# Patient Record
Sex: FEMALE | Race: WHITE | NOT HISPANIC OR LATINO | Employment: OTHER | URBAN - NONMETROPOLITAN AREA
[De-identification: names, ages, dates, MRNs, and addresses within clinical notes are randomized per-mention and may not be internally consistent; named-entity substitution may affect disease eponyms.]

---

## 2019-05-31 ENCOUNTER — OFFICE VISIT (OUTPATIENT)
Dept: AUDIOLOGY | Facility: OTHER | Age: 84
End: 2019-05-31
Attending: AUDIOLOGIST
Payer: MEDICARE

## 2019-05-31 DIAGNOSIS — H90.3 SENSORINEURAL HEARING LOSS (SNHL) OF BOTH EARS: Primary | ICD-10-CM

## 2019-05-31 PROCEDURE — V5299 HEARING SERVICE: HCPCS | Performed by: AUDIOLOGIST

## 2019-05-31 NOTE — PROGRESS NOTES
Background:  Received repaired Right [employer] Quantum 2 10 TIC .  The hearing aid was repaired under warranty as seam came apart.      Procedures Performed:  The  completed the following repairs: remake and replaced electronics. Reprogrammed to user settings.    Follow up:   Ms. Greene was called for hearing aid  at .  Return to clinic as needed.  Hearing evaluation recommended as last results 8/21/15 and hearing aid reprogramming.    Erika Sexton  Audiology Assistant  [facility]  [phone]    Dena Newman M.S., [facility]  Audiologist #[id number]

## 2019-10-30 ENCOUNTER — APPOINTMENT (OUTPATIENT)
Dept: CT IMAGING | Facility: HOSPITAL | Age: 84
DRG: 872 | End: 2019-10-30
Attending: FAMILY MEDICINE
Payer: MEDICARE

## 2019-10-30 ENCOUNTER — HOSPITAL ENCOUNTER (INPATIENT)
Facility: HOSPITAL | Age: 84
LOS: 2 days | Discharge: HOME HEALTH | DRG: 872 | End: 2019-11-01
Attending: FAMILY MEDICINE | Admitting: INTERNAL MEDICINE
Payer: MEDICARE

## 2019-10-30 ENCOUNTER — HOSPITAL ENCOUNTER (INPATIENT)
Dept: CARDIOLOGY | Facility: HOSPITAL | Age: 84
DRG: 872 | End: 2019-10-30
Attending: INTERNAL MEDICINE
Payer: MEDICARE

## 2019-10-30 ENCOUNTER — APPOINTMENT (OUTPATIENT)
Dept: GENERAL RADIOLOGY | Facility: HOSPITAL | Age: 84
DRG: 872 | End: 2019-10-30
Attending: INTERNAL MEDICINE
Payer: MEDICARE

## 2019-10-30 ENCOUNTER — APPOINTMENT (OUTPATIENT)
Dept: GENERAL RADIOLOGY | Facility: HOSPITAL | Age: 84
DRG: 872 | End: 2019-10-30
Attending: FAMILY MEDICINE
Payer: MEDICARE

## 2019-10-30 DIAGNOSIS — I48.91 NEW ONSET A-FIB (H): ICD-10-CM

## 2019-10-30 DIAGNOSIS — N30.00 ACUTE CYSTITIS WITHOUT HEMATURIA: Primary | ICD-10-CM

## 2019-10-30 DIAGNOSIS — N39.0 ACUTE UTI: ICD-10-CM

## 2019-10-30 LAB
ALBUMIN SERPL-MCNC: 3.7 G/DL (ref 3.4–5)
ALBUMIN UR-MCNC: 30 MG/DL
ALP SERPL-CCNC: 94 U/L (ref 40–150)
ALT SERPL W P-5'-P-CCNC: 35 U/L (ref 0–50)
ANION GAP SERPL CALCULATED.3IONS-SCNC: 7 MMOL/L (ref 3–14)
APPEARANCE UR: ABNORMAL
AST SERPL W P-5'-P-CCNC: 60 U/L (ref 0–45)
BACTERIA #/AREA URNS HPF: ABNORMAL /HPF
BASOPHILS # BLD AUTO: 0 10E9/L (ref 0–0.2)
BASOPHILS NFR BLD AUTO: 0.5 %
BILIRUB SERPL-MCNC: 0.6 MG/DL (ref 0.2–1.3)
BILIRUB UR QL STRIP: NEGATIVE
BUN SERPL-MCNC: 19 MG/DL (ref 7–30)
CALCIUM SERPL-MCNC: 9.5 MG/DL (ref 8.5–10.1)
CHLORIDE SERPL-SCNC: 102 MMOL/L (ref 94–109)
CK SERPL-CCNC: 874 U/L (ref 30–225)
CO2 SERPL-SCNC: 24 MMOL/L (ref 20–32)
COLOR UR AUTO: YELLOW
CREAT SERPL-MCNC: 1.14 MG/DL (ref 0.52–1.04)
DIFFERENTIAL METHOD BLD: ABNORMAL
EOSINOPHIL # BLD AUTO: 0 10E9/L (ref 0–0.7)
EOSINOPHIL NFR BLD AUTO: 0 %
ERYTHROCYTE [DISTWIDTH] IN BLOOD BY AUTOMATED COUNT: 13.6 % (ref 10–15)
GFR SERPL CREATININE-BSD FRML MDRD: 43 ML/MIN/{1.73_M2}
GLUCOSE SERPL-MCNC: 94 MG/DL (ref 70–99)
GLUCOSE UR STRIP-MCNC: NEGATIVE MG/DL
HCT VFR BLD AUTO: 44.3 % (ref 35–47)
HGB BLD-MCNC: 14.7 G/DL (ref 11.7–15.7)
HGB UR QL STRIP: ABNORMAL
IMM GRANULOCYTES # BLD: 0 10E9/L (ref 0–0.4)
IMM GRANULOCYTES NFR BLD: 0.2 %
KETONES UR STRIP-MCNC: NEGATIVE MG/DL
LACTATE BLD-SCNC: 1.7 MMOL/L (ref 0.7–2)
LEUKOCYTE ESTERASE UR QL STRIP: ABNORMAL
LYMPHOCYTES # BLD AUTO: 0.3 10E9/L (ref 0.8–5.3)
LYMPHOCYTES NFR BLD AUTO: 6.9 %
MCH RBC QN AUTO: 28.9 PG (ref 26.5–33)
MCHC RBC AUTO-ENTMCNC: 33.2 G/DL (ref 31.5–36.5)
MCV RBC AUTO: 87 FL (ref 78–100)
MONOCYTES # BLD AUTO: 0.2 10E9/L (ref 0–1.3)
MONOCYTES NFR BLD AUTO: 3.7 %
NEUTROPHILS # BLD AUTO: 3.6 10E9/L (ref 1.6–8.3)
NEUTROPHILS NFR BLD AUTO: 88.7 %
NITRATE UR QL: NEGATIVE
NRBC # BLD AUTO: 0 10*3/UL
NRBC BLD AUTO-RTO: 0 /100
PH UR STRIP: 6.5 PH (ref 4.7–8)
PLATELET # BLD AUTO: 158 10E9/L (ref 150–450)
POTASSIUM SERPL-SCNC: 3.8 MMOL/L (ref 3.4–5.3)
PROT SERPL-MCNC: 7.6 G/DL (ref 6.8–8.8)
RBC # BLD AUTO: 5.08 10E12/L (ref 3.8–5.2)
RBC #/AREA URNS AUTO: 8 /HPF (ref 0–2)
SODIUM SERPL-SCNC: 133 MMOL/L (ref 133–144)
SOURCE: ABNORMAL
SP GR UR STRIP: 1.02 (ref 1–1.03)
TROPONIN I SERPL-MCNC: 0.05 UG/L (ref 0–0.04)
TROPONIN I SERPL-MCNC: 0.06 UG/L (ref 0–0.04)
UROBILINOGEN UR STRIP-MCNC: NORMAL MG/DL (ref 0–2)
WBC # BLD AUTO: 4.1 10E9/L (ref 4–11)
WBC #/AREA URNS AUTO: 152 /HPF (ref 0–5)

## 2019-10-30 PROCEDURE — 36415 COLL VENOUS BLD VENIPUNCTURE: CPT | Performed by: FAMILY MEDICINE

## 2019-10-30 PROCEDURE — 93010 ELECTROCARDIOGRAM REPORT: CPT | Performed by: INTERNAL MEDICINE

## 2019-10-30 PROCEDURE — 25000132 ZZH RX MED GY IP 250 OP 250 PS 637: Mod: GY | Performed by: INTERNAL MEDICINE

## 2019-10-30 PROCEDURE — 25000128 H RX IP 250 OP 636: Performed by: FAMILY MEDICINE

## 2019-10-30 PROCEDURE — 99285 EMERGENCY DEPT VISIT HI MDM: CPT | Mod: 25

## 2019-10-30 PROCEDURE — 12000000 ZZH R&B MED SURG/OB

## 2019-10-30 PROCEDURE — 99222 1ST HOSP IP/OBS MODERATE 55: CPT | Mod: AI | Performed by: INTERNAL MEDICINE

## 2019-10-30 PROCEDURE — 93306 TTE W/DOPPLER COMPLETE: CPT | Mod: TC

## 2019-10-30 PROCEDURE — 87186 SC STD MICRODIL/AGAR DIL: CPT | Performed by: FAMILY MEDICINE

## 2019-10-30 PROCEDURE — 87088 URINE BACTERIA CULTURE: CPT | Performed by: FAMILY MEDICINE

## 2019-10-30 PROCEDURE — 87040 BLOOD CULTURE FOR BACTERIA: CPT | Performed by: FAMILY MEDICINE

## 2019-10-30 PROCEDURE — 36415 COLL VENOUS BLD VENIPUNCTURE: CPT | Performed by: INTERNAL MEDICINE

## 2019-10-30 PROCEDURE — 99284 EMERGENCY DEPT VISIT MOD MDM: CPT | Mod: Z6 | Performed by: FAMILY MEDICINE

## 2019-10-30 PROCEDURE — 96365 THER/PROPH/DIAG IV INF INIT: CPT

## 2019-10-30 PROCEDURE — 25800030 ZZH RX IP 258 OP 636: Performed by: INTERNAL MEDICINE

## 2019-10-30 PROCEDURE — 73502 X-RAY EXAM HIP UNI 2-3 VIEWS: CPT | Mod: TC

## 2019-10-30 PROCEDURE — 87086 URINE CULTURE/COLONY COUNT: CPT | Performed by: FAMILY MEDICINE

## 2019-10-30 PROCEDURE — 84484 ASSAY OF TROPONIN QUANT: CPT | Performed by: INTERNAL MEDICINE

## 2019-10-30 PROCEDURE — 81001 URINALYSIS AUTO W/SCOPE: CPT | Performed by: FAMILY MEDICINE

## 2019-10-30 PROCEDURE — 25000132 ZZH RX MED GY IP 250 OP 250 PS 637: Mod: GY | Performed by: FAMILY MEDICINE

## 2019-10-30 PROCEDURE — 85025 COMPLETE CBC W/AUTO DIFF WBC: CPT | Performed by: FAMILY MEDICINE

## 2019-10-30 PROCEDURE — 71045 X-RAY EXAM CHEST 1 VIEW: CPT | Mod: TC

## 2019-10-30 PROCEDURE — 25000128 H RX IP 250 OP 636: Performed by: INTERNAL MEDICINE

## 2019-10-30 PROCEDURE — 93005 ELECTROCARDIOGRAM TRACING: CPT

## 2019-10-30 PROCEDURE — 82550 ASSAY OF CK (CPK): CPT | Performed by: FAMILY MEDICINE

## 2019-10-30 PROCEDURE — 40000786 ZZHCL STATISTIC ACTIVE MRSA SURVEILLANCE CULTURE: Performed by: INTERNAL MEDICINE

## 2019-10-30 PROCEDURE — 83605 ASSAY OF LACTIC ACID: CPT | Performed by: FAMILY MEDICINE

## 2019-10-30 PROCEDURE — 84484 ASSAY OF TROPONIN QUANT: CPT | Performed by: FAMILY MEDICINE

## 2019-10-30 PROCEDURE — 70450 CT HEAD/BRAIN W/O DYE: CPT | Mod: TC

## 2019-10-30 PROCEDURE — 80053 COMPREHEN METABOLIC PANEL: CPT | Performed by: FAMILY MEDICINE

## 2019-10-30 PROCEDURE — 93306 TTE W/DOPPLER COMPLETE: CPT | Mod: 26 | Performed by: INTERNAL MEDICINE

## 2019-10-30 RX ORDER — LIDOCAINE 40 MG/G
CREAM TOPICAL
Status: DISCONTINUED | OUTPATIENT
Start: 2019-10-30 | End: 2019-11-01 | Stop reason: HOSPADM

## 2019-10-30 RX ORDER — POTASSIUM CHLORIDE 7.45 MG/ML
10 INJECTION INTRAVENOUS
Status: DISCONTINUED | OUTPATIENT
Start: 2019-10-30 | End: 2019-11-01 | Stop reason: HOSPADM

## 2019-10-30 RX ORDER — SODIUM CHLORIDE 9 MG/ML
INJECTION, SOLUTION INTRAVENOUS CONTINUOUS
Status: DISCONTINUED | OUTPATIENT
Start: 2019-10-30 | End: 2019-10-31

## 2019-10-30 RX ORDER — POTASSIUM CHLORIDE 1500 MG/1
20-40 TABLET, EXTENDED RELEASE ORAL
Status: DISCONTINUED | OUTPATIENT
Start: 2019-10-30 | End: 2019-11-01 | Stop reason: HOSPADM

## 2019-10-30 RX ORDER — ONDANSETRON 4 MG/1
4 TABLET, ORALLY DISINTEGRATING ORAL EVERY 6 HOURS PRN
Status: DISCONTINUED | OUTPATIENT
Start: 2019-10-30 | End: 2019-11-01 | Stop reason: HOSPADM

## 2019-10-30 RX ORDER — ONDANSETRON 2 MG/ML
4 INJECTION INTRAMUSCULAR; INTRAVENOUS EVERY 6 HOURS PRN
Status: DISCONTINUED | OUTPATIENT
Start: 2019-10-30 | End: 2019-11-01 | Stop reason: HOSPADM

## 2019-10-30 RX ORDER — ACETAMINOPHEN 325 MG/1
650 TABLET ORAL EVERY 4 HOURS PRN
Status: DISCONTINUED | OUTPATIENT
Start: 2019-10-30 | End: 2019-11-01 | Stop reason: HOSPADM

## 2019-10-30 RX ORDER — ACETAMINOPHEN 325 MG/1
650 TABLET ORAL ONCE
Status: COMPLETED | OUTPATIENT
Start: 2019-10-30 | End: 2019-10-30

## 2019-10-30 RX ORDER — CEFTRIAXONE SODIUM 1 G/50ML
1 INJECTION, SOLUTION INTRAVENOUS EVERY 24 HOURS
Status: DISCONTINUED | OUTPATIENT
Start: 2019-10-30 | End: 2019-11-01

## 2019-10-30 RX ORDER — POTASSIUM CL/LIDO/0.9 % NACL 10MEQ/0.1L
10 INTRAVENOUS SOLUTION, PIGGYBACK (ML) INTRAVENOUS
Status: DISCONTINUED | OUTPATIENT
Start: 2019-10-30 | End: 2019-11-01 | Stop reason: HOSPADM

## 2019-10-30 RX ORDER — POTASSIUM CHLORIDE 1.5 G/1.58G
20-40 POWDER, FOR SOLUTION ORAL
Status: DISCONTINUED | OUTPATIENT
Start: 2019-10-30 | End: 2019-11-01 | Stop reason: HOSPADM

## 2019-10-30 RX ORDER — NALOXONE HYDROCHLORIDE 0.4 MG/ML
.1-.4 INJECTION, SOLUTION INTRAMUSCULAR; INTRAVENOUS; SUBCUTANEOUS
Status: DISCONTINUED | OUTPATIENT
Start: 2019-10-30 | End: 2019-11-01 | Stop reason: HOSPADM

## 2019-10-30 RX ORDER — CIPROFLOXACIN 2 MG/ML
400 INJECTION, SOLUTION INTRAVENOUS ONCE
Status: COMPLETED | OUTPATIENT
Start: 2019-10-30 | End: 2019-10-30

## 2019-10-30 RX ORDER — MORPHINE SULFATE 2 MG/ML
1 INJECTION, SOLUTION INTRAMUSCULAR; INTRAVENOUS
Status: DISCONTINUED | OUTPATIENT
Start: 2019-10-30 | End: 2019-11-01 | Stop reason: HOSPADM

## 2019-10-30 RX ORDER — HEPARIN SODIUM 5000 [USP'U]/.5ML
5000 INJECTION, SOLUTION INTRAVENOUS; SUBCUTANEOUS EVERY 12 HOURS
Status: DISCONTINUED | OUTPATIENT
Start: 2019-10-30 | End: 2019-11-01 | Stop reason: HOSPADM

## 2019-10-30 RX ADMIN — ACETAMINOPHEN 650 MG: 325 TABLET, FILM COATED ORAL at 10:53

## 2019-10-30 RX ADMIN — HEPARIN SODIUM 5000 UNITS: 5000 INJECTION, SOLUTION INTRAVENOUS; SUBCUTANEOUS at 14:15

## 2019-10-30 RX ADMIN — ACETAMINOPHEN 650 MG: 325 TABLET, FILM COATED ORAL at 16:35

## 2019-10-30 RX ADMIN — SODIUM CHLORIDE: 9 INJECTION, SOLUTION INTRAVENOUS at 21:21

## 2019-10-30 RX ADMIN — SODIUM CHLORIDE: 9 INJECTION, SOLUTION INTRAVENOUS at 16:24

## 2019-10-30 RX ADMIN — SODIUM CHLORIDE 500 ML: 9 INJECTION, SOLUTION INTRAVENOUS at 14:14

## 2019-10-30 RX ADMIN — SODIUM CHLORIDE 500 ML: 9 INJECTION, SOLUTION INTRAVENOUS at 10:49

## 2019-10-30 RX ADMIN — CIPROFLOXACIN 400 MG: 2 INJECTION, SOLUTION INTRAVENOUS at 10:51

## 2019-10-30 RX ADMIN — CEFTRIAXONE SODIUM 1 G: 1 INJECTION, SOLUTION INTRAVENOUS at 16:36

## 2019-10-30 ASSESSMENT — ENCOUNTER SYMPTOMS
ACTIVITY CHANGE: 1
VOMITING: 0
ABDOMINAL PAIN: 0
DIARRHEA: 0
ROS SKIN COMMENTS: SEE HPI
SHORTNESS OF BREATH: 0
ARTHRALGIAS: 0
PSYCHIATRIC NEGATIVE: 1
NAUSEA: 0
COLOR CHANGE: 1
CONSTIPATION: 0
FEVER: 1
PALPITATIONS: 0
WOUND: 0
BACK PAIN: 0

## 2019-10-30 ASSESSMENT — ACTIVITIES OF DAILY LIVING (ADL)
ADLS_ACUITY_SCORE: 23
ADLS_ACUITY_SCORE: 23

## 2019-10-30 ASSESSMENT — MIFFLIN-ST. JEOR: SCORE: 995.5

## 2019-10-30 NOTE — H&P
[facility]    History and Physical  Hospitalist       Date of Admission:  10/30/2019  Date of Service (when I saw the patient): 10/30/19    Assessment & Plan   Bianca Greene is a remarkably healthy 88 year old female who is extremely hard of hearing presents with a fall at home and fevers.  She is found to have acute urinary tract infection.    Acute cystitis without hematuria: She does appear septic at this time.  Her blood pressure is adequate at this time, her heart rate is tachycardic with a little bit of atrial fibrillation, however currently she is normal sinus rhythm.  -IV fluid resuscitation  -IV antibiotics  -We will follow blood and urine cultures    CVS: Patient does not have any cardiovascular history necessitating prescription medications prior to this admission.  Physical exam revealed pretty significant systolic murmur.  The patient also had an episode of atrial fibrillation with rapid ventricular response, but she currently converted back spontaneously into normal sinus rhythm.  Slight troponin leak of 0.049, perhaps demand ischemia.  -IV fluid resuscitation  -Echocardiogram  -We will trend troponin to ensure downward trajectory    CKD: her GFR appears to be around 40-50 at baseline, which is stable.  -monitor renal function and UOP    Elevated CK: possibly from fall and bruising.  -will trend    DVT Prophylaxis: Heparin SQ    Code Status: Full Code    Disposition: Expected discharge in 1-3 days pending improvement.    Chintan Mejia MD    Primary Care Physician   David Hughes    Chief Complaint   Fall, fever    History is obtained from the patient and patient's daughter    History of Present Illness   Bianca Greene is a remarkably healthy 88 year old female who is extremely hard of hearing presents with a fall and fevers.  The patient lives with her son, and her son found her down by the front door this morning.  He was able to help her back into bed and then called 911.   According to her daughter who was at bedside, the patient was complaining of fatigue yesterday, and slept most of the day.  They did not notice any fevers yesterday however, and she was got complaining of anything focal.  When she was found down by the son, they surmised that she must have fallen on her left side because she was complaining of some left-sided pain in her shoulder and her hip, and she also has a bruise over her left temple.  Her blood work was fairly unremarkable, urinalysis suggested that she has a acute urinary tract infection.  In the emergency department the patient also converted into atrial fibrillation with some tachycardia, however she spontaneously converted back.  She has no history of any heart problems, and in fact she takes no prescription medications at all at baseline.  She is however at this time rather somnolent, so she will be admitted for IV antibiotics and closer monitoring.    Past Medical History    I have reviewed this patient's medical history and updated it with pertinent information if needed.   Past Medical History:   Diagnosis Date     Abdominal pain, left lower quadrant 5/14/2002     Chest pain, unspecified 6/26/2007     Other screening mammogram 5/22/2002     Venous stasis ulcer (H)        Past Surgical History   I have reviewed this patient's surgical history and updated it with pertinent information if needed.  Past Surgical History:   Procedure Laterality Date     EYE SURGERY  2010    Bilateral cataracts     HYSTERECTOMY       left hip replacement  2009    RIGHT hip replacement per pt     TONSILLECTOMY         Prior to Admission Medications   None     Allergies   No Known Allergies    Social History   I have reviewed this patient's social history and updated it with pertinent information if needed. Bianca DUSTIN Greene  reports that she has never smoked. She has never used smokeless tobacco. She reports that she does not drink alcohol or use drugs.    Family History   I  have reviewed this patient's family history and updated it with pertinent information if needed.   Family History   Problem Relation Age of Onset     Cancer - colorectal Sister      Cancer Son 54        brain     Cancer Sister 62        leukemia; cause of death     Cerebrovascular Disease Son      Dementia Mother 89        cause of death     Heart Disease Father         heart disease; cause of death     Osteoporosis Mother      Suicide Brother        Review of Systems   The 10 point Review of Systems could not be obtained due to patient's somnolence.    Physical Exam   Temp: (!) 101.5  F (38.6  C) Temp src: Tympanic BP: 145/82   Heart Rate: 91 Resp: 20 SpO2: 97 % O2 Device: None (Room air)    Vital Signs with Ranges  Temp:  [101.5  F (38.6  C)-103.5  F (39.7  C)] 101.5  F (38.6  C)  Heart Rate:  [82-91] 91  Resp:  [20] 20  BP: (145-184)/(66-82) 145/82  SpO2:  [93 %-97 %] 97 %  0 lbs 0 oz    Constitutional: somnolent, very hard of hearing  Eyes: PERRLA, no injection, no icterus  HEENT: atraumatic, normocephalic  Respiratory: CTA b/l  Cardiovascular: S1 S2 somewhat tachycardic, loud 5/6 systolic ejection murmur  GI: soft, NT, ND, + bowel sounds  Lymph/Hematologic: no palpable lymphadenopathy  Skin: no rashes, no lesions  Musculoskeletal: No edema, good tone, no deformities  Neurologic: moving all ext, no focal deficits  Psychiatric: somnolent, unable to assess    Data   Data reviewed today:  I personally reviewed imaging reports.  Recent Labs   Lab 10/30/19  1001   WBC 4.1   HGB 14.7   MCV 87         POTASSIUM 3.8   CHLORIDE 102   CO2 24   BUN 19   CR 1.14*   ANIONGAP 7   LUKE 9.5   GLC 94   ALBUMIN 3.7   PROTTOTAL 7.6   BILITOTAL 0.6   ALKPHOS 94   ALT 35   AST 60*   TROPI 0.049*     Lactic Acid   Date Value Ref Range Status   10/30/2019 1.7 0.7 - 2.0 mmol/L Final       Recent Results (from the past 24 hour(s))   XR Chest Port 1 View    Narrative    PROCEDURE:  XR CHEST PORT 1 VW    HISTORY:  dyspnea.      COMPARISON:  None.    FINDINGS:   The cardiac silhouette is normal in size. The pulmonary vasculature is  normal.  There is a retrocardiac density possibly a hiatal hernia.  There are linear opacities in both lungs representing atelectasis or  scarring. No pleural effusion or pneumothorax.      Impression    IMPRESSION:  Linear atelectasis or scarring bilaterally. Possibly a  hiatal hernia seen behind the heart.      ELSA MCQUEEN MD

## 2019-10-30 NOTE — PLAN OF CARE
[facility] Inpatient Admission Note:    Patient admitted to 3214/3214-1 at approximately 1310 via cart accompanied by son and daughter from emergency room . Report received from Kathie in SBAR format at 1247 via telephone. Patient transferred to bed via slide board.. Patient is alert and oriented X 1, denies pain; rates at 0 on 0-10 scale.  Patient oriented to room, unit, hourly rounding, and plan of care. Explained admission packet and patient handbook with patient bill of rights brochure. Will continue to monitor and document as needed.     Inpatient Nursing criteria listed below was met:    Health care directives status obtained and documented: Yes    Care Everywhere authorization obtained No    MRSA swab completed for patient 65 years and older: Yes    Patient identifies a surrogate decision maker: Yes If yes, who:Selene- daughter Contact Information:[phone]     If initial lactic acid >2.0, repeat lactic acid drawn within one hour of arrival to unit: NA. If no, state reason: N/A    Vaccination assessment and education completed: Yes   Vaccinations received prior to admission: Pneumovax no  Influenza(seasonal)  NO   Vaccination(s) ordered: influenza vaccine    Clergy visit ordered if patient requests: N/A    Skin issues/needs documented: Yes    Isolation Patient: no Education given, correct sign in place and documentation row added to PCS:  N/A    Fall Prevention Yes: Care plan updated, education given and documented, sticker and magnet in place: Yes    Care Plan initiated: Yes    Education Documented (including assessment): Yes    Patient has discharge needs : No If yes, please explain:Lives with son

## 2019-10-30 NOTE — PLAN OF CARE
Reason for hospital stay:  UTI    Most recent vitals: /68   Pulse 110   Temp 98.9  F (37.2  C) (Tympanic)   Resp 20   Ht 1.524 m (5')   Wt 64.4 kg (141 lb 15.6 oz)   SpO2 96%   BMI 27.73 kg/m    Pain Management:  Denies  Orientation:  Alert to self. Very [place]  Cardiac:  HR irr and tachy 100-110. Murmur heard. Now on telemetry  Respiratory:  LS crackles. On weaned from 2 to 1 LPM. No work of breathing noted  GI:  BS active. No emesis or nausea reported  :  No void since admission at 1310. Incontinent in ER  Diet: Clear Liquid  Skin Issues:  Bruise to the left hip and left eye, slight bruising to mid back  IVF:  NS bolus infusing now, then to start NS at 100 mL/hr  ABX:  Cipro in ER. Rocephin to start following IV Bolus  Mobility:  A2 to to position from ER cart, at home the Pt normally ambulated with a walker or cane  Safety:  Call light within reach. Bed alarm active. Daughter bedside    Comments:    10/30/2019  3:01 PM  Myra Plascencia RN

## 2019-10-30 NOTE — ED NOTES
Family at bedside and updated on plan  Family had stated that pt was found on the floor this morning by her bed,  Incont.of urine unsure of how long she was laying there.  Pt lives with her son. Pt is normally active and responsive so this with patient being lethargic and responsive with stimulation.  Pt [place]  Pt pleasant.  Pt skin hot to the touch.  Quik cath done on arrival. Cloudy.

## 2019-10-30 NOTE — ED TRIAGE NOTES
Patient was found at home next to her bed.  Family noted she is weak, hot to touch and confused.  Patient is not normally confused.

## 2019-10-30 NOTE — ED PROVIDER NOTES
History     Chief Complaint   Patient presents with     Fever     Altered Mental Status     HPI  Bianca Greene is a 88 year old female who presents the emergency room after having been found on the floor by her son.  She lives with the son, he went in her room this morning and she was on the floor.  Last time he saw her prior to that was 10:00 last night.  She is profoundly hard of hearing, very difficult to communicate with but denies any pain.  She has some bruising and some erythema that are likely secondary to being on the floor, nothing severe and no pain in the joints.  Her 3 children are here and are very attentive.    Allergies:  No Known Allergies    Problem List:    Patient Active Problem List    Diagnosis Date Noted     SNHL (sensorineural hearing loss) 06/30/2014     Priority: Medium     Tinnitus 06/30/2014     Priority: Medium     Impacted cerumen 06/30/2014     Priority: Medium     Venous stasis ulcer (H)      Priority: Medium        Past Medical History:    Past Medical History:   Diagnosis Date     Abdominal pain, left lower quadrant 5/14/2002     Chest pain, unspecified 6/26/2007     Other screening mammogram 5/22/2002     Venous stasis ulcer (H)        Past Surgical History:    Past Surgical History:   Procedure Laterality Date     EYE SURGERY  2010    Bilateral cataracts     HYSTERECTOMY       left hip replacement  2009    RIGHT hip replacement per pt     TONSILLECTOMY         Family History:    Family History   Problem Relation Age of Onset     Cancer - colorectal Sister      Cancer Son 54        brain     Cancer Sister 62        leukemia; cause of death     Cerebrovascular Disease Son      Dementia Mother 89        cause of death     Heart Disease Father         heart disease; cause of death     Osteoporosis Mother      Suicide Brother        Social History:  Marital Status:   [5]  Social History     Tobacco Use     Smoking status: Never Smoker     Smokeless tobacco: Never Used    Substance Use Topics     Alcohol use: No     Drug use: No        Medications:    No current outpatient medications on file.        Review of Systems   Constitutional: Positive for activity change and fever.   HENT: Positive for hearing loss.         Severe bilateral sensorineural hearing loss   Respiratory: Negative for shortness of breath.    Cardiovascular: Negative for chest pain and palpitations.   Gastrointestinal: Negative for abdominal pain, constipation, diarrhea, nausea and vomiting.        Was incontinent of bowel and bladder, she is not normally so.   Musculoskeletal: Negative for arthralgias and back pain.   Skin: Positive for color change. Negative for wound.        See HPI   Psychiatric/Behavioral: Negative.        Physical Exam   BP: (!) 184/72  Heart Rate: 82  Temp: (!) 103.5  F (39.7  C)  Resp: 20  SpO2: 95 %      Physical Exam  Vitals signs and nursing note reviewed.   Constitutional:       General: She is not in acute distress.     Appearance: She is well-developed. She is ill-appearing.   HENT:      Head: Normocephalic.      Comments: No evidence of head injury, no hematomas or lacerations.  Cardiovascular:      Rate and Rhythm: Normal rate and regular rhythm.      Heart sounds: Murmur present. Systolic murmur present with a grade of 2/6.   Pulmonary:      Effort: Pulmonary effort is normal.      Breath sounds: Examination of the left-lower field reveals rales. Rales present.      Comments: Crackles likely atelectasis.  Abdominal:      General: Bowel sounds are normal. There is no distension.      Palpations: Abdomen is soft.      Tenderness: There is no tenderness.   Musculoskeletal: Normal range of motion.         General: No swelling or tenderness.   Skin:     General: Skin is dry.      Capillary Refill: Capillary refill takes less than 2 seconds.      Comments: Skin hot to touch   Neurological:      Mental Status: She is alert.      Cranial Nerves: No cranial nerve deficit or facial  asymmetry.      Motor: Weakness present.      Comments: Difficult to ascertain orientation secondary to markedly decreased hearing.   Psychiatric:         Mood and Affect: Mood normal.         Behavior: Behavior normal.       ED Course        Procedures         EKG Interpretation:      Interpreted by Irais Perdomo MD  Time reviewed: 1000  Symptoms at time of EKG: s/p fall to floor   Rhythm: normal sinus   Rate: Normal  Axis: Normal  Ectopy: none  Conduction: normal  ST Segments/ T Waves: Non-specific ST-T wave changes  Q Waves: none  Comparison to prior: No old EKG available    Clinical Impression: non-specific EKG    Second EKG at 1137 showed atrial fibrillation with rapid ventricular response.  There was a strain pattern with ST elevation in aVR, depression in 3 and aVF as well as V2 V3 V4 and V5.    Clinical impression: New onset atrial fibrillation with strain pattern.    Results for orders placed or performed during the hospital encounter of 10/30/19 (from the past 24 hour(s))   UA reflex to Microscopic and Culture   Result Value Ref Range    Color Urine Yellow     Appearance Urine Slightly Cloudy     Glucose Urine Negative NEG^Negative mg/dL    Bilirubin Urine Negative NEG^Negative    Ketones Urine Negative NEG^Negative mg/dL    Specific Gravity Urine 1.017 1.003 - 1.035    Blood Urine Moderate (A) NEG^Negative    pH Urine 6.5 4.7 - 8.0 pH    Protein Albumin Urine 30 (A) NEG^Negative mg/dL    Urobilinogen mg/dL Normal 0.0 - 2.0 mg/dL    Nitrite Urine Negative NEG^Negative    Leukocyte Esterase Urine Large (A) NEG^Negative    Source Catheterized Urine     RBC Urine 8 (H) 0 - 2 /HPF    WBC Urine 152 (H) 0 - 5 /HPF    Bacteria Urine Moderate (A) NEG^Negative /HPF   CBC with platelets differential   Result Value Ref Range    WBC 4.1 4.0 - 11.0 10e9/L    RBC Count 5.08 3.8 - 5.2 10e12/L    Hemoglobin 14.7 11.7 - 15.7 g/dL    Hematocrit 44.3 35.0 - 47.0 %    MCV 87 78 - 100 fl    MCH 28.9 26.5 - 33.0 pg     MCHC 33.2 31.5 - 36.5 g/dL    RDW 13.6 10.0 - 15.0 %    Platelet Count 158 150 - 450 10e9/L    Diff Method Automated Method     % Neutrophils 88.7 %    % Lymphocytes 6.9 %    % Monocytes 3.7 %    % Eosinophils 0.0 %    % Basophils 0.5 %    % Immature Granulocytes 0.2 %    Nucleated RBCs 0 0 /100    Absolute Neutrophil 3.6 1.6 - 8.3 10e9/L    Absolute Lymphocytes 0.3 (L) 0.8 - 5.3 10e9/L    Absolute Monocytes 0.2 0.0 - 1.3 10e9/L    Absolute Eosinophils 0.0 0.0 - 0.7 10e9/L    Absolute Basophils 0.0 0.0 - 0.2 10e9/L    Abs Immature Granulocytes 0.0 0 - 0.4 10e9/L    Absolute Nucleated RBC 0.0    Comprehensive metabolic panel   Result Value Ref Range    Sodium 133 133 - 144 mmol/L    Potassium 3.8 3.4 - 5.3 mmol/L    Chloride 102 94 - 109 mmol/L    Carbon Dioxide 24 20 - 32 mmol/L    Anion Gap 7 3 - 14 mmol/L    Glucose 94 70 - 99 mg/dL    Urea Nitrogen 19 7 - 30 mg/dL    Creatinine 1.14 (H) 0.52 - 1.04 mg/dL    GFR Estimate 43 (L) >60 mL/min/[1.73_m2]    GFR Estimate If Black 49 (L) >60 mL/min/[1.73_m2]    Calcium 9.5 8.5 - 10.1 mg/dL    Bilirubin Total 0.6 0.2 - 1.3 mg/dL    Albumin 3.7 3.4 - 5.0 g/dL    Protein Total 7.6 6.8 - 8.8 g/dL    Alkaline Phosphatase 94 40 - 150 U/L    ALT 35 0 - 50 U/L    AST 60 (H) 0 - 45 U/L   Lactic acid whole blood   Result Value Ref Range    Lactic Acid 1.7 0.7 - 2.0 mmol/L   CK total   Result Value Ref Range    CK Total 874 (H) 30 - 225 U/L   XR Chest Port 1 View    Narrative    PROCEDURE:  XR CHEST PORT 1 VW    HISTORY:  dyspnea.     COMPARISON:  None.    FINDINGS:   The cardiac silhouette is normal in size. The pulmonary vasculature is  normal.  There is a retrocardiac density possibly a hiatal hernia.  There are linear opacities in both lungs representing atelectasis or  scarring. No pleural effusion or pneumothorax.      Impression    IMPRESSION:  Linear atelectasis or scarring bilaterally. Possibly a  hiatal hernia seen behind the heart.      ELSA MCQUEEN MD        Medications   ciprofloxacin (CIPRO) infusion 400 mg (0 mg Intravenous Stopped 10/30/19 1157)   0.9% sodium chloride BOLUS (0 mLs Intravenous Stopped 10/30/19 1157)   acetaminophen (TYLENOL) tablet 650 mg (650 mg Oral Given 10/30/19 1053)       Assessments & Plan (with Medical Decision Making)   Patient has urinary tract infection and high fever.  She does not appear to be septic, her pulse is normal and blood pressure is elevated.  Lactic acid is normal.  Chest x-ray appears normal.  Oxygen saturation dropped into the 80s when she was asleep, oxygen applied.  Shortly after that the patient went into atrial fibrillation at a rate of 120-150.  There is no record of her having been in atrial fibrillation in the past, her last recorded primary care office visit is in 2015.  Since she has new onset atrial fibrillation and a urinary tract infection with somnolence and high fever she will be admitted to medicine by Dr. Fred Mejia.    I have reviewed the nursing notes.    I have reviewed the findings, diagnosis, plan and need for follow up with the patient.  New Prescriptions    No medications on file       Final diagnoses:   Acute UTI   New onset a-fib (H)       10/30/2019   [facility]     Irais Rosenbaum MD  10/30/19 1201       Irais Rosenbaum MD  10/31/19 1959

## 2019-10-30 NOTE — PLAN OF CARE
Face to face report given with opportunity to observe patient.    Report given to GIANNA Tolentino RN   10/30/2019  3:27 PM

## 2019-10-31 PROBLEM — A41.9 SEPSIS (H): Status: ACTIVE | Noted: 2019-10-31

## 2019-10-31 LAB
ANION GAP SERPL CALCULATED.3IONS-SCNC: 7 MMOL/L (ref 3–14)
BACTERIA SPEC CULT: NORMAL
BUN SERPL-MCNC: 18 MG/DL (ref 7–30)
CALCIUM SERPL-MCNC: 8.6 MG/DL (ref 8.5–10.1)
CHLORIDE SERPL-SCNC: 110 MMOL/L (ref 94–109)
CK SERPL-CCNC: 1037 U/L (ref 30–225)
CO2 SERPL-SCNC: 21 MMOL/L (ref 20–32)
CREAT SERPL-MCNC: 0.96 MG/DL (ref 0.52–1.04)
ERYTHROCYTE [DISTWIDTH] IN BLOOD BY AUTOMATED COUNT: 13.9 % (ref 10–15)
GFR SERPL CREATININE-BSD FRML MDRD: 52 ML/MIN/{1.73_M2}
GLUCOSE SERPL-MCNC: 92 MG/DL (ref 70–99)
HCT VFR BLD AUTO: 35.1 % (ref 35–47)
HGB BLD-MCNC: 11.8 G/DL (ref 11.7–15.7)
MCH RBC QN AUTO: 29.2 PG (ref 26.5–33)
MCHC RBC AUTO-ENTMCNC: 33.6 G/DL (ref 31.5–36.5)
MCV RBC AUTO: 87 FL (ref 78–100)
PLATELET # BLD AUTO: 102 10E9/L (ref 150–450)
POTASSIUM SERPL-SCNC: 3.4 MMOL/L (ref 3.4–5.3)
RBC # BLD AUTO: 4.04 10E12/L (ref 3.8–5.2)
SODIUM SERPL-SCNC: 138 MMOL/L (ref 133–144)
SPECIMEN SOURCE: NORMAL
TROPONIN I SERPL-MCNC: 0.05 UG/L (ref 0–0.04)
WBC # BLD AUTO: 1.6 10E9/L (ref 4–11)

## 2019-10-31 PROCEDURE — 99232 SBSQ HOSP IP/OBS MODERATE 35: CPT | Performed by: INTERNAL MEDICINE

## 2019-10-31 PROCEDURE — 84484 ASSAY OF TROPONIN QUANT: CPT | Performed by: INTERNAL MEDICINE

## 2019-10-31 PROCEDURE — 25000132 ZZH RX MED GY IP 250 OP 250 PS 637: Mod: GY | Performed by: INTERNAL MEDICINE

## 2019-10-31 PROCEDURE — 80048 BASIC METABOLIC PNL TOTAL CA: CPT | Performed by: INTERNAL MEDICINE

## 2019-10-31 PROCEDURE — 25800030 ZZH RX IP 258 OP 636: Performed by: INTERNAL MEDICINE

## 2019-10-31 PROCEDURE — 90662 IIV NO PRSV INCREASED AG IM: CPT | Performed by: INTERNAL MEDICINE

## 2019-10-31 PROCEDURE — 36415 COLL VENOUS BLD VENIPUNCTURE: CPT | Performed by: INTERNAL MEDICINE

## 2019-10-31 PROCEDURE — 85027 COMPLETE CBC AUTOMATED: CPT | Performed by: INTERNAL MEDICINE

## 2019-10-31 PROCEDURE — 82550 ASSAY OF CK (CPK): CPT | Performed by: INTERNAL MEDICINE

## 2019-10-31 PROCEDURE — 12000000 ZZH R&B MED SURG/OB

## 2019-10-31 PROCEDURE — 25000128 H RX IP 250 OP 636: Performed by: INTERNAL MEDICINE

## 2019-10-31 RX ADMIN — INFLUENZA A VIRUS A/MICHIGAN/45/2015 X-275 (H1N1) ANTIGEN (FORMALDEHYDE INACTIVATED), INFLUENZA A VIRUS A/SINGAPORE/INFIMH-16-0019/2016 IVR-186 (H3N2) ANTIGEN (FORMALDEHYDE INACTIVATED), AND INFLUENZA B VIRUS B/MARYLAND/15/2016 BX-69A (A B/COLORADO/6/2017-LIKE VIRUS) ANTIGEN (FORMALDEHYDE INACTIVATED) 0.5 ML: 60; 60; 60 INJECTION, SUSPENSION INTRAMUSCULAR at 09:55

## 2019-10-31 RX ADMIN — ACETAMINOPHEN 650 MG: 325 TABLET, FILM COATED ORAL at 09:53

## 2019-10-31 RX ADMIN — HEPARIN SODIUM 5000 UNITS: 5000 INJECTION, SOLUTION INTRAVENOUS; SUBCUTANEOUS at 13:24

## 2019-10-31 RX ADMIN — SODIUM CHLORIDE: 9 INJECTION, SOLUTION INTRAVENOUS at 07:52

## 2019-10-31 RX ADMIN — HEPARIN SODIUM 5000 UNITS: 5000 INJECTION, SOLUTION INTRAVENOUS; SUBCUTANEOUS at 02:47

## 2019-10-31 RX ADMIN — CEFTRIAXONE SODIUM 1 G: 1 INJECTION, SOLUTION INTRAVENOUS at 16:07

## 2019-10-31 RX ADMIN — ACETAMINOPHEN 650 MG: 325 TABLET, FILM COATED ORAL at 00:12

## 2019-10-31 ASSESSMENT — ACTIVITIES OF DAILY LIVING (ADL)
ADLS_ACUITY_SCORE: 25
ADLS_ACUITY_SCORE: 23
ADLS_ACUITY_SCORE: 25
ADLS_ACUITY_SCORE: 23
ADLS_ACUITY_SCORE: 23
ADLS_ACUITY_SCORE: 25

## 2019-10-31 NOTE — PLAN OF CARE
Reason for hospital stay:  UTI / Fall    Most recent vitals: /67   Pulse 110   Temp 98.1  F (36.7  C) (Tympanic)   Resp 20   Ht 1.524 m (5')   Wt 64.4 kg (141 lb 15.6 oz)   SpO2 94%   BMI 27.73 kg/m      Pain Management:  Denied any pain this shift. Did give tylenol once for fever / comfort.     Orientation:  Oriented only to self. Impulsive at times, trying to get out of bed and wanted to walk around room, difficult to redirect at times due to hard of hearing. Pleasant and cooperative at other times.     Cardiac:  Tele - converted to NSR at 3:15 PM HR 70s.    Respiratory:  Lungs clear except for crackles to bilateral bases. Weaned off of O2 and sats maintaining 92-96% on RA.     GI:  Good appetite - ate all of supper - encouraging fluids.     :  Voiding dark inocencia strong smelling urine. Both continent and incontinent at times.     Nutrition:  Reg diet    Skin Issues:  Scattered bruising from recent fall at home this AM. Bruising to upper back and left shoulder, right hand, and left hip.    IVF:  NS infusing at 100mL/hr to RUE. SL to LUE.    ABX:  Continues on IV Rocephin    Mobility:  Up with standby assist to commode.     Safety:  Alarms on, room next to nurses station.      Face to face report given with opportunity to observe patient.    Report given to Nancy Ortiz RN   10/30/2019  11:32 PM

## 2019-10-31 NOTE — PLAN OF CARE
Reason for hospital stay:  UTI, fall    Most recent vitals: /58   Pulse 71   Temp 98.2  F (36.8  C) (Tympanic)   Resp 14   Ht 1.524 m (5')   Wt 64.4 kg (141 lb 15.6 oz)   SpO2 96%   BMI 27.73 kg/m    Pain Management:  C/o back pain per family report, administered Tylenol with relief  Orientation:  Alert to self. Very [place]. Family present most of shift  Cardiac:  Telemetry monitoring by ICU, flipping from SR 70's to A-Fib 's during the shift  Respiratory:  LS with crackles heard. Denies sob. No use of O2, maintaining 95%  GI:  BS active. Denies nausea. Large BM this shift  :  Voiding without difficulty, some dribbling  Diet: Reg diet, good appetite  Skin Issues:  Bruising to left hip, mid back, and left outer eye  IVF:  NS at 100 mL/hr  ABX:  Rocephin  Mobility:  A1 to transfer. Little unsteady on feet. Ambulated 550' on the floor this shift.  Safety:  Call light within reach, chair alarm active. Family bedside all day    Comments:    10/31/2019  12:24 PM  Myra Plascencia RN

## 2019-10-31 NOTE — PROGRESS NOTES
Assessment completed see flowsheet.    LOC: alert - very hard of hearing. Assessment information gathered via phone conversation with sonJamel, who patient lives with. Daughter, Selene was with Jamel as well during this conversation and able to provide some information as well.   Others present: Patient- Jamel and Selene via phone conversation    Dx: Acute cycstitis  Chronic Disease Management: States she sees no specialists. States she saw Audiology previously but the dog ate her hearing aides. He states they are saving money for hearing aides as they are very expensive. Discussed that patient may qualify for medical assistance, which possibly could help cover hearing aides (unsure of this and he agrees to look into this). He agrees to a Patient Financial consult to be placed to see if she does qualify for MA and asked that they call his sister, Selene as she is patient's POA and has all of her financial information. Jamel and Selene agree to provide the POA paperwork and think it may appoint Selene as a medical Health Care agent as well, but they need to review the paperwork to be sure.     Lives with: sonJamel   Living at:  House   Transportation: YES - doesn't drive. Jamel provides transportation and will transport on hospital discharge.     Primary PCP: David Hughes  Insurance:  Medicare and [email or web address]  Medicare IM letter reviewed with sonJamel (patient too hard of hearing).    Support System: children   Homecare/PCA: no  /County Services:   no  [place]: NO      How was the [facility] notification completed: N/A    Health Care Directive: NO - per Jamel, they are checking if the POA paperwork includes medical decision making and will be providing a copy of that paperwork today.   Guardian: no  POA: per Jamel- her daughterSelene.    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    ADLs: independent at baseline   Ambulation:independent with cane (at times) at baseline  Falls: denies falls in the past six months (besides the fall leading to this hospitalization, felt to be caused by weakness from UTI)  Nutrition: shared   Sleep: denies sleep concerns     Equipment used: cane, walk-in shower      Oxygen supplier: N/A      Does the supplier have valid oxygen orders: N/A    Mental health: denies mood concerns   Substance abuse: denies tobacco, alcohol or other drug use   Exposure to violence/abuse: denies  Stressors: denies    Able to Return to Prior Living Arrangements: YES    Choice of Vendor: N/A    Barriers: none identified     MIRTHA: Average    Plan: Return home with family, transport by family

## 2019-10-31 NOTE — PLAN OF CARE
Face to face report given with opportunity to observe patient.    Report given to GIANNA Anders RN   10/31/2019  3:36 PM

## 2019-10-31 NOTE — PROGRESS NOTES
[facility]    Hospitalist Progress Note    Date of Service (when I saw the patient): 10/31/2019    Assessment & Plan   Bianca Greene is a remarkably healthy 88 year old female who is extremely hard of hearing presents with a fall at home and fevers.  She is found to have acute urinary tract infection.     Acute cystitis without hematuria: clinically she is much improve, sepsis resolving.  Leukopenia today at 1.6, will monitor.  Her blood pressure is adequate at this time, her heart rate is tachycardic with a little bit of atrial fibrillation, however currently she is normal sinus rhythm.  Urine culture growing >100,000 colonies gram negative elizabeth.  Fever curve is stabilizing, but Tmax is still 100.4.  -IV fluid resuscitation  -IV antibiotics  -We will follow blood and urine cultures     CVS: Patient does not have any cardiovascular history necessitating prescription medications prior to this admission.  Physical exam revealed pretty significant systolic murmur.  The patient also had an episode of atrial fibrillation with rapid ventricular response, but she currently converted back spontaneously into normal sinus rhythm.  Slight troponin leak of 0.049, perhaps demand ischemia, trended down.  -IV fluid resuscitation  -Echocardiogram is pending     CKD: her baseline GFR appears to be around 40-50 at baseline, which is stable.  -monitor renal function and UOP     Elevated CK: possibly from fall and bruising.  Up to 1,037 today from 874.  -will continue trending  -IVFs     DVT Prophylaxis: Heparin SQ     Code Status: Full Code     Disposition: Expected discharge in 1-2 days pending improvement.    Chintan Mejia MD        Interval History   Patient seen in room.  Much improved, awake, alert, conversant but extremely hard of hearing.  No acute events overnight, no new symptoms.    -Data reviewed today: I reviewed all new labs and imaging results over the last 24 hours. I personally reviewed imaging  reports.    Physical Exam   Temp: 97.8  F (36.6  C) Temp src: Temporal BP: 124/68 Pulse: 110 Heart Rate: 94 Resp: 20 SpO2: 95 % O2 Device: None (Room air) Oxygen Delivery: 1 LPM  Vitals:    10/30/19 1310   Weight: 64.4 kg (141 lb 15.6 oz)     Vital Signs with Ranges  Temp:  [97.8  F (36.6  C)-103.5  F (39.7  C)] 97.8  F (36.6  C)  Pulse:  [110] 110  Heart Rate:  [] 94  Resp:  [18-25] 20  BP: (103-184)/(59-86) 124/68  SpO2:  [92 %-97 %] 95 %    Intake/Output Summary (Last 24 hours) at 10/31/2019 0842  Last data filed at 10/31/2019 0752  Gross per 24 hour   Intake 3038 ml   Output 1050 ml   Net 1988 ml       Peripheral IV 10/30/19 Left Hand (Active)   Site Assessment [facility] 10/31/2019  8:00 AM   Line Status Saline locked;Checked every 1-2 hour 10/31/2019  8:00 AM   Phlebitis Scale 0-->no symptoms 10/31/2019  8:00 AM   Infiltration Scale 0 10/31/2019  8:00 AM   Infiltration Site Treatment Method  None 10/30/2019  9:34 AM   Extravasation? No 10/31/2019 12:00 AM   Number of days: 1       Peripheral IV 10/30/19 Right Upper forearm (Active)   Site Assessment [facility] 10/31/2019  8:00 AM   Line Status Infusing;Checked every 1-2 hour 10/31/2019  8:00 AM   Phlebitis Scale 0-->no symptoms 10/31/2019  8:00 AM   Infiltration Scale 0 10/31/2019  8:00 AM   Extravasation? No 10/31/2019 12:00 AM   Number of days: 1       Wound 08/22/13 Right;Medial Malleolus Ulceration (Active)   Number of days: 2261     No line/device    Constitutional - AA, NAD  HEENT - bruise on left temple, no laceration or abrasion  Neck - supple, no masses, no JVD  CVS - S1 S2 RRR, loud 5/6 systolic murmur, rubs, gallops  Respiratory - CTA b/l  GI - soft, NT, ND, + bowel sounds, no organomegaly  Musculoskeletal - no LE edema, no lesions  Neuro - severely hard of hearing, oriented x 3, no gross focal deficits  Psych - appropriate affect    Medications     sodium chloride 100 mL/hr at 10/31/19 0752       cefTRIAXone  1 g Intravenous Q24H     heparin ANTICOAGULANT   5,000 Units Subcutaneous Q12H     influenza Vac Split High-Dose  0.5 mL Intramuscular Prior to discharge     sodium chloride (PF)  3 mL Intracatheter Q8H       Data   Recent Labs   Lab 10/31/19  0527 10/31/19  0001 10/30/19  1803 10/30/19  1001   WBC 1.6*  --   --  4.1   HGB 11.8  --   --  14.7   MCV 87  --   --  87   *  --   --  158     --   --  133   POTASSIUM 3.4  --   --  3.8   CHLORIDE 110*  --   --  102   CO2 21  --   --  24   BUN 18  --   --  19   CR 0.96  --   --  1.14*   ANIONGAP 7  --   --  7   LUKE 8.6  --   --  9.5   GLC 92  --   --  94   ALBUMIN  --   --   --  3.7   PROTTOTAL  --   --   --  7.6   BILITOTAL  --   --   --  0.6   ALKPHOS  --   --   --  94   ALT  --   --   --  35   AST  --   --   --  60*   TROPI  --  0.047* 0.064* 0.049*     Lactic Acid   Date Value Ref Range Status   10/30/2019 1.7 0.7 - 2.0 mmol/L Final       Recent Results (from the past 24 hour(s))   XR Chest Port 1 View    Narrative    PROCEDURE:  XR CHEST PORT 1 VW    HISTORY:  dyspnea.     COMPARISON:  None.    FINDINGS:   The cardiac silhouette is normal in size. The pulmonary vasculature is  normal.  There is a retrocardiac density possibly a hiatal hernia.  There are linear opacities in both lungs representing atelectasis or  scarring. No pleural effusion or pneumothorax.      Impression    IMPRESSION:  Linear atelectasis or scarring bilaterally. Possibly a  hiatal hernia seen behind the heart.      ELSA MCQUEEN MD   CT Head w/o Contrast    Narrative    PROCEDURE: CT HEAD W/O CONTRAST     HISTORY: fall, head trauma.    COMPARISON: None.    TECHNIQUE:  Helical images of the head from the foramen magnum to the  vertex were obtained without contrast.    FINDINGS: There is some enlargement of the ventricular system and  cortical sulci consistent with atrophy. There is extensive white  matter low-density both hemispheres consistent with small vessel  disease. 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 There are no  masses  ventricular shifts or extracerebral collections. The cranial vault is  intact.  The visualized paranasal sinuses are clear.      Impression    IMPRESSION: No acute brain abnormality.      ELSA MCQUEEN MD   XR Hip Port Left G/E 2 Views    Narrative    PROCEDURE: XR HIP PORTABLE LEFT 2-3 VIEWS 10/30/2019 1:30 PM    HISTORY: fall    COMPARISONS: None.    TECHNIQUE: 2 views.    FINDINGS: There is severe generalized osteopenia. No fracture or  dislocation is seen. There is mild narrowing of the hip joint space  superolaterally with small osteophytes.         Impression    IMPRESSION: No definite fracture. Osteopenia.    MD Chintan WALTER MD

## 2019-10-31 NOTE — PHARMACY
[facility]    Pharmacy    Antimicrobial Stewardship Note     Current antimicrobial therapy:  Anti-infectives (From now, onward)    Start     Dose/Rate Route Frequency Ordered Stop    10/30/19 1330  cefTRIAXone in d5w (ROCEPHIN) intermittent infusion 1 g      1 g  over 30 Minutes Intravenous EVERY 24 HOURS 10/30/19 1316          Indication: UTI    Days of Therapy: 2 (received 1 x dose of IV ciprofloxacin 10/30)     Pertinent labs:  Creatinine   Creatinine   Date Value Ref Range Status   10/31/2019 0.96 0.52 - 1.04 mg/dL Final   10/30/2019 1.14 (H) 0.52 - 1.04 mg/dL Final   2015 1.15 (H) 0.52 - 1.04 mg/dL Final     WBC   WBC   Date Value Ref Range Status   10/31/2019 1.6 (L) 4.0 - 11.0 10e9/L Final   10/30/2019 4.1 4.0 - 11.0 10e9/L Final   2015 3.3 (L) 4.0 - 11.0 10e9/L Final     Procalcitonin No results found for: PCAL  CRP   CRP Inflammation   Date Value Ref Range Status   2014 3.2 (H) 0.0 - 3.0 mg/L Final     Comment:      reference ranges have not been established.  C-reactive protein   values   should be interpreted as a comparison of serial measurements.       Culture Results:   7-Day Micro Results   Collected Updated Procedure Result Status    10/30/2019 1330 10/31/2019 0656 Active MRSA Surveillance Culture []   Nares from Nose    Preliminary result Specimen Description Nares   Culture Micro Culture in progress P           10/30/2019 1020 10/31/2019 0725 Blood culture []   Blood    Preliminary result Specimen Description Blood   Special Requests Right Arm P   Culture Micro No growth after 21 hours P           10/30/2019 1001 10/31/2019 0725 Blood culture []   Blood    Preliminary result Specimen Description Blood   Culture Micro No growth after 21 hours P           10/30/2019 0943 10/31/2019 0747 Urine Culture Aerobic Bacterial []   (Abnormal)   Catheterized Urine    Preliminary result Specimen Description Catheterized Urine   Culture Micro >100,000  colonies/mL   Lactose fermenting gram negative rods   Identification and susceptibility to follow.   Abnormal  P             Recommendations/Interventions:  1. No recommendations at this time. Will continue to monitor.     Ana Paula Raymundo RPH  October 31, 2019

## 2019-10-31 NOTE — PLAN OF CARE
Reason for hospital stay: Patient is hospitalized for acute cystitis.     Most recent vitals: /68   Pulse 110   Temp 100  F (37.8  C) (Tympanic)   Resp 18   Ht 1.524 m (5')   Wt 64.4 kg (141 lb 15.6 oz)   SpO2 94%   BMI 27.73 kg/m      Pain Management: Patient denies pain but did have an elevated temp so Tylenol was given for this reason. Recheck of temp was 98.9 F tympanically.     Orientation:  Patient is alert to self and her family. Disoriented to place, time, and situation.     Cardiac:  AP irregular with murmur on auscultation. BP and HR WNL (not pulse noted above.) Tele reading SR 80s per ICU nurse report.     Respiratory: Lung sounds clear in upper loads, crackles in bases. No supplemental O2 needed this shift.     GI:  Patient bowel sounds audible and active.     :  Urine is inocencia and has a foul odor (known cystitis). Patient did get up and void during the night. Pt. Does have some incontinence/dribbling issues.     Nutrition: Regular diet.     Skin Issues: See PCS for skin assessment.     IVF:  NS running at 100 ml/hr.     ABX: Rocephin IV for cystitis.     Mobility: 1-2 assist with ww and gait belt.     Safety: Bed in low position, brakes on, call light in reach, bed alarm on.     Comments: Patient is in good spirits. She slept well.     Face to face report given with opportunity to observe patient.    Report given to GIANNA Renteria RN   10/31/2019  7:26 AM        10/31/2019  6:25 AM  Juli Hernandez RN

## 2019-10-31 NOTE — PROGRESS NOTES
[facility]    Spiritual Health Progress Note    Date of Service (when I saw the patient): 10/31/2019     Assessment & Plan   Bianca Greene is a 88 year old female who was admitted on 10/30/2019.  Introduced the patient as an initial visit to Spiritual Health Services and to see if I could connect the patient to their identified ha community.  Family members were with patient at time of visit.  She was very appreciative of the visit.  Patient very hard of hearing but was able to communicate well enough for her to understand why  was there.  She was asked if she wanted to have prayer and was very glad to have prayer.   spoke close to her ear so she could hear.  She was so happy and appreciative and said she wasn't used to others praying for her.  Recommend following up with her.    Rev. Timur Jackson  Volunteer

## 2019-11-01 ENCOUNTER — APPOINTMENT (OUTPATIENT)
Dept: OCCUPATIONAL THERAPY | Facility: HOSPITAL | Age: 84
DRG: 872 | End: 2019-11-01
Attending: INTERNAL MEDICINE
Payer: MEDICARE

## 2019-11-01 ENCOUNTER — APPOINTMENT (OUTPATIENT)
Dept: PHYSICAL THERAPY | Facility: HOSPITAL | Age: 84
DRG: 872 | End: 2019-11-01
Payer: MEDICARE

## 2019-11-01 VITALS
OXYGEN SATURATION: 98 % | DIASTOLIC BLOOD PRESSURE: 79 MMHG | HEIGHT: 60 IN | SYSTOLIC BLOOD PRESSURE: 172 MMHG | TEMPERATURE: 97.7 F | RESPIRATION RATE: 16 BRPM | HEART RATE: 80 BPM | WEIGHT: 145.06 LBS | BODY MASS INDEX: 28.48 KG/M2

## 2019-11-01 LAB
ANION GAP SERPL CALCULATED.3IONS-SCNC: 6 MMOL/L (ref 3–14)
BACTERIA SPEC CULT: ABNORMAL
BUN SERPL-MCNC: 16 MG/DL (ref 7–30)
CALCIUM SERPL-MCNC: 8.7 MG/DL (ref 8.5–10.1)
CHLORIDE SERPL-SCNC: 108 MMOL/L (ref 94–109)
CK SERPL-CCNC: 828 U/L (ref 30–225)
CO2 SERPL-SCNC: 23 MMOL/L (ref 20–32)
CREAT SERPL-MCNC: 1.01 MG/DL (ref 0.52–1.04)
ERYTHROCYTE [DISTWIDTH] IN BLOOD BY AUTOMATED COUNT: 14 % (ref 10–15)
GFR SERPL CREATININE-BSD FRML MDRD: 49 ML/MIN/{1.73_M2}
GLUCOSE SERPL-MCNC: 134 MG/DL (ref 70–99)
HCT VFR BLD AUTO: 32.8 % (ref 35–47)
HGB BLD-MCNC: 11.2 G/DL (ref 11.7–15.7)
MCH RBC QN AUTO: 29.6 PG (ref 26.5–33)
MCHC RBC AUTO-ENTMCNC: 34.1 G/DL (ref 31.5–36.5)
MCV RBC AUTO: 87 FL (ref 78–100)
PLATELET # BLD AUTO: 98 10E9/L (ref 150–450)
POTASSIUM SERPL-SCNC: 3.2 MMOL/L (ref 3.4–5.3)
RBC # BLD AUTO: 3.78 10E12/L (ref 3.8–5.2)
SODIUM SERPL-SCNC: 137 MMOL/L (ref 133–144)
SPECIMEN SOURCE: ABNORMAL
WBC # BLD AUTO: 2.9 10E9/L (ref 4–11)

## 2019-11-01 PROCEDURE — 36415 COLL VENOUS BLD VENIPUNCTURE: CPT | Performed by: INTERNAL MEDICINE

## 2019-11-01 PROCEDURE — 85027 COMPLETE CBC AUTOMATED: CPT | Performed by: INTERNAL MEDICINE

## 2019-11-01 PROCEDURE — 25000132 ZZH RX MED GY IP 250 OP 250 PS 637: Mod: GY | Performed by: INTERNAL MEDICINE

## 2019-11-01 PROCEDURE — 97165 OT EVAL LOW COMPLEX 30 MIN: CPT | Mod: GO

## 2019-11-01 PROCEDURE — 97161 PT EVAL LOW COMPLEX 20 MIN: CPT | Mod: GP

## 2019-11-01 PROCEDURE — 99239 HOSP IP/OBS DSCHRG MGMT >30: CPT | Performed by: INTERNAL MEDICINE

## 2019-11-01 PROCEDURE — 80048 BASIC METABOLIC PNL TOTAL CA: CPT | Performed by: INTERNAL MEDICINE

## 2019-11-01 PROCEDURE — 82550 ASSAY OF CK (CPK): CPT | Performed by: INTERNAL MEDICINE

## 2019-11-01 PROCEDURE — 25000128 H RX IP 250 OP 636: Performed by: INTERNAL MEDICINE

## 2019-11-01 RX ORDER — CIPROFLOXACIN 500 MG/1
500 TABLET, FILM COATED ORAL EVERY 12 HOURS
Qty: 24 TABLET | Refills: 0 | Status: SHIPPED | OUTPATIENT
Start: 2019-11-01 | End: 2019-11-13

## 2019-11-01 RX ORDER — CIPROFLOXACIN 500 MG/1
500 TABLET, FILM COATED ORAL EVERY 12 HOURS
Status: DISCONTINUED | OUTPATIENT
Start: 2019-11-01 | End: 2019-11-01 | Stop reason: HOSPADM

## 2019-11-01 RX ADMIN — ACETAMINOPHEN 650 MG: 325 TABLET, FILM COATED ORAL at 00:55

## 2019-11-01 RX ADMIN — CIPROFLOXACIN HYDROCHLORIDE 500 MG: 500 TABLET, FILM COATED ORAL at 08:22

## 2019-11-01 RX ADMIN — POTASSIUM CHLORIDE 40 MEQ: 1.5 POWDER, FOR SOLUTION ORAL at 06:25

## 2019-11-01 RX ADMIN — ACETAMINOPHEN 650 MG: 325 TABLET, FILM COATED ORAL at 09:53

## 2019-11-01 RX ADMIN — POTASSIUM CHLORIDE 20 MEQ: 1.5 POWDER, FOR SOLUTION ORAL at 09:53

## 2019-11-01 RX ADMIN — HEPARIN SODIUM 5000 UNITS: 5000 INJECTION, SOLUTION INTRAVENOUS; SUBCUTANEOUS at 00:57

## 2019-11-01 RX ADMIN — ACETAMINOPHEN 650 MG: 325 TABLET, FILM COATED ORAL at 05:33

## 2019-11-01 ASSESSMENT — ACTIVITIES OF DAILY LIVING (ADL)
ADLS_ACUITY_SCORE: 25

## 2019-11-01 ASSESSMENT — MIFFLIN-ST. JEOR: SCORE: 1009.5

## 2019-11-01 NOTE — PLAN OF CARE
Alert with Pt feeling confused. Using pocket talker to communicate with her [place]. VSS, BP elevated with back pain. Administered Tylenol for the back pain and took the Pt for a walk. HR irr and a murmur is heard. LS clear, Denies sob. BS active, Denies nausea. Skin bruised to the left hip, mid back, and left eye. Appetite good.Potassium 3.2 this am replaced per protocol, Pt discharged so unable to recheck via lab draw.    Patient discharged at 11:03 AM via wheel chair accompanied by son and daughter and staff. Prescriptions sent to patients preferred pharmacy. All belongings sent with patient.     Discharge instructions reviewed with Patient and three children. Listed belongings gathered and returned to patient. yes    Patient discharged to Home with family.   Report called to N/A    Core Measures and Vaccines  Core Measures applicable during stay: No. If yes, state diagnosis: UTI and Fall   Pneumonia and Influenza given prior to discharge, if indicated: Yes    Surgical Patient   Surgical Procedures during stay: No  Did patient receive discharge instruction on wound care and recognition of infection symptoms? N/A    MISC  Follow up appointment made:  Yes  Home and hospital aquired medications returned to patient: N/A  Patient reports pain was well managed at discharge: Yes

## 2019-11-01 NOTE — PLAN OF CARE
Discharge Planner OT   Patient plan for discharge: Home with son. Agreeable to home therapy  Current status: SBA-CGA for ADLs/functional mobility   Barriers to return to prior living situation: None  Recommendations for discharge: Home with Assist and Home OT  Rationale for recommendations: Due to pt's current functional status       Entered by: Kristin Watson 11/01/2019 11:48 AM

## 2019-11-01 NOTE — PLAN OF CARE
Discharge Planner PT   Patient plan for discharge: Home with son and home PT   Current status: Transfers: CGA with sit <>Stand  Gait: Pt ambulated about 200' c no assistive device but needed min A for steadying 1x due to loss of balance. Recommend pt use walker at all times when ambulating at home. Ambulated with slow radha and wide LYNNETTE  Barriers to return to prior living situation: None  Recommendations for discharge: Home PT, use walker  Rationale for recommendations: PT evaluation ordered for weakness.Pt is very active and independent at baseline. She currently demonstrates with impaired balance and gait putting her at increased risk for falls. Pt would benefit from home PT to address these impairments. Also recommended to pt and family that pt use walker at all times for safety.          Entered by: Patricia Gonsalves 11/01/2019 11:12 AM

## 2019-11-01 NOTE — PROGRESS NOTES
PT and OT recommended home care for patient. Discussed this recommendation with daughter, Selene via phone conversation and reviewed choice of vendo and she chose [facility]. Referral sent and [facility] can accept patient.     Name: Bianca Greene    MRN#: [id number]    Reason for Hospitalization: New onset a-fib (H) [I48.91]  Acute UTI [N39.0]    Discharge Date: 11/1/2019    Patient / Family response to discharge plan: agreed     Follow-Up Appt:   Future Appointments   Date Time Provider Department Center   11/8/2019  1:15 PM Jennifer De La Torre PA [facility]       Other Providers (Care Coordinator, County Services, PCA services etc): Yes: Home care, discharge orders sent via fax to [facility] for skilled nursing, PT and OT.     Discharge Disposition: home with home care, transport by family.    Laurita Ceballos CM

## 2019-11-01 NOTE — PROGRESS NOTES
Inpatient Physical Therapy Evaluation    Name: Bianca Greene MRN# [id number]   Age: 88 year old YOB: 1930     Date of Consultation: 2019  Consultation is requested by:  Dr. Mejia  Specific Consultation Request:  Weakness  Primary care provider: David Hughes    General Information:   Onset Date: 10/30/19    History of Current Problem/Admission: New onset a-fib (H) [I48.91]  Acute UTI [N39.0]    Prior Level of Function: Pt was independent with all mobility prior to hospitalization. She uses a cane for ambulation outside of her home. She also has a walker   Ambulation: 0 - Independent   Transferrin - Independent   Toiletin - Not assessed   Bathin - Independent   Dressin - Independent   Eatin - Independent   Communication: 0 - Understands/communicates without difficulty  Swallowin - swallows foods/liquids without difficulty  Cognition: 0 - no cognitive issues reported    Fall history within the last 6 months: Yes - 1    Current Living Situation: Patient lives a single story house with her son. No stairs to enter. Pt is very independent and active at baseline. Son reports she walks her dog up to 1/2 mile     Current Equipment Used at Home: Walker, cane    Patient & Family Goals: Return home     Past Medical History:   Past Medical History:   Diagnosis Date     Abdominal pain, left lower quadrant 2002     Chest pain, unspecified 2007     Other screening mammogram 2002     Venous stasis ulcer (H)        Past Surgical History:  Past Surgical History:   Procedure Laterality Date     EYE SURGERY      Bilateral cataracts     HYSTERECTOMY       left hip replacement      RIGHT hip replacement per pt     TONSILLECTOMY         Medications:   Current Facility-Administered Medications   Medication     acetaminophen (TYLENOL) tablet 650 mg     ciprofloxacin (CIPRO) tablet 500 mg     heparin ANTICOAGULANT injection 5,000 Units     lidocaine (LMX4) kit      lidocaine 1 % 0.1-1 mL     melatonin tablet 1 mg     morphine (PF) injection 1 mg     naloxone (NARCAN) injection 0.1-0.4 mg     ondansetron (ZOFRAN-ODT) ODT tab 4 mg    Or     ondansetron (ZOFRAN) injection 4 mg     potassium chloride (KLOR-CON) Packet 20-40 mEq     potassium chloride 10 mEq in 100 mL intermittent infusion with 10 mg lidocaine     potassium chloride 10 mEq in 100 mL sterile water intermittent infusion (premix)     potassium chloride ER (K-DUR/KLOR-CON M) CR tablet 20-40 mEq     sodium chloride (PF) 0.9% PF flush 3 mL     sodium chloride (PF) 0.9% PF flush 3 mL       Weight Bearing Status: NA     Cognitive Status Examination:  Orientation: awake and alert  Level of Consciousness: alert  Follows Commands and Answers Questions: 100% of the time  Personal Safety and Judgement: Intact  Memory: Did not know the year or month. Thought she was in the [facility]   Comments:     Pain:   Currently in pain? Yes  Pain Location? low back  Pain Rating: Sore    Physical Therapy Evaluation:   Integumentary/Edema: NA  ROM: WFL  Strength: WFL  Bed Mobility: NA  Transfers: CGA with sit <>Stand  Gait: Pt ambulated about 200' c no assistive device but needed min A for steadying 1x due to loss of balance. Recommend pt use walker at all times when ambulating at home. Ambulated with slow radha and wide LYNNETTE  Stairs: NA  Balance: Impaired dynamic balance  Sensory: Normal   Coordination: No issues observed     Physical Therapy Interventions: Evaluation only     Clinical Impressions:  Criteria for Skilled Therapeutic Intervention Met: Evaluation Only; pt discharging home today  PT Diagnosis: Impaired balance, impaired gait   Influenced by the following impairments: Impaired balance, impaired gait, weakness  Functional limitations due to impairment: Increased risk for falls   Clinical presentation: Stable/Uncomplicated  Clinical presentation rationale: Clinical judgement  Clinical Decision making (complexity): Low  Complexity  Frequency: Today  Predicted Duration of Therapy Intervention (days/wks): Today    Anticipated Discharge Disposition: Home with Assist and Home PT   Anticipated Equipment Needs at Discharge: Has a walker. Needs to use it at all times when up ambulating   Risks and Benefits of therapy have been explained: Yes  Patient, Family & other staff in agreement with plan of care: Yes  Clinical Impression Comments: PT evaluation ordered for weakness.Pt is very active and independent at baseline. She currently demonstrates with impaired balance and gait putting her at increased risk for falls. Pt would benefit from home PT to address these impairments. Also recommended to pt and family that pt use walker at all times for safety.     Total Eval Time:15

## 2019-11-01 NOTE — DISCHARGE SUMMARY
[facility]    Discharge Summary  Hospitalist    Date of Admission:  10/30/2019  Date of Discharge:  11/1/2019  Discharging Provider: Chintan Mejia MD  Date of Service (when I saw the patient): 11/01/19    Discharge Diagnoses   Active Problems:    Acute cystitis without hematuria(10/30/2019)    Sepsis (H) (10/31/2019)    Type 2 demand ischemia    CKD stage 3 with baseline GFR 40-50    Elevated CK    History of Present Illness   Bianca Greene is an 88 year old female who presented with fall.  Please see admission H+P for additional details.    Hospital Course   Bianca Greene is a remarkably healthy 88 year old female who is extremely hard of hearing presents with a fall at home and fevers.  She is found to have acute urinary tract infection.  She was treated with empiric ceftriaxone initially.  During her hospitalization, she was found to be in atrial fibrillation with some rapid ventricular response, however this has now reverted back to normal sinus rhythm.  Her urine culture grew greater than 100,000 colonies of E. coli sensitive to quinolones.  She was switched to ciprofloxacin without difficulty.  On physical exam, the patient had a very noticeable heart murmur, however echocardiogram obtained did not show any significant explanation for this.  We will have her follow-up with her primary care physician regarding this.  Physical and occupational therapy evaluate the patient, and deemed her safe to return home.  She is discharged to the care of her family.    Chintan Mejia MD      Significant Results and Procedures   See below.    Pending Results   These results will be followed up by David Hughes    Unresulted Labs Ordered in the Past 30 Days of this Admission     Date and Time Order Name Status Description    10/30/2019 1000 Blood culture Preliminary     10/30/2019 0951 Blood culture Preliminary           Code Status   Full Code       Primary Care Physician   David CARTER  Saul    Physical Exam   Temp: 97.7  F (36.5  C) Temp src: Tympanic BP: 172/79(in pain) Pulse: 80 Heart Rate: 65 Resp: 16 SpO2: 98 % O2 Device: None (Room air)    Vitals:    10/30/19 1310 11/01/19 0500   Weight: 64.4 kg (141 lb 15.6 oz) 65.8 kg (145 lb 1 oz)     Vital Signs with Ranges  Temp:  [97.4  F (36.3  C)-99  F (37.2  C)] 97.7  F (36.5  C)  Pulse:  [71-80] 80  Heart Rate:  [65-81] 65  Resp:  [14-17] 16  BP: (117-172)/(58-79) 172/79  SpO2:  [96 %-99 %] 98 %  I/O last 3 completed shifts:  In: 1140 [P.O.:240; I.V.:900]  Out: 50 [Urine:50]    Constitutional - AA, NAD  HEENT - bruise on left temple, no laceration or abrasion  Neck - supple, no masses, no JVD  CVS - S1 S2 RRR, loud 5/6 systolic murmur, rubs, gallops  Respiratory - CTA b/l  GI - soft, NT, ND, + bowel sounds, no organomegaly  Musculoskeletal - no LE edema, no lesions  Neuro - severely hard of hearing, oriented x 3, no gross focal deficits  Psych - appropriate affect    Discharge Disposition   Discharged to home  Condition at discharge: Stable    Consultations This Hospital Stay   PHYSICAL THERAPY ADULT IP CONSULT  OCCUPATIONAL THERAPY ADULT IP CONSULT    Time Spent on this Encounter   IChintan MD, MD, personally saw the patient today and spent greater than 30 minutes discharging this patient.    Discharge Orders      Home care nursing referral      Home Care PT Referral for Hospital Discharge      Home Care OT Referral for Hospital Discharge      Reason for your hospital stay    Fall, UTI     Follow-up and recommended labs and tests     Follow up with primary care provider, David Hughes, within 7 days for hospital follow- up.  No follow up labs or test are needed.     Activity    Your activity upon discharge: activity as tolerated     MD face to face encounter    Documentation of Face to Face and Certification for Home Health Services    I certify that patient: Bianca Greene is under my care and that I, or a nurse practitioner  or physician's assistant working with me, had a face-to-face encounter that meets the physician face-to-face encounter requirements with this patient on: November 1, 2019.    This encounter with the patient was in whole, or in part, for the following medical condition, which is the primary reason for home health care: fall, weakness.    I certify that, based on my findings, the following services are medically necessary home health services: Nursing, Occupational Therapy and Physical Therapy.    My clinical findings support the need for the above services because: Nurse is needed: To provide assessment and oversight required in the home to assure adherence to the medical plan due to: new medication..    Further, I certify that my clinical findings support that this patient is homebound (i.e. absences from home require considerable and taxing effort and are for medical reasons or Baptism services or infrequently or of short duration when for other reasons) because: Requires assistance of another person or specialized equipment to access medical services because patient: Requires supervision of another for safe transfer...    Based on the above findings. I certify that this patient is confined to the home and needs intermittent skilled nursing care, physical therapy and/or speech therapy.  The patient is under my care, and I have initiated the establishment of the plan of care.  This patient will be followed by a physician who will periodically review the plan of care.  Physician/Provider to provide follow up care: David Hughes    Attending [facility] physician (the Medicare certified [place] provider): Chintan Mejia MD, MD  Physician Signature: See electronic signature associated with these discharge orders.  Date: 11/1/2019     Full Code     Diet    Follow this diet upon discharge: Orders Placed This Encounter      Advance Diet as Tolerated: Regular Diet Adult     Discharge Medications   Current Discharge  Medication List      START taking these medications    Details   ciprofloxacin (CIPRO) 500 MG tablet Take 1 tablet (500 mg) by mouth every 12 hours for 12 days  Qty: 24 tablet, Refills: 0    Associated Diagnoses: Acute cystitis without hematuria           Allergies   No Known Allergies  Data   Most Recent 3 CBC's:  Recent Labs   Lab Test 11/01/19  0519 10/31/19  0527 10/30/19  1001   WBC 2.9* 1.6* 4.1   HGB 11.2* 11.8 14.7   MCV 87 87 87   PLT 98* 102* 158      Most Recent 3 BMP's:  Recent Labs   Lab Test 11/01/19  0519 10/31/19  0527 10/30/19  1001    138 133   POTASSIUM 3.2* 3.4 3.8   CHLORIDE 108 110* 102   CO2 23 21 24   BUN 16 18 19   CR 1.01 0.96 1.14*   ANIONGAP 6 7 7   LUKE 8.7 8.6 9.5   * 92 94     Most Recent 2 LFT's:  Recent Labs   Lab Test 10/30/19  1001 07/06/15  1029   AST 60* 50*   ALT 35 47   ALKPHOS 94 115   BILITOTAL 0.6 0.4     Most Recent INR's and Anticoagulation Dosing History:  Anticoagulation Dose History     There is no flowsheet data to display.        Most Recent 3 Troponin's:  Recent Labs   Lab Test 10/31/19  0001 10/30/19  1803 10/30/19  1001   TROPI 0.047* 0.064* 0.049*     Most Recent Cholesterol Panel:No lab results found.  Most Recent 6 Bacteria Isolates From Any Culture (See EPIC Reports for Culture Details):  Recent Labs   Lab Test 10/30/19  1330 10/30/19  1020 10/30/19  1001 10/30/19  0943 07/04/13  2255   CULT No MRSA isolated No growth after 2 days No growth after 2 days >100,000 colonies/mL  Escherichia coli  * >100,000 colonies/mL Escherichia coli     Most Recent TSH, T4 and A1c Labs:No lab results found.  Results for orders placed or performed during the hospital encounter of 10/30/19   XR Chest Port 1 View    Narrative    PROCEDURE:  XR CHEST PORT 1 VW    HISTORY:  dyspnea.     COMPARISON:  None.    FINDINGS:   The cardiac silhouette is normal in size. The pulmonary vasculature is  normal.  There is a retrocardiac density possibly a hiatal hernia.  There are  linear opacities in both lungs representing atelectasis or  scarring. No pleural effusion or pneumothorax.      Impression    IMPRESSION:  Linear atelectasis or scarring bilaterally. Possibly a  hiatal hernia seen behind the heart.      ELSA MCQUEEN MD   CT Head w/o Contrast    Narrative    PROCEDURE: CT HEAD W/O CONTRAST     HISTORY: fall, head trauma.    COMPARISON: None.    TECHNIQUE:  Helical images of the head from the foramen magnum to the  vertex were obtained without contrast.    FINDINGS: There is some enlargement of the ventricular system and  cortical sulci consistent with atrophy. There is extensive white  matter low-density both hemispheres consistent with small vessel  disease. 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 There are no masses  ventricular shifts or extracerebral collections. The cranial vault is  intact.  The visualized paranasal sinuses are clear.      Impression    IMPRESSION: No acute brain abnormality.      ELSA MCQUEEN MD   XR Hip Port Left G/E 2 Views    Narrative    PROCEDURE: XR HIP PORTABLE LEFT 2-3 VIEWS 10/30/2019 1:30 PM    HISTORY: fall    COMPARISONS: None.    TECHNIQUE: 2 views.    FINDINGS: There is severe generalized osteopenia. No fracture or  dislocation is seen. There is mild narrowing of the hip joint space  superolaterally with small osteophytes.         Impression    IMPRESSION: No definite fracture. Osteopenia.    JAGDISH CELESTIN MD   Echocardiogram Complete    Narrative             [facility]  Echocardiography Laboratory  [address] [place] [address]     Name: CARITO CURIEL  MRN: [id number]  : 1930  Study Date: 10/30/2019 01:35 PM  Age: 88 yrs  Gender: Female  Reason For Study: Arrythmia - A.Fibrillation  History: Atrial Fibrillation  Ordering Physician: Chintan Mejia MD  Referring Physician: Chintan Mejia MD  Performed By: GLORY     BSA: 1.6 m2  Height: 60 in  Weight: 141  lb  _____________________________________________________________________________  __        Procedure  The patient's rhythm is atrial fibrillation.  _____________________________________________________________________________  __        Interpretation Summary  NO PREVIOUS  No pericardial effusion is present.  Global and regional left ventricular function is normal with an EF of 60-65%.  Diastolic function not assessed due to atrial fibrillation.  The right ventricle is normal size.  Global right ventricular function is normal.  Mild left atrial enlargement is present.  Mild mitral annular calcification is present.  Mild mitral insufficiency is present.  Mild aortic valve calcification is present.  The peak aortic velocity is 2.16 m/sec.  The mean gradient across the aortic valve is9.0 mmHg.  Trace to mild tricuspid insufficiency is present.  Right ventricular systolic pressure is 19mmHg above the right atrial pressure.  The pulmonic valve is normal.  The aorta root is normal.  _____________________________________________________________________________  __        Left Ventricle  Global and regional left ventricular function is normal with an EF of 60-65%.  Diastolic function not assessed due to atrial fibrillation.     Right Ventricle  The right ventricle is normal size. Global right ventricular function is  normal.     Atria  Mild left atrial enlargement is present.     Mitral Valve  Mild mitral annular calcification is present. Mild mitral insufficiency is  present.        Aortic Valve  Mild aortic valve calcification is present. The mean AoV pressure gradient is  9.0 mmHg. The peak AoV pressure gradient is 18.6 mmHg. The peak aortic  velocity is 2.16 m/sec. The mean gradient across the aortic valve is9.0 mmHg.     Tricuspid Valve  Trace to mild tricuspid insufficiency is present. Right ventricular systolic  pressure is 19mmHg above the right atrial pressure.     Pulmonic Valve  The pulmonic valve is normal.      Vessels  The aorta root is normal.     Pericardium  No pericardial effusion is present.     _____________________________________________________________________________  __  MMode/2D Measurements & Calculations  IVSd: 0.93 cm  IVSs: 1.2 cm  LVIDd: 4.6 cm  LVIDs: 3.2 cm  LVPWd: 0.71 cm  LVPWs: 1.1 cm  FS: 31.4 %  LV mass(C)d: 122.1 grams  LV mass(C)dI: 75.9 grams/m2  LV mass(C)sI: 68.3 grams/m2  Ao root diam: 3.3 cm  LA dimension: 3.7 cm     LA/Ao: 1.1  LVOT diam: 1.5 cm  LVOT area: 1.8 cm2  RWT: 0.30     Time Measurements  Pulm. HR: 203.0 BPM     Doppler Measurements & Calculations  MV E max mart: 123.4 cm/sec  MV A max mart: 25.3 cm/sec  MV E/A: 4.9  MV dec slope: 968.8 cm/sec2  MV dec time: 0.13 sec  Ao V2 max: 215.6 cm/sec  Ao max P.6 mmHg  Ao V2 mean: 134.9 cm/sec  Ao mean P.0 mmHg  Ao V2 VTI: 41.5 cm  BRAEDEN(I,D): 2.4 cm2  BRAEDEN(V,D): 2.1 cm2  LV V1 max P.0 mmHg  LV V1 max: 245.0 cm/sec  LV V1 VTI: 53.2 cm  CO(LVOT): 19.2 l/min  CI(LVOT): 11.9 l/min/m2  SV(LVOT): 98.2 ml  SI(LVOT): 61.0 ml/m2  TV max P.5 mmHg  PA V2 max: 111.8 cm/sec  PA max P.0 mmHg  PA mean PG: 3.1 mmHg  PA V2 VTI: 25.6 cm  TR max mart: 215.1 cm/sec  TR max P.5 mmHg  AV Mart Ratio (DI): 1.1     BRAEDEN Index (cm2/m2): 1.5     _____________________________________________________________________________  __           Report approved by: Saul Hanson 10/30/2019 04:05 PM

## 2019-11-01 NOTE — PROGRESS NOTES
Inpatient Occupational Therapy Evaluation    Name: Bianca Greene MRN# [id number]   Age: 88 year old YOB: 1930     Date of Consultation: 2019  Consultation is requested by:  Dr. Mejia  Specific Consultation Request:  Weakness   Primary care provider: David Hughes    General Information:   Onset Date: 19    History of Current Problem/Admission: New onset a-fib (H) [I48.91]  Acute UTI [N39.0]    Prior Level of Function: Pt previously independent in ADLs and utilized a cane outside of her home for functional mobility. Has a FWW in her bathroom. Pt very active at baseline.   Ambulation: 0 - Independent   Transferrin - Independent   Toiletin - Independent    Bathin - Independent   Dressin - Independent   Eatin - Independent   Communication: 0 - Understands/communicates without difficulty  Swallowin - swallows foods/liquids without difficulty  Cognition: 1 - attention or memory deficits    Fall history within the last 6 months: Yes - 1     Current Living Situation: Pt lives with her son in a 1 level home with ramp entry. Bathroom has regular height toilet and a walk in shower. No grab bars.     Current Equipment Used at Home: None. Has walker and cane.     Patient & Family Goals: Pt very eager to return home     Past Medical History:   Past Medical History:   Diagnosis Date     Abdominal pain, left lower quadrant 2002     Chest pain, unspecified 2007     Other screening mammogram 2002     Venous stasis ulcer (H)        Past Surgical History:  Past Surgical History:   Procedure Laterality Date     EYE SURGERY      Bilateral cataracts     HYSTERECTOMY       left hip replacement  2009    RIGHT hip replacement per pt     TONSILLECTOMY         Medications:   Current Facility-Administered Medications   Medication     acetaminophen (TYLENOL) tablet 650 mg     ciprofloxacin (CIPRO) tablet 500 mg     heparin ANTICOAGULANT injection 5,000 Units      lidocaine (LMX4) kit     lidocaine 1 % 0.1-1 mL     melatonin tablet 1 mg     morphine (PF) injection 1 mg     naloxone (NARCAN) injection 0.1-0.4 mg     ondansetron (ZOFRAN-ODT) ODT tab 4 mg    Or     ondansetron (ZOFRAN) injection 4 mg     potassium chloride (KLOR-CON) Packet 20-40 mEq     potassium chloride 10 mEq in 100 mL intermittent infusion with 10 mg lidocaine     potassium chloride 10 mEq in 100 mL sterile water intermittent infusion (premix)     potassium chloride ER (K-DUR/KLOR-CON M) CR tablet 20-40 mEq     sodium chloride (PF) 0.9% PF flush 3 mL     sodium chloride (PF) 0.9% PF flush 3 mL       Weight Bearing Status: Full weight bearing BLE's      Cognitive Status Examination:  Orientation: oriented to person and building. Disoriented to time and city (stated [place])   Level of Consciousness: alert  Follows Commands and Answers Questions: 100% of the time  Personal Safety and Judgement: Intact  Memory: Short-term memory impaired  Comments: Pt very hard of hearing. Family reports baseline memory issues. Pt may have been somewhat confused/couldn't hear when asked about orientation to time.     Pain:   Currently in pain? Yes pt's lower back and posterior head is sore from the fall     Occupational Therapy Evaluation:   Integumentary/Edema: NA   Range of Motion: SFL   Strength: Functional   Muscle Tone Assessment: No issues observed   Coordination: No issues observed     Mobility:   Transfer Skills: CGA sit<>stand from chair  Bed to Chair/Chair to Bed: NA as pt was in the chair upon OT arrival  Toilet Transfer: SBA-CGA   Balance: No LOB      ADLs:   Lower Body Dressing: IND doffing/donning socks in sitting   Grooming: SBA washing her hands standing at the sink     IADLs:   Previous Responsibilities of the Patient: Meal Prep, Housekeeping, Laundry and Finances   Comments: Pt homebound and doesn't go out with son/family. Pretty active with household chores. Likes to cook, bake, do dishes. Pt hasn't needed to  take any medications prior to hospitalization.      Activities of Daily Living Analysis:   Impairments Contributing to Impaired Activities of Daily Living: pain, strength decreased and impaired activity tolerance     Occupational Therapy Interventions: Evaluation only     Clinical Impressions:  Criteria for Skilled Therapeutic Intervention Met: Evaluation Only as pt is discharging home today  OT Diagnosis: Impaired ADLs  Influenced by the following impairments: Pain, weakness, and impaired activity tolerance  Functional limitations due to impairment: Increased difficulties performing ADLs/IADLs compared to baseline of being very active and independent   Clinical presentation: Stable/Uncomplicated  Clinical presentation rationale: Clinical judgement   Clinical Decision making (complexity): Low Complexity  Frequency: 1x  Predicted Duration of Therapy Intervention (days/wks): Today    Anticipated Discharge Disposition: Home with Assist and Home with Home Therapy  Anticipated Equipment Needs at Discharge: None  Risks and Benefits of therapy have been explained: Yes  Patient, Family & other staff in agreement with plan of care: Yes  Clinical Impression Comments: Pt completed ADLs today with SBA-CGA. At baseline, pt previously very active and independent and would benefit from home OT to improve her strength and endurance to help her return to her PLOF. No concerns with pt returning home today with her son.     Total Eval Time: 15

## 2019-11-01 NOTE — PLAN OF CARE
Patient is hospitalized for acute cystitis. She is alert to herself and is slowly becoming more alert to place/situation. Patient is using the pocket talker and now can hear and understand why she is here, she is just forgetful so reminders are beneficial. VSS, afebrile. BP a bit elevated this shift but patient was up and about frequently. HR WNL. Patient did have pain in her back and shoulder twice this shift. She was given Tylenol x2 for this and it was helpful. Patient is voiding adequately and her urine is improving in color and odor with the antibiotics. Bowel sounds audible and active. Patient's IVs are SL. Both have a very small amount of blood under the dressing but they flush well. Lung sounds clear in upper lobes and RML. Bases have fine crackles but this also seems to be improving. Patient is 1 assist with ww and gait belt. She walked a couple laps during the night when she couldn't sleep. Bed in low position, brakes on, call light in reach, and bed alarm on.   Face to face report given with opportunity to observe patient.    Report given to GIANNA Renteria RN   11/1/2019  7:31 AM

## 2019-11-01 NOTE — PLAN OF CARE
VSS. Pt afebrile. No c/o pain.     Lungs: fine crackles in bases. Abd: soft and nontender. Active bowel sounds. Pt had small BM this shift. Regular diet tolerated well; pt eats frequent small snacks. : pt voiding frequently. Urine malodorous. Skin: scattered bruising present from fall. Due to pt being so hard of hearing, pocket talker utilized and was very helpful for pt. Pt SBA with walker. Pt was very impulsive and staff frequently redirected pt to use call light. Pt's son came to visit and left for the night at about 1945 and pt kept standing up, trying to leave and was very adamant that she was going home with him. Staff redirected pt and SAHARA remained with pt for rest of shift.     Both PIVs saline-locked. ABX: IV rocephin. Call light within reach. Alarms activated and audible.

## 2019-11-01 NOTE — PLAN OF CARE
Face to face report given with opportunity to observe patient.    Report given to Nancy DUNHAM RN.     Martita Gatica RN   10/31/2019  11:33 PM

## 2019-11-04 ENCOUNTER — TELEPHONE (OUTPATIENT)
Dept: CASE MANAGEMENT | Facility: HOSPITAL | Age: 84
End: 2019-11-04

## 2019-11-05 ENCOUNTER — TELEPHONE (OUTPATIENT)
Dept: CASE MANAGEMENT | Facility: HOSPITAL | Age: 84
End: 2019-11-05

## 2019-11-05 LAB
BACTERIA SPEC CULT: NORMAL
BACTERIA SPEC CULT: NORMAL
Lab: NORMAL
SPECIMEN SOURCE: NORMAL
SPECIMEN SOURCE: NORMAL

## 2019-11-05 NOTE — TELEPHONE ENCOUNTER
Bianca Greene, was discharged to home on 11/1/19   from [facility]. I spoke today with her son Jamel  regarding her  discharge.   He  indicates they have receive(d) sufficient information upon discharge. Medications were reviewed in full on discharge, including: Medications to be started  and any side effects. Prescriptions were received at discharge and were able to be filled. Medications are being taken as prescribed.   He indicates they have an appointment scheduled for 11/8/19  and have transportation available. He was  able to confirm their appointment time and has  it written down.   He did not have any questions regarding discharge instructions or condition.  Per their request, the following employee (s) can be recognized for their outstanding services delivered:  Care was fantastic for his mother. Very happy with everything.  Suggestions to improve service: Nothing indicated.   He was informed they may receive a survey in the mail from the hospital. Asked if they would kindly complete the survey in order for [facility] to know if services fully met patient needs.

## 2019-11-07 DIAGNOSIS — Z53.9 PERSONS ENCOUNTERING HEALTH SERVICES FOR SPECIFIC PROCEDURES, NOT CARRIED OUT: Primary | ICD-10-CM

## 2019-11-07 PROCEDURE — G0180 MD CERTIFICATION HHA PATIENT: HCPCS | Performed by: FAMILY MEDICINE

## 2019-11-08 ENCOUNTER — ANCILLARY PROCEDURE (OUTPATIENT)
Dept: GENERAL RADIOLOGY | Facility: OTHER | Age: 84
End: 2019-11-08
Attending: PHYSICIAN ASSISTANT
Payer: MEDICARE

## 2019-11-08 ENCOUNTER — OFFICE VISIT (OUTPATIENT)
Dept: FAMILY MEDICINE | Facility: OTHER | Age: 84
End: 2019-11-08
Attending: PHYSICIAN ASSISTANT
Payer: MEDICARE

## 2019-11-08 VITALS
TEMPERATURE: 97.7 F | DIASTOLIC BLOOD PRESSURE: 68 MMHG | WEIGHT: 144.6 LBS | SYSTOLIC BLOOD PRESSURE: 142 MMHG | HEART RATE: 74 BPM | BODY MASS INDEX: 28.24 KG/M2 | OXYGEN SATURATION: 98 %

## 2019-11-08 DIAGNOSIS — M25.572 PAIN IN JOINT, ANKLE AND FOOT, LEFT: ICD-10-CM

## 2019-11-08 DIAGNOSIS — Z09 HOSPITAL DISCHARGE FOLLOW-UP: Primary | ICD-10-CM

## 2019-11-08 DIAGNOSIS — M25.512 ACUTE PAIN OF LEFT SHOULDER: ICD-10-CM

## 2019-11-08 PROCEDURE — 73030 X-RAY EXAM OF SHOULDER: CPT | Mod: TC,LT,FY

## 2019-11-08 PROCEDURE — G0463 HOSPITAL OUTPT CLINIC VISIT: HCPCS

## 2019-11-08 PROCEDURE — 73610 X-RAY EXAM OF ANKLE: CPT | Mod: TC,LT,FY

## 2019-11-08 PROCEDURE — 99213 OFFICE O/P EST LOW 20 MIN: CPT | Performed by: PHYSICIAN ASSISTANT

## 2019-11-08 ASSESSMENT — PAIN SCALES - GENERAL: PAINLEVEL: EXTREME PAIN (8)

## 2019-11-08 NOTE — NURSING NOTE
Chief Complaint   Patient presents with     Hospital F/U       Initial BP (!) 142/64 (BP Location: Right arm, Patient Position: Chair, Cuff Size: Adult Regular)   Pulse 74   Temp 97.7  F (36.5  C) (Tympanic)   Wt 65.6 kg (144 lb 9.6 oz)   SpO2 98%   BMI 28.24 kg/m   Estimated body mass index is 28.24 kg/m  as calculated from the following:    Height as of 10/30/19: 1.524 m (5').    Weight as of this encounter: 65.6 kg (144 lb 9.6 oz).  Medication Reconciliation: complete  Orquidea Sahu LPN

## 2019-11-22 ENCOUNTER — DOCUMENTATION ONLY (OUTPATIENT)
Dept: OTHER | Facility: CLINIC | Age: 84
End: 2019-11-22

## 2020-06-01 ENCOUNTER — TELEPHONE (OUTPATIENT)
Dept: FAMILY MEDICINE | Facility: OTHER | Age: 85
End: 2020-06-01

## 2020-06-01 DIAGNOSIS — R30.0 DYSURIA: Primary | ICD-10-CM

## 2020-06-01 LAB
ALBUMIN UR-MCNC: 30 MG/DL
APPEARANCE UR: CLEAR
BACTERIA #/AREA URNS HPF: ABNORMAL /HPF
BILIRUB UR QL STRIP: NEGATIVE
COLOR UR AUTO: YELLOW
GLUCOSE UR STRIP-MCNC: NEGATIVE MG/DL
HGB UR QL STRIP: ABNORMAL
HYALINE CASTS #/AREA URNS LPF: 2 /LPF
KETONES UR STRIP-MCNC: 5 MG/DL
LEUKOCYTE ESTERASE UR QL STRIP: NEGATIVE
MUCOUS THREADS #/AREA URNS LPF: PRESENT /LPF
NITRATE UR QL: NEGATIVE
PH UR STRIP: 5.5 PH (ref 4.7–8)
RBC #/AREA URNS AUTO: 8 /HPF (ref 0–2)
SOURCE: ABNORMAL
SP GR UR STRIP: 1.02 (ref 1–1.03)
SQUAMOUS #/AREA URNS AUTO: 1 /HPF (ref 0–1)
UROBILINOGEN UR STRIP-MCNC: NORMAL MG/DL (ref 0–2)
WBC #/AREA URNS AUTO: 2 /HPF (ref 0–5)

## 2020-06-01 PROCEDURE — 81001 URINALYSIS AUTO W/SCOPE: CPT | Mod: ZL | Performed by: FAMILY MEDICINE

## 2020-06-02 ENCOUNTER — ANCILLARY PROCEDURE (OUTPATIENT)
Dept: GENERAL RADIOLOGY | Facility: OTHER | Age: 85
End: 2020-06-02
Attending: FAMILY MEDICINE
Payer: MEDICARE

## 2020-06-02 ENCOUNTER — OFFICE VISIT (OUTPATIENT)
Dept: FAMILY MEDICINE | Facility: OTHER | Age: 85
End: 2020-06-02
Attending: FAMILY MEDICINE
Payer: MEDICARE

## 2020-06-02 VITALS
TEMPERATURE: 97.5 F | SYSTOLIC BLOOD PRESSURE: 118 MMHG | WEIGHT: 138 LBS | HEART RATE: 71 BPM | BODY MASS INDEX: 26.95 KG/M2 | DIASTOLIC BLOOD PRESSURE: 66 MMHG | OXYGEN SATURATION: 98 %
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
## 2020-06-02 DIAGNOSIS — N28.9 RENAL INSUFFICIENCY: ICD-10-CM

## 2020-06-02 DIAGNOSIS — R91.1 NODULE OF APEX OF RIGHT LUNG: ICD-10-CM

## 2020-06-02 DIAGNOSIS — R53.83 OTHER FATIGUE: Primary | ICD-10-CM

## 2020-06-02 DIAGNOSIS — R68.83 CHILLS: ICD-10-CM

## 2020-06-02 DIAGNOSIS — R53.83 OTHER FATIGUE: ICD-10-CM

## 2020-06-02 LAB
ANION GAP SERPL CALCULATED.3IONS-SCNC: 6 MMOL/L (ref 3–14)
BASOPHILS # BLD AUTO: 0 10E9/L (ref 0–0.2)
BASOPHILS NFR BLD AUTO: 0.9 %
BUN SERPL-MCNC: 32 MG/DL (ref 7–30)
CALCIUM SERPL-MCNC: 9.6 MG/DL (ref 8.5–10.1)
CHLORIDE SERPL-SCNC: 106 MMOL/L (ref 94–109)
CO2 SERPL-SCNC: 22 MMOL/L (ref 20–32)
CREAT SERPL-MCNC: 1.5 MG/DL (ref 0.52–1.04)
DIFFERENTIAL METHOD BLD: ABNORMAL
EOSINOPHIL # BLD AUTO: 0 10E9/L (ref 0–0.7)
EOSINOPHIL NFR BLD AUTO: 0.9 %
ERYTHROCYTE [DISTWIDTH] IN BLOOD BY AUTOMATED COUNT: 14.7 % (ref 10–15)
GFR SERPL CREATININE-BSD FRML MDRD: 30 ML/MIN/{1.73_M2}
GLUCOSE SERPL-MCNC: 103 MG/DL (ref 70–99)
HCT VFR BLD AUTO: 40.9 % (ref 35–47)
HGB BLD-MCNC: 13.5 G/DL (ref 11.7–15.7)
IMM GRANULOCYTES # BLD: 0 10E9/L (ref 0–0.4)
IMM GRANULOCYTES NFR BLD: 0.6 %
LYMPHOCYTES # BLD AUTO: 1 10E9/L (ref 0.8–5.3)
LYMPHOCYTES NFR BLD AUTO: 29.3 %
MCH RBC QN AUTO: 28.4 PG (ref 26.5–33)
MCHC RBC AUTO-ENTMCNC: 33 G/DL (ref 31.5–36.5)
MCV RBC AUTO: 86 FL (ref 78–100)
MONOCYTES # BLD AUTO: 0.4 10E9/L (ref 0–1.3)
MONOCYTES NFR BLD AUTO: 13 %
NEUTROPHILS # BLD AUTO: 1.8 10E9/L (ref 1.6–8.3)
NEUTROPHILS NFR BLD AUTO: 55.3 %
NRBC # BLD AUTO: 0 10*3/UL
NRBC BLD AUTO-RTO: 0 /100
PLATELET # BLD AUTO: 142 10E9/L (ref 150–450)
POTASSIUM SERPL-SCNC: 3.8 MMOL/L (ref 3.4–5.3)
RBC # BLD AUTO: 4.75 10E12/L (ref 3.8–5.2)
SODIUM SERPL-SCNC: 134 MMOL/L (ref 133–144)
WBC # BLD AUTO: 3.2 10E9/L (ref 4–11)

## 2020-06-02 PROCEDURE — 71046 X-RAY EXAM CHEST 2 VIEWS: CPT | Mod: TC

## 2020-06-02 PROCEDURE — 80048 BASIC METABOLIC PNL TOTAL CA: CPT | Mod: ZL | Performed by: FAMILY MEDICINE

## 2020-06-02 PROCEDURE — 85025 COMPLETE CBC W/AUTO DIFF WBC: CPT | Mod: ZL | Performed by: FAMILY MEDICINE

## 2020-06-02 PROCEDURE — 99214 OFFICE O/P EST MOD 30 MIN: CPT | Performed by: FAMILY MEDICINE

## 2020-06-02 PROCEDURE — G0463 HOSPITAL OUTPT CLINIC VISIT: HCPCS

## 2020-06-02 PROCEDURE — 86618 LYME DISEASE ANTIBODY: CPT | Mod: ZL | Performed by: FAMILY MEDICINE

## 2020-06-02 PROCEDURE — 36415 COLL VENOUS BLD VENIPUNCTURE: CPT | Mod: ZL | Performed by: FAMILY MEDICINE

## 2020-06-02 ASSESSMENT — PAIN SCALES - GENERAL: PAINLEVEL: NO PAIN (0)

## 2020-06-02 NOTE — NURSING NOTE
Chief Complaint   Patient presents with     Dizziness       Initial /66   Pulse 71   Temp 97.5  F (36.4  C)   Wt 62.6 kg (138 lb)   SpO2 98%   BMI 26.95 kg/m   Estimated body mass index is 26.95 kg/m  as calculated from the following:    Height as of 10/30/19: 1.524 m (5').    Weight as of this encounter: 62.6 kg (138 lb).  Medication Reconciliation: complete  Noemi Leal LPN

## 2020-06-02 NOTE — PROGRESS NOTES
Subjective     Bianca Greene is a 89 year old female who presents to clinic today for the following health issues:    HPI   Dizziness      Duration: 3-4 days    Description   Feeling faint:  no   Feeling like the surroundings are moving: no   Loss of consciousness or falls: no     Intensity:  moderate    Accompanying signs and symptoms:   Nausea/vomitting: no   Palpitations: no   Weakness in arms or legs: YES- unsteady  Vision or speech changes: no   Ringing in ears (Tinnitus): no   Hearing loss related to dizziness: no   Other (fevers/chills/sweating/dyspnea): YES- chills, sleeping more    History (similar episodes/head trauma/previous evaluation/recent bleeding): has had a couple wood ticks    Precipitating or alleviating factors (new meds/chemicals): None  Worse with activity/head movement: no     Therapies tried and outcome: None    PROBLEMS TO ADD ON...    Patient Active Problem List   Diagnosis     Venous stasis ulcer (H)     SNHL (sensorineural hearing loss)     Tinnitus     Impacted cerumen     Acute cystitis     Sepsis (H)     Senile nuclear sclerosis     Past Surgical History:   Procedure Laterality Date     EYE SURGERY  2010    Bilateral cataracts     HYSTERECTOMY       left hip replacement  2009    RIGHT hip replacement per pt     TONSILLECTOMY         Social History     Tobacco Use     Smoking status: Never Smoker     Smokeless tobacco: Never Used   Substance Use Topics     Alcohol use: No     Family History   Problem Relation Age of Onset     Heart Disease Father         heart disease; cause of death     Dementia Mother 89        cause of death     Osteoporosis Mother      Cancer - colorectal Sister      Cancer Son 54        brain     Cancer Sister 62        leukemia; cause of death     Cerebrovascular Disease Son      Suicide Brother          No current outpatient medications on file.     No Known Allergies    PROBLEMS TO ADD ON...many tick bites son worried about lyme.  He has had it.       Reviewed and updated as needed this visit by Provider         Review of Systems   Constitutional, HEENT, cardiovascular, pulmonary, gi and gu systems are negative, except as otherwise noted.      Objective    /66   Pulse 71   Temp 97.5  F (36.4  C)   Wt 62.6 kg (138 lb)   SpO2 98%   BMI 26.95 kg/m    Body mass index is 26.95 kg/m .  Physical Exam   GENERAL: healthy, alert and no distress  NECK: no adenopathy, no asymmetry, masses, or scars and thyroid normal to palpation  RESP: lungs clear to auscultation - no rales, rhonchi or wheezes  CV: regular rate and rhythm, normal S1 S2, no S3 or S4, no murmur, click or rub, no peripheral edema and peripheral pulses strong  ABDOMEN: soft, nontender, no hepatosplenomegaly, no masses and bowel sounds normal  MS: no gross musculoskeletal defects noted, no edema    Diagnostic Test Results:cbc showing stable (improved) pancytopenia.  Bmp showing elevated creatinine from previous.  cxr question right lung nodule.    Labs reviewed in Epic        Assessment & Plan       ICD-10-CM    1. Other fatigue  R53.83 CBC with platelets and differential     Basic metabolic panel     XR CHEST 2 VW (Clinic Performed)     Lyme Disease Annamaria with reflex to WB Serum   2. Chills  R68.83 CBC with platelets and differential     Basic metabolic panel     XR CHEST 2 VW (Clinic Performed)     Lyme Disease Annamaria with reflex to WB Serum   3. Tick bite, initial encounter  W57.XXXA CBC with platelets and differential     Basic metabolic panel     XR CHEST 2 VW (Clinic Performed)     Lyme Disease Annamaria with reflex to WB Serum   4. Nodule of apex of right lung  R91.1 XR CHEST 2 VW (Clinic Performed)   5. Renal insufficiency  N28.9 Basic metabolic panel      lengthy review.  No source of fatigue or sx found.  Normal UA yesterday.  Cell counts stable.  Creatinine worse, which is concerning, and unexpected questionable nodule in lung as well.  Presented options.  Lyme pending.  Update bmp and cxr in a  month and see what this shows prior to further action.  D/w son in detail and he agrees with plan.  Sooner with worsening concerns.    BMI:   Estimated body mass index is 26.95 kg/m  as calculated from the following:    Height as of 10/30/19: 1.524 m (5').    Weight as of this encounter: 62.6 kg (138 lb).               No follow-ups on file.    David Hughes MD  [facility]

## 2020-06-03 LAB — B BURGDOR IGG+IGM SER QL: 0.2 (ref 0–0.89)

## 2020-07-02 DIAGNOSIS — R91.1 NODULE OF APEX OF RIGHT LUNG: ICD-10-CM

## 2020-07-02 DIAGNOSIS — N28.9 RENAL INSUFFICIENCY: ICD-10-CM

## 2020-07-02 LAB
ANION GAP SERPL CALCULATED.3IONS-SCNC: 2 MMOL/L (ref 3–14)
BUN SERPL-MCNC: 18 MG/DL (ref 7–30)
CALCIUM SERPL-MCNC: 9.7 MG/DL (ref 8.5–10.1)
CHLORIDE SERPL-SCNC: 107 MMOL/L (ref 94–109)
CO2 SERPL-SCNC: 29 MMOL/L (ref 20–32)
CREAT SERPL-MCNC: 1.3 MG/DL (ref 0.52–1.04)
GFR SERPL CREATININE-BSD FRML MDRD: 36 ML/MIN/{1.73_M2}
GLUCOSE SERPL-MCNC: 100 MG/DL (ref 70–99)
POTASSIUM SERPL-SCNC: 4.5 MMOL/L (ref 3.4–5.3)
SODIUM SERPL-SCNC: 138 MMOL/L (ref 133–144)

## 2020-07-02 PROCEDURE — 80048 BASIC METABOLIC PNL TOTAL CA: CPT | Mod: ZL | Performed by: FAMILY MEDICINE

## 2020-07-02 PROCEDURE — 36415 COLL VENOUS BLD VENIPUNCTURE: CPT | Mod: ZL | Performed by: FAMILY MEDICINE

## 2020-07-02 PROCEDURE — 71046 X-RAY EXAM CHEST 2 VIEWS: CPT | Mod: TC

## 2021-07-20 ENCOUNTER — OFFICE VISIT (OUTPATIENT)
Dept: FAMILY MEDICINE | Facility: OTHER | Age: 86
End: 2021-07-20
Attending: NURSE PRACTITIONER
Payer: MEDICARE

## 2021-07-20 VITALS
OXYGEN SATURATION: 97 % | DIASTOLIC BLOOD PRESSURE: 78 MMHG | BODY MASS INDEX: 28.71 KG/M2 | TEMPERATURE: 97.6 F | HEART RATE: 71 BPM | WEIGHT: 147 LBS | SYSTOLIC BLOOD PRESSURE: 150 MMHG

## 2021-07-20 DIAGNOSIS — R30.0 DYSURIA: ICD-10-CM

## 2021-07-20 DIAGNOSIS — R31.29 MICROSCOPIC HEMATURIA: ICD-10-CM
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
## 2021-07-20 LAB
ALBUMIN UR-MCNC: NEGATIVE MG/DL
AMORPH CRY #/AREA URNS HPF: ABNORMAL /HPF
APPEARANCE UR: CLEAR
BACTERIA #/AREA URNS HPF: ABNORMAL /HPF
BILIRUB UR QL STRIP: NEGATIVE
COLOR UR AUTO: YELLOW
ERYTHROCYTE [DISTWIDTH] IN BLOOD BY AUTOMATED COUNT: 15.9 % (ref 10–15)
GLUCOSE UR STRIP-MCNC: NEGATIVE MG/DL
HCT VFR BLD AUTO: 34.2 % (ref 35–47)
HGB BLD-MCNC: 11.1 G/DL (ref 11.7–15.7)
HGB UR QL STRIP: ABNORMAL
KETONES UR STRIP-MCNC: NEGATIVE MG/DL
LEUKOCYTE ESTERASE UR QL STRIP: NEGATIVE
MCH RBC QN AUTO: 26.4 PG (ref 26.5–33)
MCHC RBC AUTO-ENTMCNC: 32.5 G/DL (ref 31.5–36.5)
MCV RBC AUTO: 81 FL (ref 78–100)
NITRATE UR QL: NEGATIVE
PH UR STRIP: 6 [PH] (ref 5–7)
PLATELET # BLD AUTO: 335 10E3/UL (ref 150–450)
RBC # BLD AUTO: 4.2 10E6/UL (ref 3.8–5.2)
RBC #/AREA URNS AUTO: ABNORMAL /HPF
SP GR UR STRIP: 1.01 (ref 1–1.03)
SQUAMOUS #/AREA URNS AUTO: ABNORMAL /LPF
UROBILINOGEN UR STRIP-ACNC: 0.2 E.U./DL
WBC # BLD AUTO: 5.1 10E3/UL (ref 4–11)
WBC #/AREA URNS AUTO: ABNORMAL /HPF

## 2021-07-20 PROCEDURE — 80053 COMPREHEN METABOLIC PANEL: CPT | Mod: ZL | Performed by: NURSE PRACTITIONER

## 2021-07-20 PROCEDURE — 87086 URINE CULTURE/COLONY COUNT: CPT | Mod: ZL | Performed by: NURSE PRACTITIONER

## 2021-07-20 PROCEDURE — 85014 HEMATOCRIT: CPT | Mod: ZL | Performed by: NURSE PRACTITIONER

## 2021-07-20 PROCEDURE — 36415 COLL VENOUS BLD VENIPUNCTURE: CPT | Mod: ZL | Performed by: NURSE PRACTITIONER

## 2021-07-20 PROCEDURE — 81015 MICROSCOPIC EXAM OF URINE: CPT | Mod: ZL | Performed by: NURSE PRACTITIONER

## 2021-07-20 PROCEDURE — 87015 SPECIMEN INFECT AGNT CONCNTJ: CPT | Mod: ZL | Performed by: NURSE PRACTITIONER

## 2021-07-20 PROCEDURE — G0463 HOSPITAL OUTPT CLINIC VISIT: HCPCS

## 2021-07-20 PROCEDURE — 86617 LYME DISEASE ANTIBODY: CPT | Mod: ZL | Performed by: NURSE PRACTITIONER

## 2021-07-20 PROCEDURE — G0463 HOSPITAL OUTPT CLINIC VISIT: HCPCS | Mod: 25

## 2021-07-20 PROCEDURE — 86618 LYME DISEASE ANTIBODY: CPT | Mod: ZL | Performed by: NURSE PRACTITIONER

## 2021-07-20 PROCEDURE — 99214 OFFICE O/P EST MOD 30 MIN: CPT | Performed by: NURSE PRACTITIONER

## 2021-07-20 ASSESSMENT — PAIN SCALES - GENERAL: PAINLEVEL: NO PAIN (0)

## 2021-07-20 NOTE — PATIENT INSTRUCTIONS
"Patient Education     Tick Bites  Ticks are small arachnids that feed on the blood of rodents, rabbits, birds, deer, dogs, and people. A tick bite may cause redness, itching, and slight swelling at the site. Sometimes you may have no reaction where the tick bit you.  Ticks transmit disease when microbes in their saliva get into your skin and blood. There are over 800 species of ticks. But only two families of ticks, hard ticks and soft ticks, are known to transmit diseases to humans. Ticks often transmit a disease near the end of a meal. The hard ticks tend to attach and feed for hours to days. It may take hours before a hard tick transmits microbes. Soft ticks often feed for less than 1 hour and can transmit diseases quickly. The bites themselves aren't cause for concern. But ticks can carry and pass on 12 different illnesses. These include Lyme disease and Candelario Mountain spotted fever.  Symptoms of tick-related diseases vary depending on the disease. The most common symptoms are:    Fever    Chills    Aches and pains such as headache, extreme tiredness (fatigue), and muscle aches    Joint pain (with Lyme disease)    Rash     A \"bull's eye\" rash is a common symptom of Lyme disease.   How to remove a tick  Not all ticks carry disease. A tick attached to you anywhere from minutes to days may infect you, depending on the type of tick and the germs it carries. If you find a tick, don't panic.    Try to carefully remove the tick with tweezers.    Grasp the tick near its head as close to the skin s surface as possible. Pull without twisting and don't crush the body.    After removing the tick, thoroughly clean the bite area and your hands with rubbing alcohol or soap and water.    Put a live tick in alcohol, or in a sealed bag or container, or flush it down the toilet.  When to get medical care  If you can't easily remove the tick or if you leave the head in your skin, get medical care right away.  Tick paralysis is a " rare disease thought to be caused by a toxin in tick saliva. The symptoms include:    Weakness    Paralysis    Confusion    Fever    Numbness    Headaches    Rashes  If you or someone bitten by a tick has these symptoms, get medical care right away. Removing the tick stops the symptoms in about 24 hours.  If you have a rash or fever within a few weeks of removing a tick, see your healthcare provider. Tell the provider about your recent tick bite, when the bite occurred, and where you most likely acquired the tick.    To prevent disease, you may be given antibiotics. Both Lyme disease and Candelario Mountain spotted fever respond quickly to these medicines.    You may be asked to see your healthcare provider for a blood test. This is to check for Lyme or another tick-related disease.  Follow-up care  Some states and counties have services that test ticks for Lyme disease and other diseases. Check with your local officials to see if this service is available in your area.  If you remove a tick yourself, watch for signs of a tick-borne illness. Symptoms may show up in a few days or weeks after a bite. Call your healthcare provider if you notice any of the following:    Rash. This may spread outward in a ring from a hard, white lump. Or it may move up your arms and legs to your chest.    Fever    Chills    Body aches, joint swelling, and pain    Severe headache  [employer] last reviewed this educational content on 8/1/2019 2000-2021 [employer]. All rights reserved. This information is not intended as a substitute for professional medical care. Always follow your healthcare professional's instructions.    Patient Education     Preventing Lyme Disease  Ticks are small and spider-like. They feed on the blood of animals and humans. They can carry the germs (bacteria) that cause Lyme disease. The bacteria can pass to a person from a tick bite. It is not passed from person to person. Lyme disease can lead to  serious health problems if it is not treated with antibiotics. The best way to prevent Lyme disease is to not be bitten by a tick.     The tick that carries Lyme disease is very small--about the size of a poppy seed.   Preventing tick bites  The disease is carried by the deer tick (blacklegged tick). Young ticks are most likely to carry the bacteria. They are very small, about the size of a poppy seed. They can be found on deer, rodents, and birds. Often the animal brushes the tick onto leaves or other plants as it runs through the woods. Then the tick lives in bushes, grasses, and dead leaves. The most active time of year for infected ticks depends on the part of the country. In the [place] and [place], they are more active from April to September. On the [place] and in the [place], they may be more active from December to February. But you can still be bitten at other times of the year. Below are tips for protecting yourself from tick bites.  Protect your body    Wear clothes that protect you. Clothing can help protect you from tick bites. Wear long pants and long sleeves in outdoor areas where ticks may live. Tuck your shirt into your pants. Tuck pant legs into your socks. And wear light colors. This helps you to easily see ticks on your clothes.    Use insect repellent. Spray insect repellent that has at least 20% DEET on your exposed skin. You can also use it on clothing, shoes, and camping gear. Don't get DEET on children s hands, mouth, or eyes. You can use products with permethrin on clothing, shoes, and camping gear. But don't spray permethrin on skin. It will cause a rash. Follow the directions on the package of the spray you use. For more information on bug sprays, visit the National Pesticide Information Center at npic.[facility].edu.    Stay away from tick-infested areas. Don't brush against grasses, bushes, and other plants. Don't walk through dead leaves and other ground plants. Don't sit on fallen logs.  And stay away from areas with a lot of deer and rodents.    Check yourself for ticks. After being outdoors, check your clothes and skin for ticks. Keep in mind that the ticks may be as small as a poppy seed. If needed, use a handheld mirror. Or use a full-length mirror. This can help you to see all of your skin. Check carefully around areas with hair. And check these areas well:  ? Scalp  ? Behind the ears  ? Armpits  ? Belly button  ? Waist  ? Groin  ? Backs of the knees    Use the clothes dryer. Put clothes or bedding into a clothes dryer for 1 hour at high heat. This has been shown to kill ticks.    Control ticks around your home    Create tick-free safety zones. Ticks that spread Lyme disease live in ground plants. Ticks crawl onto people from shrubs and grasses in and around wooded areas. Cut bushes and other plants away from the deck or patio and any child play areas. Keep all grasses cut short. Remove dead leaves and other dead plants. Don't put children s play equipment near wooded areas. Put wood chips or gravel on the ground between lawns and wooded areas.    Use pesticides. You can apply them yourself or hire a pest control expert. States have different regulations about pesticides. Ask your local health department for information.    Keep deer away. Deer often carry the ticks that can infect you with Lyme disease. Don't try to pet or feed deer. Ask your local garden center about deer-resistant plants. Ask your local health department about deer control in your area.    Prevent ticks on pets. Pets can bring ticks into your home. Use tick control medicine as advised by your . Check your pet for ticks after it comes indoors. Check carefully around the ears.    Ask about local tick-control methods. Some states have tick-control programs. Local health departments may be using methods that can help you control ticks at home.    If you have a tick  If you find a tick on your skin, don't panic. Most  ticks don't carry Lyme disease. And the tick must be attached for 36 to 48 hours before it can infect you. If you find a tick on you, here s what to do:    If the tick is not yet attached to your skin, remove the tick with tweezers or a tissue. Flush it down the toilet. If you see a tick on your clothes, use a piece of tape to lift it off. Don't touch it with your bare hands.    If the tick is attached to your skin:  ? Carefully remove the tick with tweezers. If you don t have tweezers, use your fingers protected by a paper towel or a thin cloth. Grasp the tick as close to your skin as possible. Pull slowly and gently to remove the tick. The tick may not let go right away. Don't pull harder. Be patient. Keep trying gently. Don't twist the head or body as you pull. Don't crush or squeeze the body. This can release the bacteria into your body. Never use a hot match, petroleum jelly, or other products to remove a tick. (Call your healthcare provider right away if you can t remove the tick. Or if part of the tick stays in the skin.)  ? Wash your skin with soap and water after you remove the tick. This will help ensure you remove any bacteria.  ? If you can, save the tick in a tightly sealed glass or plastic container. Take it to your healthcare provider. He or she may be able to have someone tell for sure if it's the type of tick that spreads Lyme.  ? Call your healthcare provider and describe the bite and the tick. You may be asked to come in for an exam. You may be tested for Lyme. You may also be prescribed antibiotics to help prevent infection.  ? Over the next month, watch for the symptoms below, especially a rash at the site of the bite.  When to call your healthcare provider  Call your healthcare provider if you have any symptoms of Lyme disease. Call even if you don t remember being bitten. Symptoms include:    A round, red rash (called a bull s-eye rash) at the site of the tick bite or another part of the  body    Fever of 100.4 F (38  C) or higher, or as directed by your provider    Chills    Tiredness or body aches    Headache or a stiff neck    Joint pain and swelling (arthritis), especially in large joints such as the knees  To learn more    CDC, www.cdc.gov/lyme    American Lyme Disease Foundation, www.aldf.com    [employer] last reviewed this educational content on 6/1/2019 2000-2021 [employer]. All rights reserved. This information is not intended as a substitute for professional medical care. Always follow your healthcare professional's instructions.    Patient Education     Urinary Incontinence, Female (Adult)   Urinary incontinence means loss of bladder control. This problem affects many women, especially as they get older. If you have incontinence, you may be embarrassed to ask for help. But know that this problem can be treated.   Types of Incontinence  There are different types of incontinence. Two of the main types are described here. You can have more than one type.     Stress incontinence. With this type, urine leaks when pressure (stress) is put on the bladder. This may happen when you cough, sneeze, or laugh. Stress incontinence most often occurs because the pelvic floor muscles that support the bladder and urethra are weak. This can happen after pregnancy and vaginal childbirth or a hysterectomy. It can also be due to excess body weight or hormone changes.    Urge incontinence (also called overactive bladder). With this type, a sudden urge to urinate is felt often. This may happen even though there may not be much urine in the bladder. The need to urinate often during the night is common. Urge incontinence most often occurs because of bladder spasms. This may be due to bladder irritation or infection. Damage to bladder nerves or pelvic muscles, constipation, and certain medicines can also lead to urge incontinence.  Treatment depends on the cause. Further evaluation is needed to find  the type you have. This will likely include an exam and certain tests. Based on the results, you and your healthcare provider can then plan treatment. Until a diagnosis is made, the home care tips below can help ease symptoms.   Home care    Do pelvic floor muscle exercises, if they are prescribed. The pelvic floor muscles help support the bladder and urethra. Many women find that their symptoms improve when doing special exercises that strengthen these muscles. To do the exercises, contract the muscles you would use to stop your stream of urine. But do this when you re not urinating. Hold for 10 seconds, then relax. Repeat 10 to 20 times in a row, at least 3 times a day. Your healthcare provider may give you other instructions for how to do the exercises and how often.    Keep a bladder diary. This helps track how often and how much you urinate over a set period of time. Bring this diary with you to your next visit with the provider. The information can help your provider learn more about your bladder problem.    Lose weight, if advised to by your provider. Extra weight puts pressure on the bladder. Your provider can help you create a weight-loss plan that s right for you. This may include exercising more and making certain diet changes.    Don't have foods and drinks that may irritate the bladder. These can include alcohol and caffeinated drinks.    Quit smoking. Smoking and other tobacco use can lead to a long-term (chronic) cough that strains the pelvic floor muscles. Smoking may also damage the bladder and urethra. Talk with your provider about treatments or methods you can use to quit smoking.    If drinking large amounts of fluid makes you have symptoms, you may be advised to limit your fluid intake. You may also be advised to drink most of your fluids during the day and to limit fluids at night.    If you re worried about urine leakage or accidents, you may wear absorbent pads to catch urine. Change the pads  often. This helps reduce discomfort. It may also reduce the risk of skin or bladder infections.    Follow-up care  Follow up with your healthcare provider, or as directed. It may take some to find the right treatment for your problem. But healthy lifestyle changes can be made right away. These include such things as exercising on a regular basis, eating a healthy diet, losing weight (if needed), and quitting smoking. Your treatment plan may include special therapies or medicines. Certain procedures or surgery may also be options. Talk about any questions you have with your provider.   When to seek medical advice  Call the healthcare provider right away if any of these occur:    Fever of 100.4 F (38 C) or higher, or as directed by your provider    Bladder pain or fullness    Belly swelling    Nausea or vomiting    Back pain    Weakness, dizziness, or fainting  [employer] last reviewed this educational content on 1/1/2020 2000-2021 [employer]. All rights reserved. This information is not intended as a substitute for professional medical care. Always follow your healthcare professional's instructions.    Urine culture pending. We will call with results.   Follow up as needed.

## 2021-07-21 LAB
ALBUMIN SERPL-MCNC: 3.5 G/DL (ref 3.4–5)
ALP SERPL-CCNC: 104 U/L (ref 40–150)
ALT SERPL W P-5'-P-CCNC: 17 U/L (ref 0–50)
ANION GAP SERPL CALCULATED.3IONS-SCNC: 10 MMOL/L (ref 3–14)
AST SERPL W P-5'-P-CCNC: 18 U/L (ref 0–45)
BILIRUB SERPL-MCNC: 0.5 MG/DL (ref 0.2–1.3)
BUN SERPL-MCNC: 21 MG/DL (ref 7–30)
CALCIUM SERPL-MCNC: 9.5 MG/DL (ref 8.5–10.1)
CHLORIDE BLD-SCNC: 108 MMOL/L (ref 94–109)
CO2 SERPL-SCNC: 20 MMOL/L (ref 20–32)
CREAT SERPL-MCNC: 1.12 MG/DL (ref 0.52–1.04)
GFR SERPL CREATININE-BSD FRML MDRD: 43 ML/MIN/1.73M2
GLUCOSE BLD-MCNC: 88 MG/DL (ref 70–99)
POTASSIUM BLD-SCNC: 4.6 MMOL/L (ref 3.4–5.3)
PROT SERPL-MCNC: 7.4 G/DL (ref 6.8–8.8)
SODIUM SERPL-SCNC: 138 MMOL/L (ref 133–144)

## 2021-07-22 DIAGNOSIS — R68.89 SUSPECTED LYME DISEASE: Primary | ICD-10-CM

## 2021-07-22 LAB
A PHAG+EHRL SP DNA PNL BLD NAA+NON-PROBE: NEGATIVE
B BURGDOR IGG+IGM SER QL: >10
BABESIA SPEC: NEGATIVE
BACTERIA UR CULT: NORMAL

## 2021-07-22 PROCEDURE — 87207 SMEAR SPECIAL STAIN: CPT | Mod: 26 | Performed by: PEDIATRICS

## 2021-07-22 RX ORDER — DOXYCYCLINE 100 MG/1
100 CAPSULE ORAL 2 TIMES DAILY
Qty: 42 CAPSULE | Refills: 0 | Status: SHIPPED | OUTPATIENT
Start: 2021-07-22 | End: 2021-08-12

## 2021-07-24 LAB
B BURGDOR IGG SER QL IB: POSITIVE
B BURGDOR IGM SER QL IB: NEGATIVE

## 2022-01-01 NOTE — PROGRESS NOTES
" NOTE    Patient name: Bandar Alejo  MRN: [id number]  Mother:  Gaby Alejo    Gestational Age: 39w2d male now 39w 3d on DOL# 1 days    Delivery Clinician:  ADRIAN LEIJA     Peds/FP:  [facility]     PRENATAL / BIRTH HISTORY / DELIVERY   ROM on 2022 at 9:17 AM; Clear  x 4h 15m  (prior to delivery).  Infant delivered on 2022 at 1:32 PM    Gestational Age: 39w2d term male born by Vaginal, Spontaneous to a 31 y.o.   . Cord Information: 3 vessels; Complications: None. MBT: AB+ prenatal labs negative, GBS negative, and prenatal ultrasounds reviewed and normal. Pregnancy complicated by  Maternal h/o THC use- +urine screen 2021 . Mother received  PNV during pregnancy and/or labor. Resuscitation at delivery: Suctioning;Tactile Stimulation. Apgars: 8  and 9 .    Maternal COVID-19 results on admission: Negative    VITAL SIGNS & PHYSICAL EXAM:   Birth Wt: 8 lb 11.9 oz (3965 g) T: 98.4 °F (36.9 °C) (Axillary)  HR: 140   RR: 42        Current Weight:    Weight: 4031 g (8 lb 14.2 oz)    Birth Length: 21       Change in weight since birth: 2% Birth Head circumference: Head Circumference: 34.5 cm (13.58\")                  NORMAL  EXAMINATION    UNLESS OTHERWISE NOTED EXCEPTIONS    (AS NOTED)   General/Neuro   In no apparent distress, appears c/w EGA  Exam/reflexes appropriate for age and gestation None   Skin   Clear w/o abnormal rash, jaundice or lesions  Normal perfusion and peripheral pulses Greek spot -right shoulder, sacrum    HEENT   Normocephalic w/ nl sutures, eyes open.  RR:red reflex present bilaterally, conjunctiva without erythema, no drainage, sclera white, and no edema  ENT patent w/o obvious defects molding   Chest   In no apparent respiratory distress  CTA / RRR. No Murmur None   Abdomen/Genitalia   Soft, nondistended w/o organomegaly  Normal appearance for gender and gestation  normal male and uncircumcised   Trunk  Spine  Extremities " Subjective     Bianca Greene is a 88 year old female who presents to clinic today for the following health issues: Feels improved from hospitalization, but has continuing left shoulder and left ankle pain.    HPI   ED/UC Followup:    [place]  [facility] [place] of [place]: 10-30-19 to   Reason for visit: acute cystitis without hematuria, acute UTI and new onset A-fib  Current Status: no fevers. Complains of shoulder pain left d/t recent falls, lump on head, numb in hands. Doctor heard heart murmur in hospital  Recheck b/p - 1428/68           Patient Active Problem List   Diagnosis     Venous stasis ulcer (H)     SNHL (sensorineural hearing loss)     Tinnitus     Impacted cerumen     Acute cystitis     Sepsis (H)     Senile nuclear sclerosis     Past Surgical History:   Procedure Laterality Date     EYE SURGERY  2010    Bilateral cataracts     HYSTERECTOMY       left hip replacement  2009    RIGHT hip replacement per pt     TONSILLECTOMY         Social History     Tobacco Use     Smoking status: Never Smoker     Smokeless tobacco: Never Used   Substance Use Topics     Alcohol use: No     Family History   Problem Relation Age of Onset     Heart Disease Father         heart disease; cause of death     Dementia Mother 89        cause of death     Osteoporosis Mother      Cancer - colorectal Sister      Cancer Son 54        brain     Cancer Sister 62        leukemia; cause of death     Cerebrovascular Disease Son      Suicide Brother          Current Outpatient Medications   Medication Sig Dispense Refill     ciprofloxacin (CIPRO) 500 MG tablet Take 1 tablet (500 mg) by mouth every 12 hours for 12 days 24 tablet 0     No Known Allergies      Reviewed and updated as needed this visit by Provider         Review of Systems   ROS COMP: Constitutional, HEENT, cardiovascular, pulmonary, gi and gu systems are negative, except as otherwise noted.      Objective    BP (!) 142/68   Pulse 74   Temp 97.7  F (36.5  C) (Tympanic)   Wt  65.6 kg (144 lb 9.6 oz)   SpO2 98%   BMI 28.24 kg/m    Body mass index is 28.24 kg/m .  Physical Exam     Physical Exam:  Constitutional: healthy, alert and no distress  CV: RRR. No murmur. No pretibial edema  Pulm: Lungs clear to auscultation without wheeze, rales or rhonchi.  Abdomen: Round, soft. Normal BS. NTTP. No rebound or guarding. No mass or organomegaly.  Skin: no suspicious lesions or rashes  MS:. Fair ROM left shoulder. TTP posterior girdle. Mild left ankle edema . Diffuse TTP  Psych: mood is euthymic with corresponding affect                Assessment & Plan     (Z09) Hospital discharge follow-up  (primary encounter diagnosis)  Comment: Improved      (M25.512) Acute pain of left shoulder  Comment: xray normal  Plan: XR SHOULDER LEFT G/E 2 VIEWS (Clinic Performed)            (M25.572) Pain in joint, ankle and foot, left  Comment: xray normal. No fracture. RICE  Plan: XR Ankle Left G/E 3 Views                 BMI:   Estimated body mass index is 28.24 kg/m  as calculated from the following:    Height as of 10/30/19: 1.524 m (5').    Weight as of this encounter: 65.6 kg (144 lb 9.6 oz).         Follow up as needed          No follow-ups on file.    KYLE Weeks  [facility]       Straight w/o obvious defects  Active, mobile without deformity none       INTAKE AND OUTPUT     Feeding: bottle feeding fair- well    Intake & Output (last day)         03/14 0701  03/15 0700 03/15 07 0700    P.O. 83     Total Intake(mL/kg) 83 (20.6)     Net +83           Stool Unmeasured Occurrence 1 x           LABS     Infant Blood Type: unknown  ROSE MARIE: N/A   Passive AB:N/A    Recent Results (from the past 24 hour(s))   Blood Bank Cord Blood Hold Tube    Collection Time: 22  1:35 PM    Specimen: Umbilical Cord; Cord Blood   Result Value Ref Range    Extra Tube Hold for add-ons.    Urine Drug Screen - Urine, Clean Catch    Collection Time: 03/15/22  3:05 AM    Specimen: Urine, Clean Catch   Result Value Ref Range    Amphet/Methamphet, Screen Negative Negative    Barbiturates Screen, Urine Negative Negative    Benzodiazepine Screen, Urine Negative Negative    Cocaine Screen, Urine Negative Negative    Opiate Screen Negative Negative    THC, Screen, Urine Negative Negative    Methadone Screen, Urine Negative Negative    Oxycodone Screen, Urine Negative Negative       TCI:       TESTING      BP:   pending    Location: Right Arm              Location: Right Leg    CCHD     Car Seat Challenge Test     Hearing Screen       Screen         Immunization History   Administered Date(s) Administered    Hep B, Adolescent or Pediatric 2022       As indicated in active problem list and/or as listed as below. The plan of care has been / will be discussed with the family/primary caregiver(s).    RECOGNIZED PROBLEMS & IMMEDIATE PLAN(S) OF CARE:     Patient Active Problem List    Diagnosis Date Noted    *Single liveborn, born in hospital, delivered by vaginal delivery 2022     Note Last Updated: 2022     Routine care  ------------------------------------------------------------------------------        affected by maternal use of cannabis 2022     Note Last Updated: 2022      Maternal UDS +THC 2021, no subsequent screens noted  Infant UDS negative  Plan: SW Consult, Cord tox  ------------------------------------------------------------------------------        FOLLOW UP:     Check/ follow up: cordstat toxicology and social service consult    Other Issues: GBS Plan: GBS negative, infant clinically well on exam, routine  care.    CARLOS Calix  [facility]  Documentation reviewed and electronically signed on 2022 at 08:13 EDT     DISCLAIMER:      “As of 2021, as required by the Federal 21st Century Cures Act, medical records (including provider notes and laboratory/imaging results) are to be made available to patients and/or their designees as soon as the documents are signed/resulted. While the intention is to ensure transparency and to engage patients in their healthcare, this immediate access may create unintended consequences because this document uses language intended for communication between medical providers for interpretation with the entirety of the patient’s clinical picture in mind. It is recommended that patients and/or their designees review all available information with their primary or specialist providers for explanation and to avoid misinterpretation of this information.”  Attending Physician Addendum:    I have reviewed this patient's active problem list and corresponding treatment plan, while providing supervision of the management of this patient by the Advanced Practice Provider. This patient's pertinent monitoring, laboratory and/or radiological data were reviewed. To the best of my knowledge, the documentation represents an accurate description of this patient's current status, with any exceptions noted below.  Continue  care    Pawan England MD  Attending Neonatologist  [facility] - Neonatology  Documentation reviewed and electronically  signed on 2022 at 13:06 EDT

## 2022-04-27 ENCOUNTER — APPOINTMENT (OUTPATIENT)
Dept: GENERAL RADIOLOGY | Facility: HOSPITAL | Age: 87
End: 2022-04-27
Attending: EMERGENCY MEDICINE
Payer: MEDICARE

## 2022-04-27 ENCOUNTER — HOSPITAL ENCOUNTER (EMERGENCY)
Facility: HOSPITAL | Age: 87
Discharge: ANOTHER HOSPITAL | End: 2022-04-27
Attending: NURSE PRACTITIONER | Admitting: NURSE PRACTITIONER
Payer: MEDICARE

## 2022-04-27 ENCOUNTER — TRANSFERRED RECORDS (OUTPATIENT)
Dept: HEALTH INFORMATION MANAGEMENT | Facility: CLINIC | Age: 87
End: 2022-04-27

## 2022-04-27 VITALS
DIASTOLIC BLOOD PRESSURE: 101 MMHG | SYSTOLIC BLOOD PRESSURE: 178 MMHG | TEMPERATURE: 97.7 F | RESPIRATION RATE: 18 BRPM | HEART RATE: 92 BPM | OXYGEN SATURATION: 99 %

## 2022-04-27 DIAGNOSIS — S72.002A HIP FRACTURE, LEFT, CLOSED, INITIAL ENCOUNTER (H): ICD-10-CM

## 2022-04-27 LAB
ALBUMIN SERPL-MCNC: 3.4 G/DL (ref 3.4–5)
ALP SERPL-CCNC: 100 U/L (ref 40–150)
ALT SERPL W P-5'-P-CCNC: 17 U/L (ref 0–50)
ANION GAP SERPL CALCULATED.3IONS-SCNC: 7 MMOL/L (ref 3–14)
AST SERPL W P-5'-P-CCNC: 15 U/L (ref 0–45)
BASOPHILS # BLD AUTO: 0 10E3/UL (ref 0–0.2)
BASOPHILS NFR BLD AUTO: 1 %
BILIRUB SERPL-MCNC: 0.4 MG/DL (ref 0.2–1.3)
BUN SERPL-MCNC: 23 MG/DL (ref 7–30)
CALCIUM SERPL-MCNC: 9.3 MG/DL (ref 8.5–10.1)
CHLORIDE BLD-SCNC: 109 MMOL/L (ref 94–109)
CO2 SERPL-SCNC: 24 MMOL/L (ref 20–32)
CREAT SERPL-MCNC: 1.21 MG/DL (ref 0.52–1.04)
EOSINOPHIL # BLD AUTO: 0 10E3/UL (ref 0–0.7)
EOSINOPHIL NFR BLD AUTO: 1 %
ERYTHROCYTE [DISTWIDTH] IN BLOOD BY AUTOMATED COUNT: 18 % (ref 10–15)
GFR SERPL CREATININE-BSD FRML MDRD: 42 ML/MIN/1.73M2
GLUCOSE BLD-MCNC: 99 MG/DL (ref 70–99)
HCT VFR BLD AUTO: 36.6 % (ref 35–47)
HGB BLD-MCNC: 11.2 G/DL (ref 11.7–15.7)
HOLD SPECIMEN: NORMAL
IMM GRANULOCYTES # BLD: 0 10E3/UL
IMM GRANULOCYTES NFR BLD: 1 %
INR PPP: 1.03 (ref 0.85–1.15)
LYMPHOCYTES # BLD AUTO: 0.9 10E3/UL (ref 0.8–5.3)
LYMPHOCYTES NFR BLD AUTO: 19 %
MCH RBC QN AUTO: 23.3 PG (ref 26.5–33)
MCHC RBC AUTO-ENTMCNC: 30.6 G/DL (ref 31.5–36.5)
MCV RBC AUTO: 76 FL (ref 78–100)
MONOCYTES # BLD AUTO: 0.3 10E3/UL (ref 0–1.3)
MONOCYTES NFR BLD AUTO: 7 %
NEUTROPHILS # BLD AUTO: 3.5 10E3/UL (ref 1.6–8.3)
NEUTROPHILS NFR BLD AUTO: 71 %
NRBC # BLD AUTO: 0 10E3/UL
NRBC BLD AUTO-RTO: 0 /100
PLATELET # BLD AUTO: 290 10E3/UL (ref 150–450)
POTASSIUM BLD-SCNC: 3.9 MMOL/L (ref 3.4–5.3)
PROT SERPL-MCNC: 7.2 G/DL (ref 6.8–8.8)
RBC # BLD AUTO: 4.81 10E6/UL (ref 3.8–5.2)
SARS-COV-2 RNA RESP QL NAA+PROBE: NEGATIVE
SODIUM SERPL-SCNC: 140 MMOL/L (ref 133–144)
WBC # BLD AUTO: 4.9 10E3/UL (ref 4–11)

## 2022-04-27 PROCEDURE — 80053 COMPREHEN METABOLIC PANEL: CPT | Performed by: EMERGENCY MEDICINE

## 2022-04-27 PROCEDURE — 96375 TX/PRO/DX INJ NEW DRUG ADDON: CPT

## 2022-04-27 PROCEDURE — C9803 HOPD COVID-19 SPEC COLLECT: HCPCS

## 2022-04-27 PROCEDURE — 93005 ELECTROCARDIOGRAM TRACING: CPT

## 2022-04-27 PROCEDURE — 99285 EMERGENCY DEPT VISIT HI MDM: CPT | Mod: 25

## 2022-04-27 PROCEDURE — 73502 X-RAY EXAM HIP UNI 2-3 VIEWS: CPT

## 2022-04-27 PROCEDURE — 96376 TX/PRO/DX INJ SAME DRUG ADON: CPT

## 2022-04-27 PROCEDURE — 93010 ELECTROCARDIOGRAM REPORT: CPT | Performed by: INTERNAL MEDICINE

## 2022-04-27 PROCEDURE — 36415 COLL VENOUS BLD VENIPUNCTURE: CPT | Performed by: EMERGENCY MEDICINE

## 2022-04-27 PROCEDURE — 85610 PROTHROMBIN TIME: CPT | Performed by: EMERGENCY MEDICINE

## 2022-04-27 PROCEDURE — 250N000011 HC RX IP 250 OP 636: Performed by: EMERGENCY MEDICINE

## 2022-04-27 PROCEDURE — 85004 AUTOMATED DIFF WBC COUNT: CPT | Performed by: EMERGENCY MEDICINE

## 2022-04-27 PROCEDURE — 96374 THER/PROPH/DIAG INJ IV PUSH: CPT

## 2022-04-27 PROCEDURE — 99285 EMERGENCY DEPT VISIT HI MDM: CPT | Performed by: EMERGENCY MEDICINE

## 2022-04-27 PROCEDURE — 71045 X-RAY EXAM CHEST 1 VIEW: CPT

## 2022-04-27 PROCEDURE — U0003 INFECTIOUS AGENT DETECTION BY NUCLEIC ACID (DNA OR RNA); SEVERE ACUTE RESPIRATORY SYNDROME CORONAVIRUS 2 (SARS-COV-2) (CORONAVIRUS DISEASE [COVID-19]), AMPLIFIED PROBE TECHNIQUE, MAKING USE OF HIGH THROUGHPUT TECHNOLOGIES AS DESCRIBED BY CMS-2020-01-R: HCPCS | Performed by: EMERGENCY MEDICINE

## 2022-04-27 RX ORDER — FENTANYL CITRATE 50 UG/ML
50 INJECTION, SOLUTION INTRAMUSCULAR; INTRAVENOUS ONCE
Status: COMPLETED | OUTPATIENT
Start: 2022-04-27 | End: 2022-04-27

## 2022-04-27 RX ORDER — HYDROMORPHONE HYDROCHLORIDE 1 MG/ML
0.5 INJECTION, SOLUTION INTRAMUSCULAR; INTRAVENOUS; SUBCUTANEOUS
Status: COMPLETED | OUTPATIENT
Start: 2022-04-27 | End: 2022-04-27

## 2022-04-27 RX ADMIN — HYDROMORPHONE HYDROCHLORIDE 0.5 MG: 1 INJECTION, SOLUTION INTRAMUSCULAR; INTRAVENOUS; SUBCUTANEOUS at 15:11

## 2022-04-27 RX ADMIN — FENTANYL CITRATE 50 MCG: 50 INJECTION INTRAMUSCULAR; INTRAVENOUS at 14:02

## 2022-04-27 RX ADMIN — HYDROMORPHONE HYDROCHLORIDE 0.5 MG: 1 INJECTION, SOLUTION INTRAMUSCULAR; INTRAVENOUS; SUBCUTANEOUS at 17:16

## 2022-04-27 ASSESSMENT — ENCOUNTER SYMPTOMS
RESPIRATORY NEGATIVE: 1
ENDOCRINE NEGATIVE: 1
GASTROINTESTINAL NEGATIVE: 1
EYES NEGATIVE: 1
CARDIOVASCULAR NEGATIVE: 1
CONSTITUTIONAL NEGATIVE: 1
NEUROLOGICAL NEGATIVE: 1
JOINT SWELLING: 1

## 2022-04-27 NOTE — ED PROVIDER NOTES
"  History     Chief Complaint   Patient presents with     Hip Pain     HPI  Bianca Greene is a 89 year old female who is transported by EMS after having fallen and sustained a left hip injury.  Patient does not really able to give much history.  I was not able to obtain history from EMS.  The patient's daughter came to the emergency department and related that the patient lives with her son.  Patient has been \"sleeping a lot\" lately.  Patient apparently fell out of bed this afternoon and injured her left hip.  EMS was then summoned.  The patient's daughter relates that she is not currently on any medications and she is generally in good health.  She is able to ambulate and able to take care of most of her activities of daily living.  Should be noted that the patient was given fentanyl and Versed in route.  She is also apparently very hard of hearing.    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     Priority: Medium        Past Medical History:    Past Medical History:   Diagnosis Date     Abdominal pain, left lower quadrant 5/14/2002     Chest pain, unspecified 6/26/2007     Other screening mammogram 5/22/2002     Venous stasis ulcer (H)        Past Surgical History:    Past Surgical History:   Procedure Laterality Date     EYE SURGERY  2010    Bilateral cataracts     HYSTERECTOMY       left hip replacement  2009    RIGHT hip replacement per pt     TONSILLECTOMY         Family History:    Family History   Problem Relation Age of Onset     Heart Disease Father         heart disease; cause of death     " Dementia Mother 89        cause of death     Osteoporosis Mother      Cancer - colorectal Sister      Cancer Son 54        brain     Cancer Sister 62        leukemia; cause of death     Cerebrovascular Disease Son      Suicide Brother        Social History:  Marital Status:   [5]  Social History     Tobacco Use     Smoking status: Never Smoker     Smokeless tobacco: Never Used   Substance Use Topics     Alcohol use: No     Drug use: No        Medications:    No current outpatient medications on file.        Review of Systems   Constitutional: Negative.    HENT: Negative.    Eyes: Negative.    Respiratory: Negative.    Cardiovascular: Negative.    Gastrointestinal: Negative.    Endocrine: Negative.    Genitourinary: Negative.    Musculoskeletal: Positive for joint swelling.        Please see history of chief complaint   Skin: Negative.    Neurological: Negative.    All other appropriate systems reviewed and found unremarkable    Physical Exam   BP: 165/86  Pulse: 87  Temp: 97.7  F (36.5  C)  Resp: 16  SpO2: 96 %      Physical Exam 89-year-old female who is awake as she is alert.  She is very hard of hearing.  As I commented earlier she was given fentanyl and Versed so she is slightly drowsy but she does appear uncomfortable.  HEENT normocephalic atraumatic extraocular muscles intact pupils equally round react light and oropharynx clear.  Neck is supple and his range of motion is nontender to palpation.  Lungs are clear bilaterally.  Heart maintains regular rate and rhythm.  S1 and S2 sounds are appreciated.  The abdomen is soft no mass no organomegaly rebound patient has tenderness to palpation over the pelvis.  She has tenderness to palpation over the greater trochanter of the left hip.  Extremity exam does reveal the the left lower extremity be foreshortened.  Patient has brisk capillary refill and brisk peripheral pulses in her extremities.  Neurologic exam no focal cranial nerve deficit.  Dermatologic exam  no diffuse skin rashes or lesions are noted    ED Course              ED Course as of 04/27/22 1626   Wed Apr 27, 2022   1519 Discussed the case KYLE Berry, on call ortho at [facility] who very graciously agreed to accept the patient in transfer.  Will arrange transfer by ALS     EKG obtained and interpreted by myself.  It shows a normal sinus rhythm ventricular rate 85 bpm.  Parable 212 ms.  Corrected  ms.  There is background interference in lead V3 otherwise there is no ST segment elevation or depression there is no inappropriate T wave inversion.  There does not appear to be evidence of acute ischemic change.                  Results for orders placed or performed during the hospital encounter of 04/27/22 (from the past 24 hour(s))   CBC with platelets differential    Narrative    The following orders were created for panel order CBC with platelets differential.  Procedure                               Abnormality         Status                     ---------                               -----------         ------                     CBC with platelets and d...[[id number]]  Abnormal            Final result                 Please view results for these tests on the individual orders.   INR   Result Value Ref Range    INR 1.03 0.85 - 1.15   Comprehensive metabolic panel   Result Value Ref Range    Sodium 140 133 - 144 mmol/L    Potassium 3.9 3.4 - 5.3 mmol/L    Chloride 109 94 - 109 mmol/L    Carbon Dioxide (CO2) 24 20 - 32 mmol/L    Anion Gap 7 3 - 14 mmol/L    Urea Nitrogen 23 7 - 30 mg/dL    Creatinine 1.21 (H) 0.52 - 1.04 mg/dL    Calcium 9.3 8.5 - 10.1 mg/dL    Glucose 99 70 - 99 mg/dL    Alkaline Phosphatase 100 40 - 150 U/L    AST 15 0 - 45 U/L    ALT 17 0 - 50 U/L    Protein Total 7.2 6.8 - 8.8 g/dL    Albumin 3.4 3.4 - 5.0 g/dL    Bilirubin Total 0.4 0.2 - 1.3 mg/dL    GFR Estimate 42 (L) >60 mL/min/1.73m2   CBC with platelets and differential   Result Value Ref Range    WBC Count 4.9  4.0 - 11.0 10e3/uL    RBC Count 4.81 3.80 - 5.20 10e6/uL    Hemoglobin 11.2 (L) 11.7 - 15.7 g/dL    Hematocrit 36.6 35.0 - 47.0 %    MCV 76 (L) 78 - 100 fL    MCH 23.3 (L) 26.5 - 33.0 pg    MCHC 30.6 (L) 31.5 - 36.5 g/dL    RDW 18.0 (H) 10.0 - 15.0 %    Platelet Count 290 150 - 450 10e3/uL    % Neutrophils 71 %    % Lymphocytes 19 %    % Monocytes 7 %    % Eosinophils 1 %    % Basophils 1 %    % Immature Granulocytes 1 %    NRBCs per 100 WBC 0 <1 /100    Absolute Neutrophils 3.5 1.6 - 8.3 10e3/uL    Absolute Lymphocytes 0.9 0.8 - 5.3 10e3/uL    Absolute Monocytes 0.3 0.0 - 1.3 10e3/uL    Absolute Eosinophils 0.0 0.0 - 0.7 10e3/uL    Absolute Basophils 0.0 0.0 - 0.2 10e3/uL    Absolute Immature Granulocytes 0.0 <=0.4 10e3/uL    Absolute NRBCs 0.0 10e3/uL   Extra Tube    Narrative    The following orders were created for panel order Extra Tube.  Procedure                               Abnormality         Status                     ---------                               -----------         ------                     Extra Red Top Tube[[id number]]                               Final result               Extra Blood Bank Purple ...[[id number]]                      Final result               Extra Green Top (Lithium...[[id number]]                      Final result                 Please view results for these tests on the individual orders.   Extra Red Top Tube   Result Value Ref Range    Hold Specimen     Extra Blood Bank Purple Top Tube   Result Value Ref Range    Hold Specimen JIC    Extra Green Top (Lithium Heparin) ON ICE   Result Value Ref Range    Hold Specimen     Asymptomatic COVID-19 Virus (Coronavirus) by PCR Nasopharyngeal    Specimen: Nasopharyngeal; Swab   Result Value Ref Range    SARS CoV2 PCR Negative Negative    Narrative    Testing was performed using the Xpert Xpress SARS-CoV-2 Assay on the   Cepheid Gene-Xpert Instrument Systems. Additional information about   this Emergency Use Authorization (EUA)  assay can be found via the Lab   Guide. This test should be ordered for the detection of SARS-CoV-2 in   individuals who meet SARS-CoV-2 clinical and/or epidemiological   criteria. Test performance is unknown in asymptomatic patients. This   test is for in vitro diagnostic use under the FDA EUA for   laboratories certified under CLIA to perform high complexity testing.   This test has not been FDA cleared or approved. A negative result   does not rule out the presence of PCR inhibitors in the specimen or   target RNA in concentration below the limit of detection for the   assay. The possibility of a false negative should be considered if   the patient's recent exposure or clinical presentation suggests   COVID-19. This test was validated by [facility] laboratory. This laboratory is certified under the Clinical Laboratory Improve  ment Amendments (CLIA) as qualified to perform high complexity testing.   XR Chest Port 1 View    Narrative    PROCEDURE: XR CHEST PORT 1 VIEW 4/27/2022 2:47 PM    HISTORY: hip fx    COMPARISONS: 7/2/2020.    TECHNIQUE: Single portable view.    FINDINGS: Heart is probably normal in size allowing for portable  technique. There is some ectasia of the aorta. No confluent infiltrate  or pleural effusion is seen. There is some chronic appearing  interstitial change.    There are old left posterolateral rib fractures.    Multiple stones are seen in the gallbladder.         Impression    IMPRESSION: No definite acute infiltrate.    JAGDISH CELESTIN MD         SYSTEM ID:  [id number]   XR Pelvis w Hip Left G/E 2 Views    Narrative    PROCEDURE: XR PELVIS AND HIP LEFT 2 VIEWS 4/27/2022 2:48 PM    HISTORY: fall onto left hip    COMPARISONS: 10/30/2019.    TECHNIQUE: AP view of the pelvis and AP and lateral views of the left  hip.    FINDINGS: There is an area of sclerosis across the neck of the left  femur, not present on the prior exam and probably due to a slightly  impacted but  not displaced femoral neck fracture. The fracture line  itself is not well seen.    There are degenerative changes in the left hip with lateral spurs and  mild joint space narrowing.    There is a right hip arthroplasty. There are degenerative changes in  the lower lumbar spine.         Impression    IMPRESSION: Probable slightly impacted left femoral neck fracture with  area of sclerosis. Occasionally osteophytes can have a similar  appearance.    JAGDISH CELESTIN MD         SYSTEM ID:  [id number]       Medications   fentaNYL (PF) (SUBLIMAZE) injection 50 mcg (50 mcg Intravenous Given 4/27/22 1402)   HYDROmorphone (PF) (DILAUDID) injection 0.5 mg (0.5 mg Intravenous Given 4/27/22 1511)       Assessments & Plan (with Medical Decision Making)     I have reviewed the nursing notes.    I have reviewed the findings, diagnosis, plan and need for follow up with the patient.  The plan is to transfer the patient to [facility] for admission to the orthopedic service.    New Prescriptions    No medications on file       Final diagnoses:   Hip fracture, left, closed, initial encounter (H)       4/27/2022   [facility]     Fredrick Hardy, DO  04/27/22 1626       Fredrick Hardy, DO  04/27/22 1822

## 2022-04-27 NOTE — ED NOTES
Stat Doc called facility to report that patient will be going straight to ED, number for report- [phone]

## 2022-04-27 NOTE — ED NOTES
[facility] [place]

## 2022-04-28 ENCOUNTER — TRANSFERRED RECORDS (OUTPATIENT)
Dept: HEALTH INFORMATION MANAGEMENT | Facility: CLINIC | Age: 87
End: 2022-04-28

## 2022-05-16 ENCOUNTER — TELEPHONE (OUTPATIENT)
Dept: INTERNAL MEDICINE | Facility: OTHER | Age: 87
End: 2022-05-16

## 2022-05-16 NOTE — TELEPHONE ENCOUNTER
12:43 PM    Reason for Call: 12:47 PM    Reason for Call: OVERBOOK      The patient is requesting an appointment for Mell Burrell for Discharge from rehab facility. Per daughter Selene cannot be discharged until seen by provider and requesting appt for tomorrow 05/17/2022.    Was an appointment offered for this call? No  If yes : Appointment type              Date    Preferred method for responding to this message: Telephone Call  What is your phone number ? [phone]    If we cannot reach you directly, may we leave a detailed response at the number you provided? Yes    Can this message wait until your PCP/provider returns, if unavailable today? Not applicable, aware that provider is out of office until 05/17.     Yessy Tavares

## 2022-05-17 ENCOUNTER — APPOINTMENT (OUTPATIENT)
Dept: GENERAL RADIOLOGY | Facility: HOSPITAL | Age: 87
End: 2022-05-17
Attending: STUDENT IN AN ORGANIZED HEALTH CARE EDUCATION/TRAINING PROGRAM
Payer: MEDICARE

## 2022-05-17 ENCOUNTER — OFFICE VISIT (OUTPATIENT)
Dept: FAMILY MEDICINE | Facility: OTHER | Age: 87
End: 2022-05-17
Attending: NURSE PRACTITIONER
Payer: MEDICARE

## 2022-05-17 ENCOUNTER — HOSPITAL ENCOUNTER (EMERGENCY)
Facility: HOSPITAL | Age: 87
Discharge: HOME | End: 2022-05-17
Attending: STUDENT IN AN ORGANIZED HEALTH CARE EDUCATION/TRAINING PROGRAM | Admitting: STUDENT IN AN ORGANIZED HEALTH CARE EDUCATION/TRAINING PROGRAM
Payer: MEDICARE

## 2022-05-17 ENCOUNTER — APPOINTMENT (OUTPATIENT)
Dept: GENERAL RADIOLOGY | Facility: HOSPITAL | Age: 87
End: 2022-05-17
Attending: PHYSICIAN ASSISTANT
Payer: MEDICARE

## 2022-05-17 ENCOUNTER — APPOINTMENT (OUTPATIENT)
Dept: CT IMAGING | Facility: HOSPITAL | Age: 87
End: 2022-05-17
Attending: PHYSICIAN ASSISTANT
Payer: MEDICARE

## 2022-05-17 VITALS
TEMPERATURE: 97.9 F | RESPIRATION RATE: 18 BRPM | DIASTOLIC BLOOD PRESSURE: 67 MMHG | HEART RATE: 88 BPM | SYSTOLIC BLOOD PRESSURE: 158 MMHG | OXYGEN SATURATION: 96 %

## 2022-05-17 VITALS
RESPIRATION RATE: 18 BRPM | OXYGEN SATURATION: 100 % | SYSTOLIC BLOOD PRESSURE: 124 MMHG | DIASTOLIC BLOOD PRESSURE: 60 MMHG | HEART RATE: 74 BPM | TEMPERATURE: 98 F

## 2022-05-17 DIAGNOSIS — S70.02XA CONTUSION OF LEFT HIP, INITIAL ENCOUNTER: ICD-10-CM

## 2022-05-17 DIAGNOSIS — S72.002S CLOSED LEFT HIP FRACTURE, SEQUELA: Primary | ICD-10-CM

## 2022-05-17 PROCEDURE — 99284 EMERGENCY DEPT VISIT MOD MDM: CPT | Performed by: PHYSICIAN ASSISTANT

## 2022-05-17 PROCEDURE — 73560 X-RAY EXAM OF KNEE 1 OR 2: CPT | Mod: LT

## 2022-05-17 PROCEDURE — 250N000011 HC RX IP 250 OP 636: Performed by: STUDENT IN AN ORGANIZED HEALTH CARE EDUCATION/TRAINING PROGRAM

## 2022-05-17 PROCEDURE — G0463 HOSPITAL OUTPT CLINIC VISIT: HCPCS

## 2022-05-17 PROCEDURE — 250N000013 HC RX MED GY IP 250 OP 250 PS 637: Performed by: PHYSICIAN ASSISTANT

## 2022-05-17 PROCEDURE — G0463 HOSPITAL OUTPT CLINIC VISIT: HCPCS | Mod: 25

## 2022-05-17 PROCEDURE — 96374 THER/PROPH/DIAG INJ IV PUSH: CPT

## 2022-05-17 PROCEDURE — 99285 EMERGENCY DEPT VISIT HI MDM: CPT | Mod: 25,27

## 2022-05-17 PROCEDURE — G1004 CDSM NDSC: HCPCS

## 2022-05-17 PROCEDURE — 73502 X-RAY EXAM HIP UNI 2-3 VIEWS: CPT

## 2022-05-17 PROCEDURE — 99213 OFFICE O/P EST LOW 20 MIN: CPT | Performed by: NURSE PRACTITIONER

## 2022-05-17 PROCEDURE — 72192 CT PELVIS W/O DYE: CPT | Mod: MG

## 2022-05-17 PROCEDURE — 99284 EMERGENCY DEPT VISIT MOD MDM: CPT | Mod: 25

## 2022-05-17 RX ORDER — PREDNISOLONE ACETATE 10 MG/ML
SUSPENSION/ DROPS OPHTHALMIC
COMMUNITY
Start: 2022-05-05 | End: 2022-05-25

## 2022-05-17 RX ORDER — HYDROMORPHONE HYDROCHLORIDE 1 MG/ML
0.5 INJECTION, SOLUTION INTRAMUSCULAR; INTRAVENOUS; SUBCUTANEOUS EVERY 30 MIN PRN
Status: DISCONTINUED | OUTPATIENT
Start: 2022-05-17 | End: 2022-05-17 | Stop reason: HOSPADM

## 2022-05-17 RX ORDER — ACETAMINOPHEN 325 MG/1
975 TABLET ORAL ONCE
Status: COMPLETED | OUTPATIENT
Start: 2022-05-17 | End: 2022-05-17

## 2022-05-17 RX ORDER — ASPIRIN 81 MG/1
81 TABLET, CHEWABLE ORAL
COMMUNITY
Start: 2022-05-04 | End: 2022-05-25

## 2022-05-17 RX ORDER — ACETAMINOPHEN 325 MG/1
650 TABLET ORAL
COMMUNITY
Start: 2022-05-04 | End: 2022-05-25

## 2022-05-17 RX ADMIN — ACETAMINOPHEN 975 MG: 325 TABLET ORAL at 19:30

## 2022-05-17 RX ADMIN — HYDROMORPHONE HYDROCHLORIDE 0.5 MG: 1 INJECTION, SOLUTION INTRAMUSCULAR; INTRAVENOUS; SUBCUTANEOUS at 18:37

## 2022-05-17 ASSESSMENT — ENCOUNTER SYMPTOMS
HEMATOLOGIC/LYMPHATIC NEGATIVE: 1
PSYCHIATRIC NEGATIVE: 1
NEUROLOGICAL NEGATIVE: 1
CARDIOVASCULAR NEGATIVE: 1
ARTHRALGIAS: 1
EYES NEGATIVE: 1
ENDOCRINE NEGATIVE: 1
CONSTITUTIONAL NEGATIVE: 1
GASTROINTESTINAL NEGATIVE: 1
RESPIRATORY NEGATIVE: 1

## 2022-05-17 ASSESSMENT — PAIN SCALES - GENERAL: PAINLEVEL: NO PAIN (0)

## 2022-05-17 NOTE — LETTER
[facility]  [address]  [facility] [place] [address]  Phone: [phone]    May 17, 2022        Bianca CHAN Jc  [address] [place] [address]          To whom it may concern:    RE: Bianca Valenzuela is ok to be at home with her son Jamel. This was her previous living situation prior to rehab at [facility] in [place], [place] post left hip fracture and repair. Discharge from [facility] and continue current medication.     In home PT and OT.     Daughter Selene to assume PCA role.       Sincerely,        Mell Burrell NP

## 2022-05-17 NOTE — PROGRESS NOTES
Assessment & Plan     Bianca is 3 weeks s/p left hip hemiarthroplasty. She was discharged from [facility] to [facility] for therapy. Family has her come home yesterday. They need a note stating is ok for her to be at home and to continue PT/OT home therapy. Also, home care aid. DME for walker and wheelchair. Also, gloves for her family to help with lisa cares as she isn't able to wipe herself yet. She lives with her son. Her daughter lives close by and also helps out with cares.      Bianca is doing well. She is walking with a walker. She has minimal pain.     (S72.002S) Closed left hip fracture, sequela  (primary encounter diagnosis)  Comment: note given for nursing home release and continue PT/OT in home. Continue same medications. Home care PCA. DME's ordered for in home care   Plan: Physical Therapy Referral, Miscellaneous Order         for DME - ONLY FOR DME, Walker Order for DME -         ONLY FOR DME, Wheelchair Order for DME - ONLY         FOR DME, Occupational Therapy Referral            See Patient Instructions    Return if symptoms worsen or fail to improve.    Mell Burrell NP  [facility]   Bianca is a 89 year old who presents for the following health issues  accompanied by her daughter, Selene.     HPI     Concern - Face to Face   Onset: 4/27/2022  Description:  PT/OT and skilled nursing orders. Order for w/c and walker. Need gloves to help with wiping post hip fracture   Intensity: Mild, moderate   Progression of Symptoms:  improving  Accompanying Signs & Symptoms: ER visit for broken hip 4/27/22, discharged to [facility] after hip surgery with Dr.Larson JOSE CARLOS Ledesma for hip repair.  Previous history of similar problem: None  Precipitating factors:        Worsened by:   Alleviating factors:        Improved by:   Therapies tried and outcome: PT & OT at [facility]       Review of Systems   Constitutional, HEENT, cardiovascular,  pulmonary, GI, , musculoskeletal, neuro, skin, endocrine and psych systems are negative, except as otherwise noted.      Objective    /60   Pulse 74   Temp 98  F (36.7  C)   Resp 18   SpO2 100%   There is no height or weight on file to calculate BMI.  Physical Exam   GENERAL: healthy, alert and no distress. +Pala wears bilateral hearing aids   NECK: no adenopathy, no asymmetry, masses, or scars and thyroid normal to palpation  RESP: lungs clear to auscultation - no rales, rhonchi or wheezes  CV: regular rate and rhythm, normal S1 S2, no S3 or S4, no murmur, click or rub, no peripheral edema and peripheral pulses strong  MS: no gross musculoskeletal defects noted, no edema  SKIN: no suspicious lesions or rashes  NEURO: Normal strength and tone, mentation intact and speech normal  PSYCH: mentation appears normal, affect normal/bright

## 2022-05-17 NOTE — ED NOTES
Patient [facility] DTR reports patient was in kitchen and fell. Fall witnessed- patient did not hit head or lose consciousness. Patient is now unable to bare weight on left hip. Patient had recent SX on left hip d/t FX on 4/28/22. Patient able to move leg. CMS intact.

## 2022-05-17 NOTE — ED TRIAGE NOTES
"Pt had a fall today while she was in the kitchen. Per the son, her leg \"just gave out\" pt has 10/10 left thigh/knee/hip pain. Did not hit head. No blood thinners  Pt had a LT hip surgery 4/27 at [facility]     Triage Assessment     Row Name 05/17/22 7226       Triage Assessment (Adult)    Airway WDL WDL       Respiratory WDL    Respiratory WDL WDL       Skin Circulation/Temperature WDL    Skin Circulation/Temperature WDL WDL       Cardiac WDL    Cardiac WDL WDL       Peripheral/Neurovascular WDL    Peripheral Neurovascular WDL WDL       Cognitive/Neuro/Behavioral WDL    Cognitive/Neuro/Behavioral WDL WDL              "

## 2022-05-17 NOTE — NURSING NOTE
Chief Complaint   Patient presents with     Clinic Care Coordination - Face To Face       Initial /60   Pulse 74   Temp 98  F (36.7  C)   Resp 18   SpO2 100%  Estimated body mass index is 28.71 kg/m  as calculated from the following:    Height as of 10/30/19: 1.524 m (5').    Weight as of 7/20/21: 66.7 kg (147 lb).  Medication Reconciliation: tito Contreras

## 2022-05-18 NOTE — DISCHARGE INSTRUCTIONS
Tylenol every 4-6 hours to help with pain  Ice and heat alternating  Mild exercises  Should use her walker at all times with ambulating  Follow-up with orthopedics if symptoms worsen or change  Charges stable condition

## 2022-05-18 NOTE — ED PROVIDER NOTES
History     Chief Complaint   Patient presents with     Fall     HPI  Bianca Greene is a 89 year old female who left hip and femur pain after fall and the kitchen today.  Patient had hip surgery approximately 3 weeks ago and she was doing relatively well she was able to ambulate well with a walker.  Today at her son's home she went to the kitchen without the walker and as she turned around she fell landing on her left leg.  She was able to get up with assistance with her son and a walker and she was doing fine she sat in the chair for approximately 15 to 20 minutes and started having significant pain in the lower femur area.  Patient now cannot bear any weight and was in severe pain.  Family was able to get her in the car and bring her to the ER for evaluation.  She had not started physical therapy yet in the home but she did have some physical therapy prior to discharge.  She has not really been taking anything for pain she has been doing relatively well.  There is no significant signs of deformity but even palpation to the thigh and hip causes her severe pain.  Patient denies any loss of consciousness she did not hit her head.  She denies any neck pain the fall was witnessed by her son.  Past medical history current medications allergies have been reviewed.    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     Priority: Medium        Past Medical History:    Past Medical History:   Diagnosis Date     Abdominal pain, left lower quadrant 5/14/2002     Chest pain, unspecified 6/26/2007     Other screening  mammogram 5/22/2002     Venous stasis ulcer (H)        Past Surgical History:    Past Surgical History:   Procedure Laterality Date     EYE SURGERY  2010    Bilateral cataracts     HYSTERECTOMY       left hip replacement  2009    RIGHT hip replacement per pt     TONSILLECTOMY         Family History:    Family History   Problem Relation Age of Onset     Heart Disease Father         heart disease; cause of death     Dementia Mother 89        cause of death     Osteoporosis Mother      Cancer - colorectal Sister      Cancer Son 54        brain     Cancer Sister 62        leukemia; cause of death     Cerebrovascular Disease Son      Suicide Brother        Social History:  Marital Status:   [5]  Social History     Tobacco Use     Smoking status: Never Smoker     Smokeless tobacco: Never Used   Substance Use Topics     Alcohol use: No     Drug use: No        Medications:    acetaminophen (TYLENOL) 325 MG tablet  aspirin (ASA) 81 MG chewable tablet  prednisoLONE acetate (PRED FORTE) 1 % ophthalmic suspension          Review of Systems   Constitutional: Negative.    HENT: Positive for hearing loss.    Eyes: Negative.    Respiratory: Negative.    Cardiovascular: Negative.    Gastrointestinal: Negative.    Endocrine: Negative.    Genitourinary: Negative.    Musculoskeletal: Positive for arthralgias.        Left hip and femur pain   Skin: Negative.    Neurological: Negative.    Hematological: Negative.    Psychiatric/Behavioral: Negative.        Physical Exam   BP: 140/75  Pulse: 102  Temp: 98  F (36.7  C)  Resp: 18  SpO2: 98 %      Physical Exam  Vitals and nursing note reviewed.   Constitutional:       General: She is not in acute distress.     Appearance: Normal appearance. She is not ill-appearing.   HENT:      Head: Normocephalic and atraumatic.      Right Ear: Tympanic membrane, ear canal and external ear normal.      Left Ear: Tympanic membrane, ear canal and external ear normal.   Eyes:      Extraocular  Movements: Extraocular movements intact.      Conjunctiva/sclera: Conjunctivae normal.      Pupils: Pupils are equal, round, and reactive to light.   Cardiovascular:      Rate and Rhythm: Normal rate and regular rhythm.      Pulses: Normal pulses.      Heart sounds: Normal heart sounds.   Pulmonary:      Effort: Pulmonary effort is normal.      Breath sounds: Normal breath sounds.   Abdominal:      General: Abdomen is flat. There is no distension.      Tenderness: There is no abdominal tenderness. There is no right CVA tenderness, left CVA tenderness, guarding or rebound.   Musculoskeletal:         General: Tenderness (severe pain over the midfemur.  scar is healing well no signs of bruising redness or abrasions.  Range of motion causes significant pain.) present. No deformity.      Cervical back: Normal range of motion and neck supple.   Skin:     General: Skin is warm.      Capillary Refill: Capillary refill takes less than 2 seconds.   Neurological:      General: No focal deficit present.      Mental Status: She is alert and oriented to person, place, and time. Mental status is at baseline.   Psychiatric:         Mood and Affect: Mood normal.         ED Course      89-year-old female presents to the ER with her family by private car after she fell in the home of her sons today.  She landed on her left hip and she just had total hip replacement about 3 to 4 weeks ago.  Patient was able to get up and ambulate right after the injury however within about 15 to 20 minutes she started to have severe pain in the femur.  Patient was not able to bear weight.  But she was able to get here by private car.  On arrival she has some mild distress secondary to her left femur pain patient was given Dilaudid 0.5 mg and x-rays of the hip and femur ultimately were negative however the radiologist then felt that maybe her left pubic rami had some type of a small subtle nondisplaced fracture.  Patient was sent to the CT scan for  pelvis and femur and ultimately there was no signs of any fracture of the pubic rami it was more of enthesophyte.  She has also been given Tylenol 975 mg and icing while she was waiting for CT.  When she got back from CT she was able to sit up and stand and bear weight with some assistance.  At this time I think she is can go home with probably just a moderate hip or femur contusion.  The area shows no signs of redness swelling the incision is healing well.  Discussed with daughter on home care and recommend giving 2 Tylenol every 4-6 hours consistently to help with the pain consistently doing icing and heat and some range of motion exercises.  If symptoms worsen or change patient should follow-up with orthopedics for further evaluation or treatment or come back to the ER.  Patient and daughter understood the above assessment treatment and plan discharged in stable condition.              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 Prosthetic components in  expected position. No evidence of loosening. There is subtle  irregularity of the medial left inferior pubic ramus, possibly an  essentially nondisplaced fracture.    Soft tissues appear normal.      Impression    Impression:  Subtle irregularity of the medial left inferior pubic ramus, possibly  an essentially nondisplaced fracture. CT could be considered if there  is ongoing clinical concern.     Bilateral hip arthroplasties are in the expected position without  evidence of loosening.    FRANK FUNEZ MD         SYSTEM ID:  [id number]   XR Knee Left 1/2 Views    Narrative    Exam: XR KNEE LT 1/2 VW    Technique: Left knee, 2 view    Comparison:  None.    Exam reason: pain    Findings:  There is diffuse osteopenia. No acute fracture or dislocation. Joint  spaces are fairly well-maintained.    There is a possible small knee joint effusion.      Impression    Impression:  No acute fracture or dislocation.    FRANK FUNEZ MD         SYSTEM ID:  [id number]   CT Femur Thigh Left w/o Contrast    Narrative    Exam: CT PELVIS BONE WO CONTRAST, CT FEMUR THIGH LEFT WITHOUT CONTRAST    Exam reason: Pelvic fracture,Fracture, femur    Technique: Using helical CT technique, axial images are obtained  through the pelvis and left femur. Coronal and sagittal reformats were  performed. No intravenous contrast was utilized.    Comparison: 5/17/2022, 4/27/2022    Findings:    There is diffuse osteopenia.    No acute fracture or dislocation. The subtle cortical irregularity of  the medial left inferior pubic ramus is compatible with an  enthesophyte and is not associated with a fracture.    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 Prosthetic components in  expected position. No evidence of loosening.    There are degenerative changes of the pubic symphysis and sacroiliac  joints.    Visualized bowel and the bladder appears normal. No free fluid.    There were postsurgical changes of the soft tissues lateral to the  left hip      Impression    Impression:    No acute fracture or dislocation.    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     FRANK FUNEZ MD         SYSTEM ID:  [id number]   CT Pelvis Bone wo Contrast    Narrative    Exam: CT PELVIS BONE WO CONTRAST, CT FEMUR THIGH LEFT WITHOUT CONTRAST    Exam reason: Pelvic fracture,Fracture, femur    Technique: Using helical CT technique, axial images are obtained  through the pelvis and left femur. Coronal and sagittal reformats were  performed. No intravenous contrast was utilized.    Comparison: 5/17/2022, 4/27/2022    Findings:    There is diffuse osteopenia.    No  acute fracture or dislocation. The subtle cortical irregularity of  the medial left inferior pubic ramus is compatible with an  enthesophyte and is not associated with a fracture.    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 Prosthetic components in  expected position. No evidence of loosening.    There are degenerative changes of the pubic symphysis and sacroiliac  joints.    Visualized bowel and the bladder appears normal. No free fluid.    There were postsurgical changes of the soft tissues lateral to the  left hip      Impression    Impression:    No acute fracture or dislocation.    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     FRANK FUNEZ MD         SYSTEM ID:  [id number]       Medications   HYDROmorphone (PF) (DILAUDID) injection 0.5 mg (0.5 mg Intravenous Given 5/17/22 1837)   acetaminophen (TYLENOL) tablet 975 mg (975 mg Oral Given 5/17/22 1930)       Assessments & Plan (with Medical Decision Making)     I have reviewed the nursing notes.    I have reviewed the findings, diagnosis, plan and need for follow up with the patient.      New Prescriptions    No medications on file       Final diagnoses:   Contusion of left hip, initial encounter       5/17/2022   [facility]     Kimberly Arrington PA-C  05/17/22 2054

## 2022-05-18 NOTE — ED NOTES
Face to face report given with opportunity to observe patient.    Report received from nIes Kulkarni ready for CT  Awaiting further analgesia  Pt unable to lift L leg, slight;y shorter than R leg at current    Lucina Cruz RN   5/17/2022  7:10 PM

## 2022-05-18 NOTE — ED NOTES
Face to face report given with opportunity to observe patient.    Report given to GIANNA Verdugo RN   5/17/2022  7:11 PM

## 2022-05-24 ENCOUNTER — TELEPHONE (OUTPATIENT)
Dept: INTERNAL MEDICINE | Facility: OTHER | Age: 87
End: 2022-05-24

## 2022-05-24 NOTE — TELEPHONE ENCOUNTER
Please call [facility] and let her know if you will follow this patient if they accept her for home care.      May Telles on 5/24/2022 at 9:12 AM

## 2022-05-25 ENCOUNTER — TELEPHONE (OUTPATIENT)
Dept: INTERNAL MEDICINE | Facility: OTHER | Age: 87
End: 2022-05-25

## 2022-05-25 NOTE — CONFIDENTIAL NOTE
Orders approved for home care services of nursing, PT, and OT.    SANDRA Lyons, AGNP-C  Internal Medicine  05/25/2022 5:43 PM

## 2022-05-25 NOTE — TELEPHONE ENCOUNTER
Jacquelin with [facility] called to get verbal orders for home care admission for PT, OT and nursing.    Cait Louis on 5/25/2022 at 9:37 AM

## 2022-05-27 ENCOUNTER — TELEPHONE (OUTPATIENT)
Dept: FAMILY MEDICINE | Facility: OTHER | Age: 87
End: 2022-05-27
Payer: MEDICARE

## 2022-05-27 NOTE — TELEPHONE ENCOUNTER
After verifying last name and  [facility] notifed of the below information.   Xiomara Cee LPN on 2022 at 9:14 AM

## 2022-05-27 NOTE — TELEPHONE ENCOUNTER
Call from FREDDY Cervantes with Replaced by [facility] requesting VO for PT 1 x per week x 2 weeks, then up to 2 x per week x 2 weeks.     Helen can be reached at [phone].

## 2022-06-03 DIAGNOSIS — Z53.9 PERSONS ENCOUNTERING HEALTH SERVICES FOR SPECIFIC PROCEDURES, NOT CARRIED OUT: Primary | ICD-10-CM

## 2022-06-03 PROCEDURE — G0180 MD CERTIFICATION HHA PATIENT: HCPCS | Performed by: NURSE PRACTITIONER

## 2022-06-16 ENCOUNTER — TELEPHONE (OUTPATIENT)
Dept: FAMILY MEDICINE | Facility: OTHER | Age: 87
End: 2022-06-16
Payer: MEDICARE

## 2022-06-16 NOTE — TELEPHONE ENCOUNTER
ROSEMARIE Izaguirre with [facility] calling reporting that patient has two 0.5cmx0.5cm on superior, right coccyx buttock region.     A&D being applied with toileting. Applying Optifoam PRN, does not stick well.   Attempting positioning as tolerating. Patient recently had left hip fracture causing patient discomfort for repositioning as tolerated. Patient using cushion for comfort/positioning. Zina reports patient does not like to take or tolerate medication.    Family going to try OTC lidocaine patches.    Any new orders to assistance? Thicker barrier cream?       Also reporting fall 6/9/22 - per family report patient got up out of recliner independently and fell on her right side and fell into couch furniture, denies hitting head. Currently presenting with bruising to right hip. ROM in right left and hip is WNL per PT and bruise improving. Leg pain in left side only.     Family stating to ROSEMARIE Izaguirre with [facility] that patient was/is supposed to have two week follow up with XR (not seeing records) family has been unable to transfer patient to clinic for appointments or imaging due to patients pain from left hip fracture 4/27/22      Patient non-verbal    Please advise       If needed return call to  ROSEMARIE Izaguirre - [phone]  Selene, daughter  Or Son

## 2022-06-21 ENCOUNTER — MEDICAL CORRESPONDENCE (OUTPATIENT)
Dept: HEALTH INFORMATION MANAGEMENT | Facility: HOSPITAL | Age: 87
End: 2022-06-21

## 2022-06-22 ENCOUNTER — OFFICE VISIT (OUTPATIENT)
Dept: FAMILY MEDICINE | Facility: OTHER | Age: 87
End: 2022-06-22
Attending: NURSE PRACTITIONER
Payer: MEDICARE

## 2022-06-22 VITALS
HEART RATE: 102 BPM | DIASTOLIC BLOOD PRESSURE: 62 MMHG | SYSTOLIC BLOOD PRESSURE: 110 MMHG | TEMPERATURE: 98 F | OXYGEN SATURATION: 97 % | RESPIRATION RATE: 18 BRPM

## 2022-06-22 DIAGNOSIS — L89.151 PRESSURE ULCER OF COCCYGEAL REGION, STAGE 1: Primary | ICD-10-CM

## 2022-06-22 PROCEDURE — G0463 HOSPITAL OUTPT CLINIC VISIT: HCPCS | Performed by: NURSE PRACTITIONER

## 2022-06-22 PROCEDURE — 99214 OFFICE O/P EST MOD 30 MIN: CPT | Performed by: NURSE PRACTITIONER

## 2022-06-22 ASSESSMENT — PAIN SCALES - GENERAL: PAINLEVEL: NO PAIN (0)

## 2022-06-22 NOTE — NURSING NOTE
Chief Complaint   Patient presents with     RECHECK     Pain       Initial /62   Pulse 102   Temp 98  F (36.7  C)   Resp 18   SpO2 97%  Estimated body mass index is 28.71 kg/m  as calculated from the following:    Height as of 10/30/19: 1.524 m (5').    Weight as of 7/20/21: 66.7 kg (147 lb).  Medication Reconciliation: tito Contreras

## 2022-06-22 NOTE — PROGRESS NOTES
Assessment & Plan     Right buttocks pressure wound X2 cleaned with normal saline and skin dried well. Tegaderm applied. Supportive home care discussed on reducing risk of pressure ulcer worsening. Increase protein in diet. Add Zinc supplement daily for wound healing.     (L89.151) Pressure ulcer of coccygeal region, stage 1  (primary encounter diagnosis)  Comment: change Tegaderm every 3 days. Follow up if not improving. Donut cushion to relieve coccyx pressure  Plan: HC FOAM DRESSING W/ ADH BORDER <= 16 SQ IN         (MEPILEX 4 X 4), EA, Pressure Relief Cushion         Order for DME - ONLY FOR DME, Miscellaneous         Order for DME - ONLY FOR DME             See Patient Instructions    Return if symptoms worsen or fail to improve.    Mell Burrell NP  [facility]    Carolina Valenzuela is a 89 year old accompanied by her daughter., presenting for the following health issues:  RECHECK and Pain      HPI     Concern - Face-to-face  Onset: 4/27/22    Description:   Patient resides at home, in need of adaptive cushion for coccyx pressure relief     Intensity: mild, moderate    Progression of Symptoms:  constant    Accompanying Signs & Symptoms:  Reddened area on buttocks     Previous history of similar problem:   Left hip fracture recently. No previous history of a pressure ulcer    Precipitating factors:   Worsened by: sitting    Alleviating factors:  Improved by: resting    Therapies Tried and outcome: bandages. Trying to alleviate pressure        Concern - Pressure sore/XR  Onset: 4/27/22  Description: Closed fracture, left hip  Intensity: mild  Progression of Symptoms:  improving  Accompanying Signs & Symptoms: Pressure sore r/t positioning   Previous history of similar problem: None  Precipitating factors:        Worsened by: Repositioning causing hip pain  Alleviating factors:        Improved by: Repositioning, ointment   Therapies tried and outcome: Gluteal/coccyx cushion,  barrier ointment. Lidocaine cream. Optifoam unable to stick     Her daughter Selene tells me that Bianca recently fell on her right hip, but has been able to bear weight. Her orthopedic from [place] wanted to have Bianca get XRAYs in [place] of her hips. Selene voices frustration as she feels her mother doesn't need XRAYs and especially to have to drive her mother to [place]. She was told by orthopedic surgeon to have her mother come to [place] by ambulance for XRAY's and evaluation. Selene doesn't feel this is necessary as he mother is doing ok at home. Selene wishes for no XRAY's today. Bianca is able to bear weight on her feet.     Review of Systems   Constitutional, HEENT, cardiovascular, pulmonary, gi and gu systems are negative, except as otherwise noted.      Objective    /62   Pulse 102   Temp 98  F (36.7  C)   Resp 18   SpO2 97%   There is no height or weight on file to calculate BMI.  Physical Exam   GENERAL: healthy, alert and no distress  RESP: lungs clear to auscultation - no rales, rhonchi or wheezes  CV: regular rate and rhythm, normal S1 S2, no S3 or S4, no murmur, click or rub, no peripheral edema and peripheral pulses strong  MS: no gross musculoskeletal defects noted, no edema  SKIN: no suspicious rashes. +0.5 cm erythema area to right buttocks near coccyx X2. Skin is blanchable. White center. Non boggy and non tunneling in center. No drainage     NEURO: Normal strength and tone, mentation intact and speech normal  PSYCH: mentation appears normal, affect normal/bright

## 2022-08-09 ENCOUNTER — MEDICAL CORRESPONDENCE (OUTPATIENT)
Dept: HEALTH INFORMATION MANAGEMENT | Facility: HOSPITAL | Age: 87
End: 2022-08-09

## 2023-01-01 ENCOUNTER — APPOINTMENT (OUTPATIENT)
Dept: GENERAL RADIOLOGY | Facility: HOSPITAL | Age: 88
End: 2023-01-01
Attending: STUDENT IN AN ORGANIZED HEALTH CARE EDUCATION/TRAINING PROGRAM
Payer: MEDICARE

## 2023-01-01 ENCOUNTER — APPOINTMENT (OUTPATIENT)
Dept: CT IMAGING | Facility: HOSPITAL | Age: 88
End: 2023-01-01
Attending: PHYSICIAN ASSISTANT
Payer: MEDICARE

## 2023-01-01 ENCOUNTER — OFFICE VISIT (OUTPATIENT)
Dept: FAMILY MEDICINE | Facility: OTHER | Age: 88
End: 2023-01-01
Attending: NURSE PRACTITIONER
Payer: MEDICARE

## 2023-01-01 ENCOUNTER — DOCUMENTATION ONLY (OUTPATIENT)
Dept: CARE COORDINATION | Facility: CLINIC | Age: 88
End: 2023-01-01

## 2023-01-01 ENCOUNTER — TELEPHONE (OUTPATIENT)
Dept: FAMILY MEDICINE | Facility: OTHER | Age: 88
End: 2023-01-01

## 2023-01-01 ENCOUNTER — OFFICE VISIT (OUTPATIENT)
Dept: PODIATRY | Facility: OTHER | Age: 88
End: 2023-01-01
Attending: PODIATRIST
Payer: MEDICARE

## 2023-01-01 ENCOUNTER — MEDICAL CORRESPONDENCE (OUTPATIENT)
Dept: HEALTH INFORMATION MANAGEMENT | Facility: HOSPITAL | Age: 88
End: 2023-01-01

## 2023-01-01 ENCOUNTER — HEALTH MAINTENANCE LETTER (OUTPATIENT)
Age: 88
End: 2023-01-01

## 2023-01-01 ENCOUNTER — OFFICE VISIT (OUTPATIENT)
Dept: FAMILY MEDICINE | Facility: OTHER | Age: 88
End: 2023-01-01
Attending: STUDENT IN AN ORGANIZED HEALTH CARE EDUCATION/TRAINING PROGRAM
Payer: MEDICARE

## 2023-01-01 ENCOUNTER — NURSE TRIAGE (OUTPATIENT)
Dept: FAMILY MEDICINE | Facility: OTHER | Age: 88
End: 2023-01-01

## 2023-01-01 ENCOUNTER — VIRTUAL VISIT (OUTPATIENT)
Dept: FAMILY MEDICINE | Facility: OTHER | Age: 88
End: 2023-01-01
Payer: MEDICARE

## 2023-01-01 ENCOUNTER — APPOINTMENT (OUTPATIENT)
Dept: GENERAL RADIOLOGY | Facility: HOSPITAL | Age: 88
End: 2023-01-01
Attending: PHYSICIAN ASSISTANT
Payer: MEDICARE

## 2023-01-01 ENCOUNTER — MEDICAL CORRESPONDENCE (OUTPATIENT)
Dept: HEALTH INFORMATION MANAGEMENT | Facility: CLINIC | Age: 88
End: 2023-01-01

## 2023-01-01 ENCOUNTER — HOSPITAL ENCOUNTER (EMERGENCY)
Facility: HOSPITAL | Age: 88
Discharge: HOME | End: 2023-04-05
Attending: PHYSICIAN ASSISTANT | Admitting: PHYSICIAN ASSISTANT
Payer: MEDICARE

## 2023-01-01 ENCOUNTER — HOSPITAL ENCOUNTER (OUTPATIENT)
Dept: ULTRASOUND IMAGING | Facility: HOSPITAL | Age: 88
Discharge: HOME | End: 2023-05-30
Attending: ORTHOPAEDIC SURGERY | Admitting: ORTHOPAEDIC SURGERY
Payer: MEDICARE

## 2023-01-01 ENCOUNTER — HOSPITAL ENCOUNTER (EMERGENCY)
Facility: HOSPITAL | Age: 88
Discharge: HOME | End: 2023-05-18
Attending: STUDENT IN AN ORGANIZED HEALTH CARE EDUCATION/TRAINING PROGRAM | Admitting: STUDENT IN AN ORGANIZED HEALTH CARE EDUCATION/TRAINING PROGRAM
Payer: MEDICARE

## 2023-01-01 VITALS
TEMPERATURE: 96.7 F | SYSTOLIC BLOOD PRESSURE: 139 MMHG | RESPIRATION RATE: 22 BRPM | DIASTOLIC BLOOD PRESSURE: 66 MMHG | OXYGEN SATURATION: 96 % | HEART RATE: 96 BPM

## 2023-01-01 VITALS — TEMPERATURE: 96.9 F | HEART RATE: 92 BPM | OXYGEN SATURATION: 92 %

## 2023-01-01 VITALS
OXYGEN SATURATION: 96 % | DIASTOLIC BLOOD PRESSURE: 88 MMHG | SYSTOLIC BLOOD PRESSURE: 146 MMHG | TEMPERATURE: 96.9 F | HEART RATE: 100 BPM | RESPIRATION RATE: 24 BRPM

## 2023-01-01 VITALS
SYSTOLIC BLOOD PRESSURE: 132 MMHG | TEMPERATURE: 97 F | WEIGHT: 132 LBS | HEART RATE: 102 BPM | OXYGEN SATURATION: 98 % | DIASTOLIC BLOOD PRESSURE: 70 MMHG | BODY MASS INDEX: 25.78 KG/M2

## 2023-01-01 VITALS
SYSTOLIC BLOOD PRESSURE: 140 MMHG | OXYGEN SATURATION: 97 % | HEART RATE: 95 BPM | DIASTOLIC BLOOD PRESSURE: 82 MMHG | TEMPERATURE: 96.7 F

## 2023-01-01 VITALS
OXYGEN SATURATION: 96 % | RESPIRATION RATE: 18 BRPM | DIASTOLIC BLOOD PRESSURE: 105 MMHG | TEMPERATURE: 97.1 F | HEART RATE: 77 BPM | SYSTOLIC BLOOD PRESSURE: 195 MMHG

## 2023-01-01 VITALS
TEMPERATURE: 97 F | OXYGEN SATURATION: 97 % | DIASTOLIC BLOOD PRESSURE: 54 MMHG | RESPIRATION RATE: 18 BRPM | SYSTOLIC BLOOD PRESSURE: 132 MMHG | HEART RATE: 90 BPM

## 2023-01-01 DIAGNOSIS — M79.604 PAIN OF RIGHT LOWER EXTREMITY: Primary | ICD-10-CM

## 2023-01-01 DIAGNOSIS — R29.6 FALLS FREQUENTLY: Primary | ICD-10-CM

## 2023-01-01 DIAGNOSIS — L60.8 TOENAIL DEFORMITY: ICD-10-CM

## 2023-01-01 DIAGNOSIS — Z53.9 PERSONS ENCOUNTERING HEALTH SERVICES FOR SPECIFIC PROCEDURES, NOT CARRIED OUT: Primary | ICD-10-CM

## 2023-01-01 DIAGNOSIS — S01.01XA SCALP LACERATION, INITIAL ENCOUNTER: ICD-10-CM

## 2023-01-01 DIAGNOSIS — M79.661 RIGHT CALF PAIN: ICD-10-CM

## 2023-01-01 DIAGNOSIS — T85.848D PAIN FROM IMPLANTED HARDWARE, SUBSEQUENT ENCOUNTER: Primary | ICD-10-CM
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
## 2023-01-01 DIAGNOSIS — T85.848A PAIN FROM IMPLANTED HARDWARE, INITIAL ENCOUNTER: ICD-10-CM

## 2023-01-01 DIAGNOSIS — L60.3 ONYCHODYSTROPHY: Primary | ICD-10-CM

## 2023-01-01 DIAGNOSIS — R26.89 DECREASED MOBILITY: ICD-10-CM

## 2023-01-01 DIAGNOSIS — T85.848D PAIN FROM IMPLANTED HARDWARE, SUBSEQUENT ENCOUNTER: ICD-10-CM

## 2023-01-01 DIAGNOSIS — R60.9 SWELLING: ICD-10-CM

## 2023-01-01 DIAGNOSIS — S09.90XA HEAD INJURY, INITIAL ENCOUNTER: ICD-10-CM

## 2023-01-01 DIAGNOSIS — M79.604 PAIN OF RIGHT LOWER EXTREMITY: ICD-10-CM

## 2023-01-01 DIAGNOSIS — S60.221A CONTUSION OF RIGHT HAND, INITIAL ENCOUNTER: ICD-10-CM

## 2023-01-01 DIAGNOSIS — R29.6 FALLS FREQUENTLY: ICD-10-CM

## 2023-01-01 DIAGNOSIS — R52 TENDERNESS: ICD-10-CM

## 2023-01-01 DIAGNOSIS — Z48.02 VISIT FOR SUTURE REMOVAL: Primary | ICD-10-CM

## 2023-01-01 DIAGNOSIS — L85.3 XEROSIS OF SKIN: ICD-10-CM

## 2023-01-01 LAB
ALBUMIN UR-MCNC: 10 MG/DL
APPEARANCE UR: ABNORMAL
BACTERIA #/AREA URNS HPF: ABNORMAL /HPF
BILIRUB UR QL STRIP: NEGATIVE
COLOR UR AUTO: YELLOW
GLUCOSE UR STRIP-MCNC: NEGATIVE MG/DL
HGB UR QL STRIP: NEGATIVE
KETONES UR STRIP-MCNC: NEGATIVE MG/DL
LEUKOCYTE ESTERASE UR QL STRIP: NEGATIVE
NITRATE UR QL: NEGATIVE
PH UR STRIP: 5.5 [PH] (ref 4.7–8)
RBC URINE: 4 /HPF
SP GR UR STRIP: 1.02 (ref 1–1.03)
SQUAMOUS EPITHELIAL: 1 /HPF
UROBILINOGEN UR STRIP-MCNC: 2 MG/DL
WBC URINE: 1 /HPF

## 2023-01-01 PROCEDURE — 73552 X-RAY EXAM OF FEMUR 2/>: CPT | Mod: RT

## 2023-01-01 PROCEDURE — 99213 OFFICE O/P EST LOW 20 MIN: CPT | Mod: 95 | Performed by: NURSE PRACTITIONER

## 2023-01-01 PROCEDURE — 99285 EMERGENCY DEPT VISIT HI MDM: CPT | Mod: 25

## 2023-01-01 PROCEDURE — G0463 HOSPITAL OUTPT CLINIC VISIT: HCPCS

## 2023-01-01 PROCEDURE — 11721 DEBRIDE NAIL 6 OR MORE: CPT | Mod: XU | Performed by: PODIATRIST

## 2023-01-01 PROCEDURE — 99214 OFFICE O/P EST MOD 30 MIN: CPT | Performed by: STUDENT IN AN ORGANIZED HEALTH CARE EDUCATION/TRAINING PROGRAM

## 2023-01-01 PROCEDURE — G0463 HOSPITAL OUTPT CLINIC VISIT: HCPCS | Mod: 25

## 2023-01-01 PROCEDURE — 99213 OFFICE O/P EST LOW 20 MIN: CPT | Performed by: NURSE PRACTITIONER

## 2023-01-01 PROCEDURE — G0179 MD RECERTIFICATION HHA PT: HCPCS | Performed by: NURSE PRACTITIONER

## 2023-01-01 PROCEDURE — 72170 X-RAY EXAM OF PELVIS: CPT

## 2023-01-01 PROCEDURE — 72125 CT NECK SPINE W/O DYE: CPT | Mod: MA

## 2023-01-01 PROCEDURE — 12002 RPR S/N/AX/GEN/TRNK2.6-7.5CM: CPT

## 2023-01-01 PROCEDURE — 97161 PT EVAL LOW COMPLEX 20 MIN: CPT | Mod: GP

## 2023-01-01 PROCEDURE — 12002 RPR S/N/AX/GEN/TRNK2.6-7.5CM: CPT | Performed by: PHYSICIAN ASSISTANT

## 2023-01-01 PROCEDURE — 70450 CT HEAD/BRAIN W/O DYE: CPT | Mod: MA

## 2023-01-01 PROCEDURE — 99284 EMERGENCY DEPT VISIT MOD MDM: CPT | Performed by: STUDENT IN AN ORGANIZED HEALTH CARE EDUCATION/TRAINING PROGRAM

## 2023-01-01 PROCEDURE — 99285 EMERGENCY DEPT VISIT HI MDM: CPT

## 2023-01-01 PROCEDURE — 73610 X-RAY EXAM OF ANKLE: CPT | Mod: RT

## 2023-01-01 PROCEDURE — 99284 EMERGENCY DEPT VISIT MOD MDM: CPT | Mod: 25 | Performed by: EMERGENCY MEDICINE

## 2023-01-01 PROCEDURE — 11721 DEBRIDE NAIL 6 OR MORE: CPT | Mod: GZ | Performed by: PODIATRIST

## 2023-01-01 PROCEDURE — 73130 X-RAY EXAM OF HAND: CPT | Mod: RT

## 2023-01-01 PROCEDURE — 250N000013 HC RX MED GY IP 250 OP 250 PS 637: Performed by: STUDENT IN AN ORGANIZED HEALTH CARE EDUCATION/TRAINING PROGRAM

## 2023-01-01 PROCEDURE — 93971 EXTREMITY STUDY: CPT | Mod: RT

## 2023-01-01 PROCEDURE — G0180 MD CERTIFICATION HHA PATIENT: HCPCS | Performed by: NURSE PRACTITIONER

## 2023-01-01 PROCEDURE — 11055 PARING/CUTG B9 HYPRKER LES 1: CPT | Performed by: PODIATRIST

## 2023-01-01 PROCEDURE — 81001 URINALYSIS AUTO W/SCOPE: CPT | Performed by: STUDENT IN AN ORGANIZED HEALTH CARE EDUCATION/TRAINING PROGRAM

## 2023-01-01 PROCEDURE — 73560 X-RAY EXAM OF KNEE 1 OR 2: CPT | Mod: RT

## 2023-01-01 PROCEDURE — 99203 OFFICE O/P NEW LOW 30 MIN: CPT | Mod: 25 | Performed by: PODIATRIST

## 2023-01-01 RX ORDER — OXYCODONE AND ACETAMINOPHEN 5; 325 MG/1; MG/1
0.5 TABLET ORAL 2 TIMES DAILY PRN
Qty: 7 TABLET | Refills: 0 | Status: SHIPPED | OUTPATIENT
Start: 2023-01-01 | End: 2023-01-01

## 2023-01-01 RX ORDER — ACETAMINOPHEN 325 MG/1
650 TABLET ORAL ONCE
Status: COMPLETED | OUTPATIENT
Start: 2023-01-01 | End: 2023-01-01

## 2023-01-01 RX ORDER — OXYCODONE AND ACETAMINOPHEN 2.5; 325 MG/1; MG/1
1 TABLET ORAL 2 TIMES DAILY PRN
Qty: 10 TABLET | Refills: 0 | Status: SHIPPED | OUTPATIENT
Start: 2023-01-01 | End: 2023-01-01

## 2023-01-01 RX ORDER — AMMONIUM LACTATE 12 G/100G
CREAM TOPICAL 2 TIMES DAILY
Qty: 385 G | Refills: 3 | Status: ON HOLD | OUTPATIENT
Start: 2023-01-01 | End: 2024-01-01

## 2023-01-01 RX ADMIN — ACETAMINOPHEN 325MG 650 MG: 325 TABLET ORAL at 11:16

## 2023-01-01 ASSESSMENT — ACTIVITIES OF DAILY LIVING (ADL)
ADLS_ACUITY_SCORE: 35
ADLS_ACUITY_SCORE: 33
ADLS_ACUITY_SCORE: 35
ADLS_ACUITY_SCORE: 35
ADLS_ACUITY_SCORE: 37

## 2023-01-01 ASSESSMENT — PAIN SCALES - GENERAL
PAINLEVEL: EXTREME PAIN (8)
PAINLEVEL: NO PAIN (0)
PAINLEVEL: EXTREME PAIN (8)
PAINLEVEL: NO PAIN (0)

## 2023-01-17 ENCOUNTER — ANCILLARY PROCEDURE (OUTPATIENT)
Dept: GENERAL RADIOLOGY | Facility: OTHER | Age: 88
End: 2023-01-17
Attending: NURSE PRACTITIONER
Payer: MEDICARE

## 2023-01-17 ENCOUNTER — OFFICE VISIT (OUTPATIENT)
Dept: FAMILY MEDICINE | Facility: OTHER | Age: 88
End: 2023-01-17
Attending: NURSE PRACTITIONER
Payer: MEDICARE

## 2023-01-17 VITALS
TEMPERATURE: 97.4 F | HEART RATE: 108 BPM | DIASTOLIC BLOOD PRESSURE: 88 MMHG | RESPIRATION RATE: 16 BRPM | SYSTOLIC BLOOD PRESSURE: 154 MMHG | OXYGEN SATURATION: 93 %

## 2023-01-17 DIAGNOSIS — N39.0 URINARY TRACT INFECTION WITH HEMATURIA, SITE UNSPECIFIED: ICD-10-CM

## 2023-01-17 DIAGNOSIS — M79.605 PAIN OF LEFT LOWER EXTREMITY: ICD-10-CM

## 2023-01-17 DIAGNOSIS — R31.9 URINARY TRACT INFECTION WITH HEMATURIA, SITE UNSPECIFIED: ICD-10-CM

## 2023-01-17 DIAGNOSIS — R30.0 DYSURIA: Primary | ICD-10-CM

## 2023-01-17 LAB
ALBUMIN UR-MCNC: 30 MG/DL
APPEARANCE UR: ABNORMAL
BACTERIA #/AREA URNS HPF: ABNORMAL /HPF
BILIRUB UR QL STRIP: NEGATIVE
COLOR UR AUTO: YELLOW
GLUCOSE UR STRIP-MCNC: NEGATIVE MG/DL
HGB UR QL STRIP: ABNORMAL
KETONES UR STRIP-MCNC: ABNORMAL MG/DL
LEUKOCYTE ESTERASE UR QL STRIP: ABNORMAL
NITRATE UR QL: POSITIVE
PH UR STRIP: 6 [PH] (ref 5–7)
RBC #/AREA URNS AUTO: ABNORMAL /HPF
SP GR UR STRIP: >=1.03 (ref 1–1.03)
SQUAMOUS #/AREA URNS AUTO: ABNORMAL /LPF
UROBILINOGEN UR STRIP-ACNC: 1 E.U./DL
WBC #/AREA URNS AUTO: ABNORMAL /HPF

## 2023-01-17 PROCEDURE — 81001 URINALYSIS AUTO W/SCOPE: CPT | Mod: ZL | Performed by: NURSE PRACTITIONER

## 2023-01-17 PROCEDURE — 99214 OFFICE O/P EST MOD 30 MIN: CPT | Performed by: NURSE PRACTITIONER

## 2023-01-17 PROCEDURE — 73502 X-RAY EXAM HIP UNI 2-3 VIEWS: CPT | Mod: TC,FY

## 2023-01-17 PROCEDURE — 73552 X-RAY EXAM OF FEMUR 2/>: CPT | Mod: TC,LT,FY

## 2023-01-17 PROCEDURE — G0463 HOSPITAL OUTPT CLINIC VISIT: HCPCS | Performed by: NURSE PRACTITIONER

## 2023-01-17 PROCEDURE — 85025 COMPLETE CBC W/AUTO DIFF WBC: CPT | Mod: ZL | Performed by: NURSE PRACTITIONER

## 2023-01-17 PROCEDURE — 81003 URINALYSIS AUTO W/O SCOPE: CPT | Mod: ZL | Performed by: NURSE PRACTITIONER

## 2023-01-17 PROCEDURE — 80048 BASIC METABOLIC PNL TOTAL CA: CPT | Mod: ZL | Performed by: NURSE PRACTITIONER

## 2023-01-17 PROCEDURE — 36415 COLL VENOUS BLD VENIPUNCTURE: CPT | Mod: ZL | Performed by: NURSE PRACTITIONER

## 2023-01-17 PROCEDURE — 87088 URINE BACTERIA CULTURE: CPT | Mod: ZL | Performed by: NURSE PRACTITIONER

## 2023-01-17 RX ORDER — CEPHALEXIN 500 MG/1
500 CAPSULE ORAL 3 TIMES DAILY
Qty: 21 CAPSULE | Refills: 0 | Status: SHIPPED | OUTPATIENT
Start: 2023-01-17 | End: 2023-01-24

## 2023-01-17 ASSESSMENT — PAIN SCALES - GENERAL: PAINLEVEL: NO PAIN (0)

## 2023-01-17 NOTE — PROGRESS NOTES
Assessment & Plan     Bianca has been having left upper leg pain and hasn't wanted to walk the past 2 days. Her daughter denies any falls. No fevers. Decreased appetite.     After left hip and femur XRAY's reviewed, I ambulated with Bianca in the clinic hallways with a gait belt and her walker. She ambulated at baseline per her daughters report. Bianca felt OK with ambulating. No warmth to the touch to left lower extremity, erythema, or rash on skin. Supportive care discussed.     UA positive for UTI. Treat with Keflex with close follow up if not improving. No confusion. No fever in clinic today. Normal WBC. Urine culture pending.     (R30.0) Dysuria  (primary encounter diagnosis)  Comment: check UA with reflex to culture   Plan: *UA reflex to Microscopic and Culture - [facility], CBC with platelets and         differential, Basic metabolic panel, Urine         Microscopic Exam, Urine Culture            (M79.605) Pain of left lower extremity  Comment: check left hip and left femur XRAY   Plan: XR Hip Left 2-3 Views (Clinic Performed), XR         Femur Left 2 Views (Clinic Performed)            (N39.0,  R31.9) Urinary tract infection with hematuria, site unspecified  Comment: treat with Keflex. Increase water intake. Culture pending   Plan: cephALEXin (KEFLEX) 500 MG capsule             See Patient Instructions    Return if symptoms worsen or fail to improve.    SANDRA Reza [facility] [place]    Subjective   Bianca is a 89 year old accompanied by her daughter and grand daughter, presenting for the following health issues:  UTI    Left upper leg pain. Decrease in ambulation that started 2 days ago. 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     HPI     Genitourinary - Female  Onset/Duration: Unknown   Description:   Painful urination (Dysuria): Unknown            Frequency: No  Blood in urine (Hematuria): Unknown   Delay in urine (Hesitency):  Unknown   Intensity: Unknown   Progression of Symptoms:  same  Accompanying Signs & Symptoms:  Fever/chills: No  Flank pain: No  Nausea and vomiting: No  Vaginal symptoms: none  Abdominal/Pelvic Pain: No  History:   History of frequent UTI s: 10/2020 Hospitalization for UTI   History of kidney stones: No  Sexually Active: No  Possibility of pregnancy: No  Precipitating or alleviating factors: None  Therapies tried and outcome: None  Fowl and strong smelling urine.       Review of Systems   Constitutional, HEENT, cardiovascular, pulmonary, GI, , musculoskeletal, neuro, skin, endocrine and psych systems are negative, except as otherwise noted.      Objective    BP (!) 154/88   Pulse 108   Temp 97.4  F (36.3  C)   Resp 16   SpO2 93%   There is no height or weight on file to calculate BMI.  Physical Exam   GENERAL: healthy, alert and no distress  RESP: lungs clear to auscultation - no rales, rhonchi or wheezes  CV: regular rate and rhythm, normal S1 S2, no S3 or S4, no murmur, click or rub,  peripheral pulses strong. +1 edema to bilateral lower extremities   MS: no gross musculoskeletal defects noted, no edema. +TTP to lateral mid femur region. No TTP to left hip joint. No rash erythema, bruising, or warmth to the touch to left lower extremity. Distal pulses intact. +1 edema to bilateral lower extremities  SKIN: no suspicious lesions or rashes  PSYCH: mentation appears normal, affect normal/bright

## 2023-01-18 LAB
ANION GAP SERPL CALCULATED.3IONS-SCNC: 12 MMOL/L (ref 7–15)
BASOPHILS # BLD AUTO: 0.1 10E3/UL (ref 0–0.2)
BASOPHILS NFR BLD AUTO: 1 %
BUN SERPL-MCNC: 21.1 MG/DL (ref 8–23)
CALCIUM SERPL-MCNC: 9.7 MG/DL (ref 8.2–9.6)
CHLORIDE SERPL-SCNC: 105 MMOL/L (ref 98–107)
CREAT SERPL-MCNC: 1.14 MG/DL (ref 0.51–0.95)
DEPRECATED HCO3 PLAS-SCNC: 21 MMOL/L (ref 22–29)
EOSINOPHIL # BLD AUTO: 0.1 10E3/UL (ref 0–0.7)
EOSINOPHIL NFR BLD AUTO: 2 %
ERYTHROCYTE [DISTWIDTH] IN BLOOD BY AUTOMATED COUNT: 22.4 % (ref 10–15)
GFR SERPL CREATININE-BSD FRML MDRD: 45 ML/MIN/1.73M2
GLUCOSE SERPL-MCNC: 99 MG/DL (ref 70–99)
HCT VFR BLD AUTO: 32.6 % (ref 35–47)
HGB BLD-MCNC: 9.3 G/DL (ref 11.7–15.7)
IMM GRANULOCYTES # BLD: 0 10E3/UL
IMM GRANULOCYTES NFR BLD: 0 %
LYMPHOCYTES # BLD AUTO: 1.1 10E3/UL (ref 0.8–5.3)
LYMPHOCYTES NFR BLD AUTO: 23 %
MCH RBC QN AUTO: 19.3 PG (ref 26.5–33)
MCHC RBC AUTO-ENTMCNC: 28.5 G/DL (ref 31.5–36.5)
MCV RBC AUTO: 68 FL (ref 78–100)
MONOCYTES # BLD AUTO: 0.4 10E3/UL (ref 0–1.3)
MONOCYTES NFR BLD AUTO: 9 %
NEUTROPHILS # BLD AUTO: 3.1 10E3/UL (ref 1.6–8.3)
NEUTROPHILS NFR BLD AUTO: 65 %
NRBC # BLD AUTO: 0 10E3/UL
NRBC BLD AUTO-RTO: 0 /100
PLATELET # BLD AUTO: 291 10E3/UL (ref 150–450)
POTASSIUM SERPL-SCNC: 4.2 MMOL/L (ref 3.4–5.3)
RBC # BLD AUTO: 4.81 10E6/UL (ref 3.8–5.2)
SODIUM SERPL-SCNC: 138 MMOL/L (ref 136–145)
WBC # BLD AUTO: 4.8 10E3/UL (ref 4–11)

## 2023-01-20 LAB
BACTERIA UR CULT: ABNORMAL
BACTERIA UR CULT: ABNORMAL

## 2023-04-05 NOTE — ED NOTES
Discharge instructions reviewed with daughter Selene, pt refuses vital signs. She just want to go home.

## 2023-04-05 NOTE — DISCHARGE INSTRUCTIONS
The Steri-Strips will fall off on their own.  These are to strictly reinforce the wound edges and if they fall off its just fine.    You may clean the wound in 24 hours.    Watch for evidence of infection.    Suture removal in approximately 7 days depending on wound margin edges etc.    You can expect appreciable worsening bruising over the right face into the cheek over the next several days to weeks.    Please return to this emergency department for any questions or concerns.

## 2023-04-05 NOTE — ED NOTES
"Pt fell from standing, is not currently on any medications. Pt was using her walker, took a few steps from it and fell face first. Denies any loss of conciousness. Pt is Jena, and has some dementia per family. The laceration is to her right forehead, it is approximetly 2\" inches long by 1.5 inches wide. Pt laceration is clearly painful to touch. Pt is uncomfortable , rising her right leg, grimacing.   "

## 2023-04-05 NOTE — ED PROVIDER NOTES
History     Chief Complaint   Patient presents with     Fall            Head Laceration     The history is provided by the patient and a relative.     Bianca Greene is a 89 year old female who presented to the emergency department via wheelchair along with family for evaluation of a head injury.  Mechanical fall from standing.  No LOC.  Has a history of dementia.  Does point to her right hand when reporting any other pain.    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     Priority: Medium        Past Medical History:    Past Medical History:   Diagnosis Date     Abdominal pain, left lower quadrant 5/14/2002     Chest pain, unspecified 6/26/2007     Other screening mammogram 5/22/2002     Venous stasis ulcer (H)        Past Surgical History:    Past Surgical History:   Procedure Laterality Date     EYE SURGERY  2010    Bilateral cataracts     HYSTERECTOMY       left hip replacement  2009    RIGHT hip replacement per pt     TONSILLECTOMY         Family History:    Family History   Problem Relation Age of Onset     Heart Disease Father         heart disease; cause of death     Dementia Mother 89        cause of death     Osteoporosis Mother      Cancer - colorectal Sister      Cancer Son 54        brain     Cancer Sister 62        leukemia; cause of death     Cerebrovascular Disease Son      Suicide Brother        Social History:  Marital Status:   [5]  Social History     Tobacco Use     Smoking status: Never     Smokeless tobacco: Never   Substance Use Topics     Alcohol use: No     Drug use: No         Medications:    No current outpatient medications on file.        Review of Systems   HENT:        See HPI   Musculoskeletal:        See HPI       Physical Exam   BP: (!) 195/105  Pulse: 77  Temp: 97.1  F (36.2  C)  Resp: 18  SpO2: 96 %      Physical Exam  Vitals and nursing note reviewed.   Constitutional:       General: She is not in acute distress.     Appearance: Normal appearance. She is normal weight. She is not ill-appearing, toxic-appearing or diaphoretic.   HENT:      Head:      Comments: 5 cm gaping half-moon shaped laceration to the right frontal area.  She does have some tenderness upon palpation of the upper orbit.  Cardiovascular:      Rate and Rhythm: Normal rate and regular rhythm.   Pulmonary:      Effort: Pulmonary effort is normal.   Abdominal:      Palpations: Abdomen is soft.   Musculoskeletal:         General: No swelling, tenderness or deformity.      Cervical back: No rigidity.      Right lower leg: No edema.      Left lower leg: No edema.      Comments: Active and passive range of motion of the right lower extremity shows no evidence of crepitus, deformity, tenderness, or edema in the ankle, knee, femur, or hip.  She has normal range of motion passively and actively of the right hip.  Left lower extremities unremarkable.  Examination of right hand reveals some ecchymosis to the dorsal aspect without evidence of acute deformity.  She has normal range of motion of the digits.   Skin:     General: Skin is warm and dry.      Capillary Refill: Capillary refill takes less than 2 seconds.   Neurological:      General: No focal deficit present.      Mental Status: She is alert and oriented to person, place, and time.   Psychiatric:         Mood and Affect: Mood normal.         ED Course                 [facility]    -Laceration Repair    Date/Time: 4/5/2023 3:21 PM    Performed by: Sara Amaya PA-C  Authorized by: Sara Amaya PA-C    Risks, benefits and alternatives  discussed.      ANESTHESIA (see MAR for exact dosages):     Anesthesia method:  Local infiltration    Local anesthetic:  Lidocaine 1% WITH epi  LACERATION DETAILS     Location:  Scalp    Scalp location:  Frontal    Length (cm):  5    REPAIR TYPE:     Repair type:  Simple      EXPLORATION:     Hemostasis achieved with:  Epinephrine    Wound exploration: entire depth of wound probed and visualized      Contaminated: no      TREATMENT:     Area cleansed with:  Saline    Amount of cleaning:  Extensive    Irrigation volume:  250    Irrigation method:  Syringe    Visualized foreign bodies/material removed: no      SKIN REPAIR     Repair method:  Staples and sutures    Suture size:  4-0    Suture material:  Nylon    Number of sutures:  8    APPROXIMATION     Approximation:  Close    POST-PROCEDURE DETAILS     Dressing:  Open (no dressing)        PROCEDURE    Patient Tolerance:  Patient tolerated the procedure well with no immediate complications                Critical Care time:  none               Results for orders placed or performed during the hospital encounter of 04/05/23 (from the past 24 hour(s))   XR Hand Right G/E 3 Views    Narrative    PROCEDURE:  XR HAND RIGHT G/E 3 VIEWS    HISTORY: fall with dorsal injury    COMPARISON:  None.    TECHNIQUE:  3views of the right hand were obtained.    FINDINGS:  Severe arthritic changes are seen at the scaphotrapezium  scaphotrapezoid articulations. Moderate arthritic changes are seen at  the trapezium first metacarpal articulation. There are moderate  degenerative changes seen at the interphalangeal joint of thumb. There  are no acute fractures or dislocations or destructive lesions.       Impression    IMPRESSION: No acute fracture.      ELSA MCQUEEN MD         SYSTEM ID:  [id number]   CT Head w/o Contrast    Narrative    PROCEDURE: CT HEAD W/O CONTRAST     HISTORY: fall with head injury.    COMPARISON: 10/30/2019    TECHNIQUE:  Helical images of the head from the  foramen magnum to the  vertex were obtained without contrast. This CT exam was performed  using one or more the following dose reduction techniques: automated  exposure control, adjustment of the mA and/or kV according to patient  size, and/or iterative reconstruction technique.    FINDINGS: The ventricles and sulci are prominent, compatible with  advanced, generalized volume loss. No acute intracranial hemorrhage,  mass effect, midline shift, hydrocephalus or basilar cystern  effacement are present.    No acute transcortical ischemia is identified. Advanced microvascular  ischemic changes are chronic.    The calvarium is intact. Soft tissue swelling/laceration is present  over the lateral aspect of the right supraorbital ridge.      Impression    IMPRESSION: No acute intracranial hemorrhage or calvarial fracture.      OLIVE ZARATE MD         SYSTEM ID:  [id number]   Cervical spine CT w/o contrast    Narrative    PROCEDURE: CT CERVICAL SPINE W/O CONTRAST     HISTORY: fall, fall with head injury.    TECHNIQUE: Helical noncontrast CT images of the cervical spine. This  CT exam was performed using one or more the following dose reduction  techniques: automated exposure control, adjustment of the mA and/or kV  according to patient size, and/or iterative reconstruction technique.    COMPARISON: None.    FINDINGS:     No acute fracture is identified. The vertebral bodies are normal in  height. Dextroscoliosis. The cervical lordosis is relatively  preserved. The C1-2 articulation and the craniocervical junction are  intact.     Diffuse degenerative changes are present. Slightly fragmentary head  osteophytes on the right at C2-3 and C3-4 appear well-corticated and  are favored to be chronic.     The paravertebral soft tissues are unremarkable. Mild emphysematous  changes are seen at the apices.      Impression    IMPRESSION: No evidence of acute cervical spine fracture.    OLIVE ZARATE MD         SYSTEM ID:   [id number]       Medications - No data to display    Assessments & Plan (with Medical Decision Making)   89-year-old female with a mechanical fall from standing.  No high risk or red flag features.  No neck discomfort.  No cervical injury noted on CT scan.  Head CT shows no evidence of intracranial catastrophe or skull fracture.  No evidence of hand fracture.  Examination of the lower extremities shows normal passive and active range of motion without evidence of deformity or tenderness.  I do not believe there is any reasonable indication for imaging at this time.  Laceration repaired as above.  See discharge instructions.  Return here for any new or worsening symptoms.    This document was prepared using a combination of typing and voice generated software.  While every attempt was made for accuracy, spelling and grammatical errors may exist.    I have reviewed the nursing notes.    I have reviewed the findings, diagnosis, plan and need for follow up with the patient.           Medical Decision Making  The patient's presentation was of low complexity (an acute and uncomplicated illness or injury).    The patient's evaluation involved:  ordering and/or review of 1 test(s) in this encounter (imaging )    The patient's management necessitated moderate risk (a decision regarding minor procedure (laceration repair) with risk factors of dementia ).        New Prescriptions    No medications on file       Final diagnoses:   Fall, initial encounter   Head injury, initial encounter   Contusion of right hand, initial encounter   Scalp laceration, initial encounter       4/5/2023   [facility]     Sara Amaya PA-C  04/05/23 1538

## 2023-04-05 NOTE — ED PROVIDER NOTES
Emergency Department Attending Supervision Note    I have personally seen and examined the patient.  Case reviewed and discussed with Sara Amaya (ATILIO Name).  I have reviewed and agree with the PMH, FH, SOC, ROS.  Please see today's note by ATILIO.  ATILIO care under my supervision.    Key Exam:  Right orbital area laceration with swelling. Alert and interactive.     Assessment:  Closed head injury with eyebrow/orbital laceration    Key Medical Decision Making/Plan:  Reassuring imaging and exam. Likely discharge.       Drew Chong MD on 4/5/2023 at 2:31 PM

## 2023-04-06 NOTE — TELEPHONE ENCOUNTER
ER F/U  4/5/23  Fall   Fall, initial encounter +3 more  Clinical impression  Fall   Head Laceration  Chief complaint         Assessments & Plan (with Medical Decision Making)   89-year-old female with a mechanical fall from standing.  No high risk or red flag features.  No neck discomfort.  No cervical injury noted on CT scan.  Head CT shows no evidence of intracranial catastrophe or skull fracture.  No evidence of hand fracture.  Examination of the lower extremities shows normal passive and active range of motion without evidence of deformity or tenderness.  I do not believe there is any reasonable indication for imaging at this time.  Laceration repaired as above.  See discharge instructions.  Return here for any new or worsening symptoms.     This document was prepared using a combination of typing and voice generated software.  While every attempt was made for accuracy, spelling and grammatical errors may exist.     I have reviewed the nursing notes.     I have reviewed the findings, diagnosis, plan and need for follow up with the patient.         Patient has a white jay among the purple bruise on her face. Daughter is concerned about the white jay.  Patient is not feverish, no drainage from the laceration.  Patient has 8 stitches.  Patient is very tired today from the experience yesterday.  Selene, daughter, said she could bring her in tomorrow to be seen but not today, patient is too tired.      Selene [phone]  Jamel Son  [phone]

## 2023-04-06 NOTE — TELEPHONE ENCOUNTER
3:27 PM    Reason for Call: Phone Call    Description: white yellow bumps coming out of purple skin around her eyes/fell/eyes are swollen shut/dtr is worried this may be an infection/    Was an appointment offered for this call? No  If yes : Appointment type              Date    Preferred method for responding to this message: Telephone Call  What is your phone number ?[phone]/[email or web address]    If we cannot reach you directly, may we leave a detailed response at the number you provided? Yes    Can this message wait until your PCP/provider returns, if available today? Not applicable    Lawanda Dominguez

## 2023-04-07 NOTE — TELEPHONE ENCOUNTER
Discussed with PCP - Lucien states that she is not necessarily concerned with the white discoloration as much as the overall general deconditioning that can occur with fatigue, decreased fluids and nutrition. Selene updated to PCP recommendation to have patient evaluated in ER for current status.

## 2023-04-07 NOTE — TELEPHONE ENCOUNTER
Please see attached and advise.  ENRIQUE for medical, [place], concerned with white discolorations on bruising and updates to attached below       Thank you !!  Bianca is sleeping and only gets up to go potty .   The first pic is the injury before stitches . The next two are the white and yellow dots on her face .   Thank you for your time      Attachments    [id number]   [id number]   [id number]

## 2023-04-13 NOTE — PROGRESS NOTES
Assessment & Plan     (Z48.02) Visit for suture removal  (primary encounter diagnosis)  Comment: sutures X8 removed from right side of forehead   Plan: No infection from wound. Sutures removed. Incision secured with steri strips. Tolerated suture removal well      See Patient Instructions    Return if symptoms worsen or fail to improve.    SANDRA Reza CNP  [facility]    Carolina Valenzuela is a 89 year old, presenting for the following health issues:    Suture Removal    HPI     Suture removal:     Date sutures applied: 4/5/2023         Where (setting) in which they applied:ER visit    Description:  Type: sutures  Location: Right forehead     History:    Cause of laceration: Fall    Accompanying Signs & Symptoms: (staff: if yes-describe)  Redness: No  Warmth: No  Drainage: No  Still bleeding: No  Fevers: No    Last tetanus shot: Unknown     She has been doing OK since fall.     She is eating and drinking well    Bowel and bladder are working well         Review of Systems   Constitutional, HEENT, cardiovascular, pulmonary, gi and gu systems are negative, except as otherwise noted.      Objective    /66   Pulse 96   Temp (!) 96.7  F (35.9  C)   Resp 22   SpO2 96%   There is no height or weight on file to calculate BMI.  Physical Exam   GENERAL: healthy, alert and no distress  NECK: no adenopathy, no asymmetry, masses, or scars and thyroid normal to palpation  RESP: lungs clear to auscultation - no rales, rhonchi or wheezes  CV: regular rate and rhythm, normal S1 S2, no S3 or S4, no murmur, click or rub, no peripheral edema and peripheral pulses strong  MS: no gross musculoskeletal defects noted, no edema  SKIN: no suspicious lesions or rashes. Sutures intact to right side of forehead. Sutures removed with incision intact without infection. +bruising fading to face to a light yellow discoloration   PSYCH: mentation appears normal, affect normal/bright

## 2023-05-18 NOTE — ED TRIAGE NOTES
"Pt from home via ambulance after a fall \"a few days ago\"resulting in right leg pain. Usually ambulates with a walker, was found \"sitting on her butt at her closet.\" Bilateral pedal pulses intact. R>L. Confused. Hx of bilateral hip replacement. Daughter here.      "

## 2023-05-18 NOTE — DISCHARGE INSTRUCTIONS
Return to the emergency department for worsening symptoms or new concerning symptoms.  Continue with home care, follow-up closely with your primary care provider at the appointment that Luzmaria is set up for you.  Continue working with PT/for home care.  [facility]  Call to schedule an appointment for soon as possible, ideally early next week.  The phone number is listed above

## 2023-05-18 NOTE — ED PROVIDER NOTES
History     Chief Complaint   Patient presents with     Fall     Right hip pain     HPI  Bianca Greene is a 89 year old female with a history of dementia and bilateral hip replacement presents to the emergency department 2 days after a fall.  It was ground-level and unwitnessed she was next to her walker is sitting up when her family found her.  She complained of right leg pain and a bruise developed.  No evidence of head or neck trauma was noted by family or the patient.  She does not complain of pain at rest but does complain of pain when her right leg is touched.  She is no longer ambulatory since this fall.    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     Priority: Medium        Past Medical History:    Past Medical History:   Diagnosis Date     Abdominal pain, left lower quadrant 5/14/2002     Chest pain, unspecified 6/26/2007     Other screening mammogram 5/22/2002     Venous stasis ulcer (H)        Past Surgical History:    Past Surgical History:   Procedure Laterality Date     EYE SURGERY  2010    Bilateral cataracts     HYSTERECTOMY       left hip replacement  2009    RIGHT hip replacement per pt     TONSILLECTOMY         Family History:    Family History   Problem Relation Age of Onset     Heart Disease Father         heart disease; cause of death     Dementia Mother 89        cause of death     Osteoporosis Mother      Cancer - colorectal Sister      Cancer Son 54        brain     Cancer Sister 62        leukemia; cause of death     Cerebrovascular Disease Son      Suicide Brother         Social History:  Marital Status:   [5]  Social History     Tobacco Use     Smoking status: Never     Smokeless tobacco: Never   Substance Use Topics     Alcohol use: No     Drug use: No        Medications:    No current outpatient medications on file.        Review of Systems  See HPI  Physical Exam   BP: 146/88  Pulse: 100  Temp: 96.9  F (36.1  C)  Resp: 24  SpO2: 96 %      Physical Exam  Constitutional: Alert and conversant. NAD   HENT: NCAT   Eyes: Normal pupils   Neck: supple   CV: Normal rate, regular rhythm, no murmur   Pulmonary/Chest: Non-labored respirations, clear to auscultation bilaterally   Abdominal: Soft, non-tender, non-distended   MSK: CHEEMA.  Mild swelling on the right lower extremity with bruising along the medial aspect of the thigh just  proximal to the knee.  There is tenderness in the area  Neuro: Alert and appropriate   Skin: Warm and dry. No diaphoresis. No rashes on exposed skin    Psych: Appropriate mood and affect     ED Course              ED Course as of 05/18/23 1433   Thu May 18, 2023   1248 89-year-old female here 2 days after a fall.  No evidence of head trauma no reported head trauma.  Patient behaving at her baseline.  Complaining of pain in her right thigh.  No other injuries noted.   1249 Bianca Greene is a 89 year old female presenting with fall. Differential includes but is not limited to syncope, mechanical fall, cerebrovascular accident, intracranial hemorrhage, skull fracture, spinal column fracture, muscular strain, fracture/dislocation of extremity. History is consistent with mechanical fall and patient does not require a syncope workup. Based on the mechanism of injury, patient age and comorbidities, symptoms, and exam findings, intracranial hemorrhage or fracture/dislocation cannot be ruled out by history and exam and this patient requires diagnostic imaging. Imaging revealed loosened hardware. Dr. Burgos from  recommends against emergent surgery  and notes the patient will require follow up with OA in clinic for discussion about what to do next. NO superficial injuries. Tetanus not indicated.    1335 Documentation of Face to Face and Certification for Home Health Services    I certify that: Bianca Greene is under my care and that I had a face-to-face encounter that meets the physician face-to-face encounter requirements on: 5/18/2023    This encounter with the patient was in whole, or in part, for the following medical condition, which is the primary reason for home health care: fall with loosened hip replacement hardware    I certify that, based on my findings, the following services are medically necessary home health services: Physical Therapy.    My clinical findings support the need for the above services because: Physical Therapy Services are needed to assess and treat the following functional impairments: fall with loosened hip hardware.    Further, I certify that my clinical findings support that this patient is homebound (i.e. absences from home require considerable and taxing effort and are for medical reasons or [employer] services or infrequently or of short duration when for other reasons) because: Leaving home is medically contraindicated for the following reason(s): weakness, difficulty walking. and Requires assistance of another person or specialized equipment to access medical services because patient: Is unable to exit home safely on own due to: hardware loosened in right hip/femur. and Requires supervision of another for safe transfer...    Based on the above findings. I certify that this patient is confined to the home and needs intermittent skilled nursing care, physical therapy and/or speech therapy.  The patient is under my care, and I have initiated the establishment of the plan of care.  This patient will be followed by a physician who will periodically review the plan of care.  Physician/Provider to provide follow up care:  Mell Burrell    Attending hospital physician (the Medicare certified [facility] provider): Jose Alberts MD  Physician Signature: See electronic signature associated with these discharge orders.  Date: 5/18/2023       Procedures            Results for orders placed or performed during the hospital encounter of 05/18/23 (from the past 24 hour(s))   UA with Microscopic reflex to Culture    Specimen: Urine, Catheter   Result Value Ref Range    Color Urine Yellow Colorless, Straw, Light Yellow, Yellow    Appearance Urine Slightly Cloudy (A) Clear    Glucose Urine Negative Negative mg/dL    Bilirubin Urine Negative Negative    Ketones Urine Negative Negative mg/dL    Specific Gravity Urine 1.023 1.003 - 1.035    Blood Urine Negative Negative    pH Urine 5.5 4.7 - 8.0    Protein Albumin Urine 10 (A) Negative mg/dL    Urobilinogen Urine 2.0 Normal, 2.0 mg/dL    Nitrite Urine Negative Negative    Leukocyte Esterase Urine Negative Negative    Bacteria Urine Few (A) None Seen /HPF    RBC Urine 4 (H) <=2 /HPF    WBC Urine 1 <=5 /HPF    Squamous Epithelials Urine 1 <=1 /HPF    Narrative    Urine Culture not indicated   XR Pelvis 1/2 Views    Narrative    PROCEDURE: XR PELVIS 1/2 VIEWS, XR FEMUR RIGHT 2 VIEWS 5/18/2023 9:16  AM    HISTORY: pain    COMPARISONS: Left hip dated 1/17/2023.    TECHNIQUE: Single AP view of the pelvis. Additional AP and lateral  views of the right femur.    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 Inferior left  femoral component is not included in the exam.    No acute bony complication is seen.    There is some generalized osteopenia with degenerative change in the  lower lumbar spine.    No femur fracture is seen. However, there is some lucency around the  tip of the right femoral component and at the cement bone interface of  the proximal lateral femur on the right.         Impression    IMPRESSION: Bilateral hip arthroplasties without acute fracture or  dislocation.    Findings worrisome  for loosening of the right femoral component.    JAGDISH CELESTIN MD         SYSTEM ID:  [id number]   XR Femur Right 2 Views    Narrative    PROCEDURE: XR PELVIS 1/2 VIEWS, XR FEMUR RIGHT 2 VIEWS 5/18/2023 9:16  AM    HISTORY: pain    COMPARISONS: Left hip dated 1/17/2023.    TECHNIQUE: Single AP view of the pelvis. Additional AP and lateral  views of the right femur.    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 Inferior left  femoral component is not included in the exam.    No acute bony complication is seen.    There is some generalized osteopenia with degenerative change in the  lower lumbar spine.    No femur fracture is seen. However, there is some lucency around the  tip of the right femoral component and at the cement bone interface of  the proximal lateral femur on the right.         Impression    IMPRESSION: Bilateral hip arthroplasties without acute fracture or  dislocation.    Findings worrisome for loosening of the right femoral component.    JAGDISH CELESTIN MD         SYSTEM ID:  [id number]   XR Knee Right 1/2 Views    Narrative    PROCEDURE: XR KNEE RIGHT 1/2 VIEWS 5/18/2023 9:17 AM    HISTORY: pain    COMPARISONS: None.    TECHNIQUE: 2 views.    FINDINGS: There is some generalized osteopenia. No acute fracture or  dislocation is seen. No significant joint effusion is seen.         Impression    IMPRESSION: No acute fracture.    JAGDISH CELESTIN MD         SYSTEM ID:  [email or web address]   XR Ankle Right 3 Views    Narrative    PROCEDURE: XR ANKLE RIGHT G/E 3 VIEWS 5/18/2023 9:18 AM    HISTORY: pain    COMPARISONS: None.    TECHNIQUE: 3 views.    FINDINGS: No acute fracture or dislocation is seen. There is some  generalized osteopenia. There is no focal bone lesion.         Impression    IMPRESSION: No acute fracture.    JAGDISH CELESTIN MD         SYSTEM ID:  [email or web address]       Medications   acetaminophen (TYLENOL) tablet 650 mg (650 mg Oral $Given 5/18/23 1116)       Assessments & Plan (with  Medical Decision Making)     I have reviewed the nursing notes.    I have reviewed the findings, diagnosis, plan and need for follow up with the patient.    There are no discharge medications for this patient.      Final diagnoses:   Fall, initial encounter   Pain of right lower extremity   Pain from implanted hardware, initial encounter       5/18/2023   [facility]     Jose Alberts MD  05/18/23 7570

## 2023-05-18 NOTE — PROGRESS NOTES
05/18/23 1700   Appointment Info   Signing Clinician's Name / Credentials (PT) Juan Gonsalves DPT   Rehab Comments (PT) Pt was resting comfortably on ED bed upon approach with daughter Selene and son Jamel at bedside throughout this eval and assisted with home environment and PLOF info.   Living Environment   People in Home child(monique), adult   Current Living Arrangements house   Home Accessibility no concerns   Transportation Anticipated family or friend will provide   Living Environment Comments Pt lives with her adult son Jamel in a 1 level home on a slab so no entry steps only a small slope.  Bathroom has a walk-in shower wiht grab bars and a shower chair.  Also has a bedside commode which family also uses as an over toilet commode as needed.  This info provided by family d/t pt's dementia and extremely [place].   Self-Care   Usual Activity Tolerance moderate   Current Activity Tolerance fair   Equipment Currently Used at Home commode chair;walker, rolling   Fall history within last six months yes   Number of times patient has fallen within last six months 2   Activity/Exercise/Self-Care Comment Family reports pt was Independent with household mobility with FWW and Zeeshan with dressing and most the time with toileting prior to this recent fall.  She did have SBA for showering.   General Information   Onset of Illness/Injury or Date of Surgery 05/18/23   Referring Physician Dr. Jose Alberts   Patient/Family Therapy Goals Statement (PT) Per family report, pt strongly desires to return home today.   Pertinent History of Current Problem (include personal factors and/or comorbidities that impact the POC) Pt had unwitnessed fall at home 2 days ago and presented to ER today with R hip pain.  R femur X-ray IMPRESSION: Bilateral hip arthroplasties without acute fracture or  dislocation.     Findings worrisome for loosening of the right femoral component.  Pt is WBAT on RLE.   Existing Precautions/Restrictions no known  precautions/restrictions   Weight-Bearing Status - RLE weight-bearing as tolerated   Cognition   Affect/Mental Status (Cognition) confused   Orientation Status (Cognition) person   Follows Commands (Cognition) 25-49% accuracy  (Pt had difficulty following commands d/t her dementia and extremely [place].)   Safety Deficit (Cognition) ability to follow commands   Memory Deficit (Cognition)   (Difficult to assess)   Pain Assessment   Patient Currently in Pain Yes, see Vital Sign flowsheet  (Pt unable to comprehend typical 0-10/10 pain scale, but on exhibited signs of pain (facial wincing/grimacing) with hip flexion AROM and RLE WBing.)   Posture    Posture Kyphosis;Forward head position;Protracted shoulders   Range of Motion (ROM)   ROM Comment LLE AROM WFL; R ankle and knee AROM WFL; L hip AROM mildly limited d/t R hip pain   Strength (Manual Muscle Testing)   Strength (Manual Muscle Testing) Deficits observed during functional mobility   Bed Mobility   Bed Mobility rolling left;supine-sit   Rolling Left Vance (Bed Mobility) supervision;verbal cues;nonverbal cues (demo/gesture)   Supine-Sit Vance (Bed Mobility) contact guard;verbal cues;nonverbal cues (demo/gesture)   Bed Mobility Limitations impaired ability to control trunk for mobility;decreased ability to use legs for bridging/pushing   Impairments Contributing to Impaired Bed Mobility pain;decreased strength   Assistive Device (Bed Mobility) bed rails   Comment, (Bed Mobility) Difficult to get pt to initiate bed mobility d/t impaired comprehension and extremely [place].   Transfers   Transfers bed-chair transfer;sit-stand transfer   Transfer Safety Concerns Noted decreased balance during turns;decreased step length   Impairments Contributing to Impaired Transfers pain;decreased strength   Bed-Chair Transfer   Assistive Device (Bed-Chair Transfers) walker, front-wheeled   Bed-Chair Vance (Transfers) contact guard   Comment, (Bed-Chair Transfer) Pt  needed verbal/tactile cues for sequencing.   Sit-Stand Transfer   Sit-Stand [place] (Transfers) minimum assist (75% patient effort);verbal cues;nonverbal cues (demo/gesture)   Assistive Device (Sit-Stand Transfers) walker, front-wheeled   Comment, (Sit-Stand Transfer) Pt moved sit->stand from ER bed up to FWW first time with Keon, but as soon as felt weight on RLE sat back down on bed.  Second time pt stood up with Keon and able to convince her to remain standing.   Gait/Stairs (Locomotion)   [place] Level (Gait) contact guard;minimum assist (75% patient effort)   Assistive Device (Gait) walker, front-wheeled   Distance in Feet 30 ft   Pattern (Gait) step-to;step-through   Deviations/Abnormal Patterns (Gait) antalgic   Comment, (Gait/Stairs) Pt amb approx 30 ft with FWW and mostly CGA except occasional Keon to help with FWW managment on turns.  Pt demonstrated a mild R antalgic pattern and with partial step through LLE and leading with RLE.  Pt was only min unsteady with turns, but no LOB.   Balance   Balance other (describe)   Balance Comments Sitting balance = Fair+/Good- without support.  Standing balance = Fair with FWW support.   Clinical Impression   Criteria for Skilled Therapeutic Intervention Evaluation only   PT Diagnosis (PT) N/A eval only   Influenced by the following impairments N/A eval only   Functional limitations due to impairments N/A eval only   Clinical Presentation (PT Evaluation Complexity) Stable/Uncomplicated   Clinical Presentation Rationale Clinical judgment   Clinical Decision Making (Complexity) low complexity   Planned Therapy Interventions (PT)   (N/A)   Anticipated Equipment Needs at Discharge (PT)   (Pt has all equipment in place at home)   Risk & Benefits of therapy have been explained evaluation/treatment results reviewed;care plan/treatment goals reviewed;risks/benefits reviewed;current/potential barriers reviewed;participants voiced agreement with care plan;participants  included;patient;daughter;son   Clinical Impression Comments Pt is a 89 y/o female whom presented to ED following a fall at home with R hip pain - no fx and WBAT.  Upon PT assessment pt was able to complete bed mob with SBA and transfers and gait x30 ft each with FWW CGA to occasional brief Keon.  She appears safe to return home with her very supportive family as will not have to negotiate stairs either.   PT Total Evaluation Time   PT Eval, Low Complexity Minutes (41882) 20   PT Discharge Planning   PT Plan Discharge acute care PT   PT Discharge Recommendation (DC Rec) home with home care physical therapy   PT Rationale for DC Rec Pt able to discharge home safely with family assist, but mobility is still mod impaired compared to reported baseline so would benefit from homecare PT to help restore function and RLE strength/ROM as able.   PT Brief overview of current status Bed mob with SBA and transfers and gait x30 ft each with FWW CGA to occasional brief Keon.   Total Session Time   Total Session Time (sum of timed and untimed services) 20

## 2023-05-18 NOTE — ED NOTES
Care Transitions focused note:      Chart reviewed, patient here after falls at home.  Loose hardware in hip. At baseline patient walks with a walker and is incontinent.  Lives at home with son.  Has been to [facility] but family felt they were too short staffed and patient had multiple falls there.     Family would like SNF but are willing to take her home with home therapy until snf can be arranged as an out patient. I placed a call to home care to see about home therapy.  Call to [facility] and Jarred George as well.    Will await calls back.    MARGY Herring

## 2023-05-19 NOTE — PROGRESS NOTES
Assessment & Plan     Pain of right lower extremity  Pain from implanted hardware, subsequent encounter  Recent mechanical fall with ongoing right lower extremity pain; XR concerning for hardware loosening.  Discussion regarding patient's surgical candidacy, but will still pursue orthopedic referral and options/opinions.  In the meantime really struggling with pain control.  She is with one of her children 24/7 and discussed risk/benefits of low-dose opioid analgesics.  Also started preliminary discussion about possible palliative care consult to assist with some of her home cares, analgesia, and the family's ultimate goal to provide comfort/quality of life at this point.  - oxyCODONE-acetaminophen 2.5-325 MG TABS; Take 1 tablet by mouth 2 times daily as needed (Severe pain)  - Poor candidate for NSAIDs  - Orthopedic  Referral; Future  - Consider palliative care consult; plan to discuss further with PCP    37 minutes spent by me on the date of the encounter doing chart review, history and exam, documentation and further activities per the note     MED REC REQUIRED  Post Medication Reconciliation Status:  Discharge medications reconciled and changed, see notes/orders    Follow-up 2 weeks for pain or sooner as needed.    Parvez Dick MD  [facility]    Addendum:  Pharmacy called later in the afternoon and does not currently have 2.5/325 but can fill scored 5/325.  Initial prescription canceled, patient updated.  Pain of right lower extremity  Pain from implanted hardware, subsequent encounter  - oxyCODONE-acetaminophen (PERCOCET) 5-325 MG tablet; Take 0.5 tablets by mouth 2 times daily as needed for pain  Dispense: 7 tablet; Refill: 0    Subjective   Bianca is a 89 year old, presenting for the following health issues:  ER F/U        4/13/2023    11:16 AM   Additional Questions   Roomed by ROSEMARIE Renteria   Accompanied by Daughter, Vernell     HPI     ED[place]    [place]   [facility] [place]: 05/18/2023  Reason for visit: Fall, pain of right lower extremity, Pain of implanted hardware  Current Status: Same pain 8/10;     -POVernell CHAN states that due to age patient is likely not candidate for surgery. States that patient wakes up in the night crying from pain. Tylenol does not suffice long term. States that patient sleeps and has been sleeping in recliner. PT coming into home tomorrow for PT visit. POA reporting patients diminished ROM due to pain.     -POA states that patient does not like to take pain management medications and would benefit from a stronger medication than tylenol.     -No significant change since ED visit few days ago  -Struggling with comfort/pain control, particularly at night  -Wakes up in the middle the night crying and rubbing her leg  -Fair amount abdominal ecchymosis on both lateral and medial side of femur/knee  -Trying to keep patient home if at all feasible-  -No orthopedics referral set up yet  -Has been doing Tylenol as needed but this not cutting it  -No additional fall/injury  -Does complain of hip, femur, knee pain    Review of Systems   Limited by patient's communication ability      Objective    /54   Pulse 90   Temp 97  F (36.1  C)   Resp 18   SpO2 97%   There is no height or weight on file to calculate BMI.  Physical Exam  Vitals reviewed.   Constitutional:       General: She is not in acute distress.     Appearance: Normal appearance. She is not toxic-appearing.      Comments: Sitting in wheelchair throughout visit.  Calm, pleasant, excited to go home at the end of the visit.   HENT:      Head: Normocephalic and atraumatic.   Cardiovascular:      Rate and Rhythm: Normal rate and regular rhythm.      Heart sounds: Murmur heard.   Pulmonary:      Effort: Pulmonary effort is normal.      Breath sounds: Normal breath sounds. No stridor. No wheezing, rhonchi or rales.   Musculoskeletal:      Comments: Various phases of ecchymosis  predominantly on lateral femur, some on medial knee right leg.  Mild tenderness to palpation lateral hip, around distal femur, knee around right knee.  Mild global effusion around right knee joint.  1+ pitting edema right lower extremity to proximal shin.  No open wounds/ulcerations.  Discomfort elicited with passive ROM.  Unable to participate in strength testing.   Neurological:      General: No focal deficit present.      Mental Status: She is alert. Mental status is at baseline.      Comments: Very poor hearing, majority of communication through her daughter, limited speech.  Reportedly all at baseline.   Psychiatric:         Mood and Affect: Mood normal.

## 2023-05-23 NOTE — TELEPHONE ENCOUNTER
2:16 PM    Reason for Call: Phone Call    Description: continuation for home therapy/OT to eval and treat as indicated/cont with PT 2x a wk x 8 wks, to include theraputic exercise ,activity/date training. NMR, e stem and US     Was an appointment offered for this call? No  If yes : Appointment type              Date    Preferred method for responding to this message: Telephone Call  What is your phone number ?Mary/[phone]/please leave a voicemail if she doesn't answer    If we cannot reach you directly, may we leave a detailed response at the number you provided? yes    Can this message wait until your PCP/provider returns, if available today? Not applicable    Lawanda Dominguez

## 2023-06-20 NOTE — PROGRESS NOTES
Assessment & Plan     Bianca lives with her son Jamel who is her caregiver. Her daughter Selene is also very active in her care as a caregiver. Family is with her 24 hours a day. Bianca fell 5/18/2023 resulting in right hip pain. Her XRAY in the ER showed concerns for loosening hardware in her right hip. Bianca's hip has become more unstable and resulting in more frequent falls. Bianca was seen by Dr. Clancy for evaluation and in short family was told that nothing further could be done with Bianca given her age of 89 years old. To note Bianca had a hip fracture 4/27/2022 and had an arthoplasty done. Bianca did fairly well after surgery. Family is very concerned with Bianca falling and right hip instability. Physical therapy has also noted the instability and her hip just goes out at random. Thankfully, family is very attentive and have been able to break her fall for the most part.     Family would like a second opinion on right hip as it has really limited Bianca's mobility and quality of life. Family knows her age is a huge risk factor for any given surgical procedure. Family also understands the alternative risk of frequent falling and risk for fractures, head bleeds, etc.. Family wishes to talk with Dr. Nelson for a second opinion to see what their options are which I feel is very reasonable. Family isn't looking for heroic measures. They are hoping for comfort and stability of mobility to decrease fall risk for their mother.     Check a UA with frequent falls to assure no UTI.     She has taken 3 (1/2 tabs) of the Percocet prescription from Dr. Dick on 5/22/2023. She gets more disoriented with opioid use.       (T85.018D) Pain from implanted hardware, subsequent encounter  (primary encounter diagnosis)  Comment: Ref to Dr. Nelson for right hip instability and hardware failure  Plan: Orthopedic  Referral      (R29.6) Falls frequently  Comment: check UA   Plan: UA Macroscopic with  reflex to Microscopic and         Culture             See Patient Instructions    Return if symptoms worsen or fail to improve.    SANDRA Reza [facility]    Carolina Valenzuela is a 89 year old, presenting for the following health issues:    RECHECK        5/22/2023    10:52 AM   Additional Questions   Roomed by ROSEMARIE Renteria   Accompanied by Daughter, Vernell COLBY     Musculoskeletal problem/pain      Duration: ongoing     Description    Location: right hip    Intensity:  moderate    Accompanying signs and symptoms: none    History  Previous similar problem: YES  Previous evaluation:  x-ray    Precipitating or alleviating factors:  Trauma or overuse: YES- fall   Aggravating factors include: overuse    Therapies tried and outcome: ernie Valenzuela is able to ambulate with walker,but her right hip will go out randomly and cause her to fall.          Review of Systems   Constitutional, HEENT, cardiovascular, pulmonary, GI, , musculoskeletal, neuro, skin, endocrine and psych systems are negative, except as otherwise noted.      Objective    /70   Pulse 102   Temp 97  F (36.1  C) (Tympanic)   Wt 59.9 kg (132 lb)   SpO2 98%   BMI 25.78 kg/m    Body mass index is 25.78 kg/m .  Physical Exam   GENERAL: healthy, alert and no distress  RESP: lungs clear to auscultation - no rales, rhonchi or wheezes  CV: regular rate and rhythm, normal S1 S2, no S3 or S4, no click or rub, no peripheral edema and peripheral pulses strong. +Murmur  MS: no gross musculoskeletal defects noted, no edema. Seated in a wheelchair in exam room. Able to lift both legs and wiggle toes. Distal pulses intact to bilateral lower extremities. Able to step to the scale for weight and able to bear weight   SKIN: no suspicious lesions or rashes  PSYCH: mentation appears normal, affect normal/bright    PROCEDURE: XR PELVIS 1/2 VIEWS, XR FEMUR RIGHT 2 VIEWS 5/18/2023 9:16 AM     HISTORY:  pain     COMPARISONS: Left hip dated 1/17/2023.     TECHNIQUE: Single AP view of the pelvis. Additional AP and lateral  views of the right femur.     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 Inferior left  femoral component is not included in the exam.     No acute bony complication is seen.     There is some generalized osteopenia with degenerative change in the  lower lumbar spine.     No femur fracture is seen. However, there is some lucency around the  tip of the right femoral component and at the cement bone interface of  the proximal lateral femur on the right.                                                                        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     Findings worrisome for loosening of the right femoral component.     JAGDISH CELESTIN MD

## 2023-07-24 NOTE — TELEPHONE ENCOUNTER
Home care calling for continuing OT 1x a week for 4 weeks for self care therapeutic exercise, therapeutic activity, development of cognition.    unable to assess

## 2023-08-21 NOTE — PROGRESS NOTES
Assessment & Plan     (R30.0) Dysuria  Comment: check UA and labs  Plan: *UA reflex to Microscopic and Culture - [facility], Urine Microscopic, Comprehensive        metabolic panel, CBC with platelets            (W57.XXXA) Tick bite, initial encounter  Comment: check labs  Plan: Lyme Disease Annamaria with reflex to WB Serum,         Parasite stain            (R31.29) Microscopic hematuria  Comment: add culture  Plan: Urine Culture Aerobic Bacterial, Comprehensive         metabolic panel, CBC with platelets            See Patient Instructions    Return if symptoms worsen or fail to improve.    Mell Burrell NP  [facility]    Carolina Valenzuela is a 89 year old who presents for the following health issues  accompanied by her son:    HPI   Rule out uti *      Duration: couple weeks ago     Description (location/character/radiation): incontinence     Intensity:  moderate    Accompanying signs and symptoms: incontinence / confused (on and off) nocturia 20x per night. Wonders around a lot during night son states.      History (similar episodes/previous evaluation): None    Precipitating or alleviating factors: had a bad uti a few years ago that put her in the hospital so her son wanted to make sure she is okay.     Therapies tried and outcome: None    She was treated for a UTI recently and her son wants to make sure it is gone. She's had some urinary incontinence       Would also like a lymes test . Has pulled 5/6 ticks off her her this summer. She sleeps with a dog .       Review of Systems   Constitutional, HEENT, cardiovascular, pulmonary, GI, , musculoskeletal, neuro, skin, endocrine and psych systems are negative, except as otherwise noted. +Chilkat      Objective    BP (!) 150/78   Pulse 71   Temp 97.6  F (36.4  C) (Tympanic)   Wt 66.7 kg (147 lb)   SpO2 97%   BMI 28.71 kg/m    Body mass index is 28.71 kg/m .  Physical Exam   GENERAL: healthy, alert and no  Second request for refill   distress  NECK: no adenopathy, no asymmetry, masses, or scars and thyroid normal to palpation  RESP: lungs clear to auscultation - no rales, rhonchi or wheezes  CV: regular rate and rhythm, normal S1 S2, no S3 or S4, no murmur, click or rub, no peripheral edema and peripheral pulses strong  ABDOMEN: soft, nontender, no hepatosplenomegaly, no masses and bowel sounds normal  MS: no gross musculoskeletal defects noted, no edema. Negative bilateral CVA tenderness  SKIN: no suspicious lesions or rashes  NEURO: Normal strength and tone, mentation intact and speech normal  PSYCH: mentation appears normal, affect normal/bright    Results for orders placed or performed in visit on 07/20/21   *UA reflex to Microscopic and Culture - [facility]     Status: Abnormal    Specimen: Urine, Midstream   Result Value Ref Range    Color Urine Yellow Colorless, Straw, Light Yellow, Yellow    Appearance Urine Clear Clear    Glucose Urine Negative Negative mg/dL    Bilirubin Urine Negative Negative    Ketones Urine Negative Negative mg/dL    Specific Gravity Urine 1.015 1.003 - 1.035    Blood Urine Trace (A) Negative    pH Urine 6.0 5.0 - 7.0    Protein Albumin Urine Negative Negative mg/dL    Urobilinogen Urine 0.2 0.2, 1.0 E.U./dL    Nitrite Urine Negative Negative    Leukocyte Esterase Urine Negative Negative   Urine Microscopic     Status: Abnormal   Result Value Ref Range    Bacteria Urine Few (A) None Seen /HPF    RBC Urine None Seen 0-2 /HPF /HPF    WBC Urine 0-5 0-5 /HPF /HPF    Squamous Epithelials Urine Few (A) None Seen /LPF    Amorphous Crystals Urine Moderate (A) None Seen /HPF    Narrative    Urine Culture not indicated   Comprehensive metabolic panel     Status: Abnormal   Result Value Ref Range    Sodium 138 133 - 144 mmol/L    Potassium 4.6 3.4 - 5.3 mmol/L    Chloride 108 94 - 109 mmol/L    Carbon Dioxide (CO2) 20 20 - 32 mmol/L    Anion Gap 10 3 - 14 mmol/L    Urea Nitrogen 21 7 - 30 mg/dL    Creatinine 1.12 (H)  0.52 - 1.04 mg/dL    Calcium 9.5 8.5 - 10.1 mg/dL    Glucose 88 70 - 99 mg/dL    Alkaline Phosphatase 104 40 - 150 U/L    AST 18 0 - 45 U/L    ALT 17 0 - 50 U/L    Protein Total 7.4 6.8 - 8.8 g/dL    Albumin 3.5 3.4 - 5.0 g/dL    Bilirubin Total 0.5 0.2 - 1.3 mg/dL    GFR Estimate 43 (L) >60 mL/min/1.73m2   CBC with platelets     Status: Abnormal   Result Value Ref Range    WBC Count 5.1 4.0 - 11.0 10e3/uL    RBC Count 4.20 3.80 - 5.20 10e6/uL    Hemoglobin 11.1 (L) 11.7 - 15.7 g/dL    Hematocrit 34.2 (L) 35.0 - 47.0 %    MCV 81 78 - 100 fL    MCH 26.4 (L) 26.5 - 33.0 pg    MCHC 32.5 31.5 - 36.5 g/dL    RDW 15.9 (H) 10.0 - 15.0 %    Platelet Count 335 150 - 450 10e3/uL     Urine culture pending

## 2023-09-19 NOTE — PROGRESS NOTES
Thank you for this referral for Skilled nurse HHA.  This client was just discharged from home care skilled services 9/15/23 and has no further skilled need.  We do not provide just HHA services.  Please refer to another agency that provides HHA or PCA services.  [facility] is one agency that does.

## 2023-09-26 NOTE — PROGRESS NOTES
Chief complaint: Patient presents with:  Toenail: Thick, brown, and sensitive      History of Present Illness: This 89 year old female with dementia is seen at the request of No ref. provider found for evaluation and suggestions of management of thickened, dystrophic toenails. Her daughter and son take care of her and she lives with her son. She does not allow her children to trim her toenails. Her son and daughter have tried using a pumice stone and radames board in the past, but she no longer tolerates this.     Her daughter says her skin has been very dry for a long time. Her daughter has been using Eucerin cream a couple times a week but it doesn't seem to be helping.    She also has a painful corn on the RIGHT plantar midfoot. The patient dug out her own corn for years but she can't any longer. Her daughter would like that pared today.    Patient's history is supplemented by her daughter. Patient did not speak or provide her medical history thoruhgout this appointment.    No further pedal complaints today.         Pulse 92   Temp 96.9  F (36.1  C) (Tympanic)   SpO2 92%     Patient Active Problem List   Diagnosis    Venous stasis ulcer (H)    SNHL (sensorineural hearing loss)    Tinnitus    Impacted cerumen    Acute cystitis    Sepsis (H)    Senile nuclear sclerosis       Past Surgical History:   Procedure Laterality Date    EYE SURGERY  2010    Bilateral cataracts    HYSTERECTOMY      left hip replacement  2009    RIGHT hip replacement per pt    TONSILLECTOMY         Current Outpatient Medications   Medication    oxyCODONE-acetaminophen (PERCOCET) 5-325 MG tablet     No current facility-administered medications for this visit.        No Known Allergies    Family History   Problem Relation Age of Onset    Heart Disease Father         heart disease; cause of death    Dementia Mother 89        cause of death    Osteoporosis Mother     Cancer - colorectal Sister     Cancer Son 54        brain    Cancer Sister 62         leukemia; cause of death    Cerebrovascular Disease Son     Suicide Brother        Social History     Socioeconomic History    Marital status:      Spouse name: None    Number of children: None    Years of education: None    Highest education level: None   Tobacco Use    Smoking status: Never    Smokeless tobacco: Never   Substance and Sexual Activity    Alcohol use: No    Drug use: No    Sexual activity: Not Currently   Other Topics Concern    Caffeine Concern Yes     Comment: coffee 5 cups daily    Parent/sibling w/ CABG, MI or angioplasty before 65F 55M? No       ROS: 10 point ROS neg other than the symptoms noted above in the HPI.  EXAM  Constitutional: not fully alert to person, place or time, no distress    Psychiatric: mentation appears confused, unable to answer questions about her health history, history is supplemented by her daughter    VASCULAR:  -Dorsalis pedis pulse +2/4 b/l  -Posterior tibial pulse +2/4 b/l  -Capillary refill time < 3 seconds to b/l hallux  NEURO:  -Light touch sensation intact to b/l plantar forefoot  DERM:  -Skin temperature, texture and turgor WNL b/l  -Toenails elongated, significantly thickened, dystrophic and discolored x 10  -Diffusely dry skin and cracking skin (not through the skin layer) diffusely with persistent rubor and purple discoloration to the bilateral foot  -Hyperkeratotic lesion on the RIGHT plantar midfoot at the distal plantar third metatarsal   MSK:  -Muscle strength of ankles +5/5 for dorsiflexion, plantarflexion, ABDUction and ADDuction b/l    ============================================================    ASSESSMENT:  (L60.3) Onychodystrophy  (primary encounter diagnosis)    (L85.3) Xerosis of skin        PLAN:  -Patient evaluated and examined. Treatment options discussed with no educational barriers noted.    -Patient's history was fully provided by her daughter, Selene. Patient was unable to answer questions and she did not speak through the  appointment.    -Toenail debridement x 10 toenails without incident with significant reduction in thickness with the nail nippers and nail file    -Callus pared x 1 to the RIGHT plantar midfoot (third ray) without incident. Applied triple antibiotic ointment and a band aid to keep the corn soft. Patient's daughter can remove this and discontinue this tomorrow on 09/27/2023.  ---Patient reminded that the callus will likely return due to the underlying, prominent bone causing the callus while the patient is walking.    -Xerosis of skin:   ---Discussed xerosis of the skin including the risks of dry, cracking skin creating ulcerations on the feet. Areas of skin breakdown can break through the skin layer and cause pain or can become infected which can then potentially lead to life threatening wounds or amputation.  ---Dry skin occurs when there is not enough moisture in the outer layers of the skin. Multiple factors can contribute to this including climates with low humidity (including [place] winters), dehydration, using harsh soaps on the skin, frequent exposure to chlorinated water, frequent bathing, or employment that requires a lot of walking in outdoor environments or on very hard, cement surfaces.  ---Treatment of Xerosis can include application of lotion to the feet at least twice per day. Application of the lotion at night may be followed by the application of socks to prevent the lotion from rubbing off the foot on the floors or bedding. A pumice stone or Prabhu board may be used to remove excessive, flaking skin. Care should be taken not to be too aggressive and cause akin breakdown from scrubbing.  ---It is important to monitor and treat the feet daily to prevent the dry skin from starting to or from continuing to crack own. Patient expressed understanding and agreed with this plan.  -This is an acute, uncomplicated illness/injury with OTC treatment options reviewed.    -Patient in agreement with the above  treatment plan and all of patient's questions were answered.      Return to clinic 63+ days for toenail debridement      Margie Plummer DPM

## 2023-10-11 PROBLEM — S72.002A CLOSED LEFT HIP FRACTURE (H): Status: ACTIVE | Noted: 2022-04-27

## 2023-10-11 PROBLEM — G93.41 ACUTE METABOLIC ENCEPHALOPATHY: Status: ACTIVE | Noted: 2022-04-27

## 2023-10-11 PROBLEM — H91.93 BILATERAL HEARING LOSS: Chronic | Status: ACTIVE | Noted: 2022-04-28

## 2023-10-11 PROBLEM — F01.50 VASCULAR DEMENTIA WITHOUT BEHAVIORAL DISTURBANCE (H): Chronic | Status: ACTIVE | Noted: 2022-04-28

## 2023-10-11 PROBLEM — I10 HYPERTENSIVE DISORDER: Status: ACTIVE | Noted: 2022-04-27

## 2023-10-11 PROBLEM — I95.9 HYPOTENSION, UNSPECIFIED: Chronic | Status: ACTIVE | Noted: 2022-04-28

## 2023-10-11 NOTE — COMMUNITY RESOURCES LIST (ENGLISH)
10/11/2023   [facility]  N/A  For questions about this resource list or additional care needs, please contact your primary care clinic or care manager.  Phone: [phone]   Email: N/A   Address: [address] [place] [id number]   Hours: N/A        Financial Stability       Utility payment assistance  1  [employer]  Main Office - Energy Assistance Program Distance: 18.18 miles      Phone/Virtual   [address] [facility] [address] [place] [address]  Language: English  Hours: Mon - Thu 8:00 AM - 4:30 PM , Fri 8:00 AM - 12:00 PM  Fees: Free   Phone: (765) 276-8309 Email: radha@[employer] Website: http://www.[employer]          Important Numbers & Websites       Emergency Services   911  City Services   311  Poison Control   (835) 103-9290  Suicide Prevention Lifeline   (820) 704-4770 (TALK)  Child Abuse Hotline   (775) 857-9685 (4-A-Child)  Sexual Assault Hotline   (764) 697-2351 (HOPE)  National Runaway Safeline   (697) 888-3763 (RUNAWAY)  All-Options Talkline   (153) 480-5235  Substance Abuse Referral   (789) 860-2696 (HELP)

## 2023-10-11 NOTE — PROGRESS NOTES
Bianca is a 89 year old who is being evaluated via a billable video visit.      How would you like to obtain your AVS? Mail a copy  If the video visit is dropped, the invitation should be resent by: Text to cell phone: [phone]  Will anyone else be joining your video visit? Yes: . How would they like to receive their invitation? Text to cell phone: [phone]          Assessment & Plan     Bianca's mobility continues to decline. She use to be able to ambulate 3 laps in the house. Currently she ambulates 8 feet and needs to rest. She is seeing Dr. Nelson tomorrow for further evaluation. It will be determined if PT would be beneficial or make her symptoms worse.     (R29.6) Falls frequently  (primary encounter diagnosis)  Comment: FACE TO FACE for wheelchair   Plan: Wheelchair Order            (R26.89) Decreased mobility  Comment: FACE TO FACE for wheelchair   Plan: Wheelchair Order            See Patient Instructions    Return if symptoms worsen or fail to improve.    SANDRA Reza [facility]   Bianca is a 89 year old, presenting for the following health issues:  Clinic Care Coordination - Face To Face      HPI     DME (Durable Medical Equipment) Orders and Documentation  Orders Placed This Encounter   Procedures    Wheelchair Order      The patient was assessed and it was determined the patient is in need of the following listed DME Supplies/Equipment. Please complete supporting documentation below to demonstrate medical necessity.      Wheelchair Documentation  1. The patient has mobility limitations that impairs their ability to participate in one or more mobility related activities: Toileting, Grooming, and Bathing.  2. The patient's mobility limitations cannot be safely resolved by using a cane/walker:Yes  3. The patients home has adequate access to use a manual wheelchair:Yes  4. The use of a manual wheelchair on a regular basis will improve the  patients ability to participate in mobility related ADL's at home:Yes  5. The patient is willing to use a manual wheelchair at home:Yes  6. The patient has adequate upper body strength and the mental capability to safely use a manual wheelchair and/or has a caregiver that is able to assist: Yes  7. Does the patient have a lower extremity injury or edema?Yes    Reason for Type of Wheelchair  Patient weight: 0 lbs 0 oz  Light Weight Wheelchair: Patient is unable to self-propel a standard wheelchair in the home but can self propel a light weight wheelchair.    Use of a manual wheelchair will significantly improve the patient's ability to participate in MRADLs and the patient will use it on a regular basis in the home. The patient has not expressed an unwillingness to use the manual wheelchair that is provided in the home.    Review of Systems   Video visit.   Decreased mobility. Weakness. Increased risk to fall.       Objective           Vitals:  No vitals were obtained today due to virtual visit.    Physical Exam   GENERAL: Healthy, alert and no distress  EYES: Eyes grossly normal to inspection.  No discharge or erythema, or obvious scleral/conjunctival abnormalities.  RESP: No audible wheeze, cough, or visible cyanosis.  No visible retractions or increased work of breathing.    SKIN: Visible skin clear. No significant rash, abnormal pigmentation or lesions.  NEURO: Cranial nerves grossly intact.  Mentation and speech appropriate for age.  PSYCH: Mentation appears normal, affect normal/bright, judgement and insight intact, normal speech and appearance well-groomed.      Video-Visit Details    Type of service:  Video Visit   Video Start Time:  0955  Video End Time: 1004    Originating Location (pt. Location): Home    Distant Location (provider location):  On-site  Platform used for Video Visit: irisnote

## 2023-12-04 NOTE — PROGRESS NOTES
Chief complaint: Patient presents with:  Toenail: trimming      History of Present Illness: This 89 year old female with dementia is seen for follow-up management of thickened, dystrophic toenails. Her daughter and son take care of her and she lives with her son. She does not allow her children to trim her toenails.     Her daughter has been using Ammonium Lactate cream on the feet every other day and she says she thinks it is reducing the dry skin on her feet.    Patient's history is supplemented by her daughter. Patient did not speak or provide her medical history throughout this appointment.    No further pedal complaints today.       BP (!) 140/82 (BP Location: Left arm, Patient Position: Sitting, Cuff Size: Adult Regular)   Pulse 95   Temp (!) 96.7  F (35.9  C) (Tympanic)   SpO2 97%     Patient Active Problem List   Diagnosis    Venous stasis ulcer (H)    SNHL (sensorineural hearing loss)    Tinnitus    Impacted cerumen    Acute cystitis    Sepsis (H)    Senile nuclear sclerosis    Acute metabolic encephalopathy    Bilateral hearing loss    Closed left hip fracture (H)    Dementia without behavioral disturbance (H)    Hypotension, unspecified    Hypertensive disorder    Vascular dementia without behavioral disturbance (H)       Past Surgical History:   Procedure Laterality Date    EYE SURGERY  2010    Bilateral cataracts    HYSTERECTOMY      left hip replacement  2009    RIGHT hip replacement per pt    TONSILLECTOMY         Current Outpatient Medications   Medication    ammonium lactate (AMLACTIN) 12 % external cream     No current facility-administered medications for this visit.        No Known Allergies    Family History   Problem Relation Age of Onset    Heart Disease Father         heart disease; cause of death    Dementia Mother 89        cause of death    Osteoporosis Mother     Cancer - colorectal Sister     Cancer Son 54        brain    Cancer Sister 62        leukemia; cause of death     Cerebrovascular Disease Son     Suicide Brother        Social History     Socioeconomic History    Marital status:      Spouse name: None    Number of children: None    Years of education: None    Highest education level: None   Tobacco Use    Smoking status: Never    Smokeless tobacco: Never   Substance and Sexual Activity    Alcohol use: No    Drug use: No    Sexual activity: Not Currently   Other Topics Concern    Caffeine Concern Yes     Comment: coffee 5 cups daily    Parent/sibling w/ CABG, MI or angioplasty before 65F 55M? No       ROS: 10 point ROS neg other than the symptoms noted above in the HPI.  EXAM  Constitutional: not fully alert to person, place or time, no distress    Psychiatric: mentation appears confused, unable to answer questions about her health history, history is supplemented by her daughter    VASCULAR:  -Dorsalis pedis pulse +2/4 b/l  -Posterior tibial pulse +2/4 b/l  -Capillary refill time < 3 seconds to b/l hallux  NEURO:  -Light touch sensation intact to b/l plantar forefoot  DERM:  -Skin temperature, texture and turgor WNL b/l  -Toenails elongated, significantly thickened, dystrophic and discolored x 10  -Skin mildly dry to bilateral foot    MSK:  -Muscle strength of ankles +5/5 for dorsiflexion, plantarflexion, ABDUction and ADDuction b/l    ============================================================    ASSESSMENT:  (L60.3) Onychodystrophy  (primary encounter diagnosis)        PLAN:  -Patient evaluated and examined. Treatment options discussed with no educational barriers noted.    -Patient's history was fully provided by her daughter. Patient was unable to answer questions and she did not speak through the appointment.    -Toenail debridement x 10 toenails without incident with significant reduction in thickness with the nail nippers and nail file    -Xerosis of skin:   -Continue Ammonium Lactate cream on feet every other day (not between the toes). This is working well for  her.    -Patient in agreement with the above treatment plan and all of patient's questions were answered.      Return to clinic 63+ days for toenail debridement      Margie Plummer DPM

## 2024-01-01 ENCOUNTER — APPOINTMENT (OUTPATIENT)
Dept: CT IMAGING | Facility: HOSPITAL | Age: 89
DRG: 291 | End: 2024-01-01
Attending: EMERGENCY MEDICINE
Payer: MEDICARE

## 2024-01-01 ENCOUNTER — APPOINTMENT (OUTPATIENT)
Dept: PHYSICAL THERAPY | Facility: HOSPITAL | Age: 89
DRG: 291 | End: 2024-01-01
Payer: MEDICARE

## 2024-01-01 ENCOUNTER — APPOINTMENT (OUTPATIENT)
Dept: SPEECH THERAPY | Facility: HOSPITAL | Age: 89
DRG: 291 | End: 2024-01-01
Payer: MEDICARE

## 2024-01-01 ENCOUNTER — OFFICE VISIT (OUTPATIENT)
Dept: FAMILY MEDICINE | Facility: OTHER | Age: 89
End: 2024-01-01
Attending: NURSE PRACTITIONER
Payer: MEDICARE

## 2024-01-01 ENCOUNTER — APPOINTMENT (OUTPATIENT)
Dept: CT IMAGING | Facility: HOSPITAL | Age: 89
DRG: 291 | End: 2024-01-01
Attending: INTERNAL MEDICINE
Payer: MEDICARE

## 2024-01-01 ENCOUNTER — APPOINTMENT (OUTPATIENT)
Dept: CARDIOLOGY | Facility: HOSPITAL | Age: 89
DRG: 291 | End: 2024-01-01
Attending: INTERNAL MEDICINE
Payer: MEDICARE

## 2024-01-01 ENCOUNTER — TELEPHONE (OUTPATIENT)
Dept: FAMILY MEDICINE | Facility: OTHER | Age: 89
End: 2024-01-01

## 2024-01-01 ENCOUNTER — APPOINTMENT (OUTPATIENT)
Dept: GENERAL RADIOLOGY | Facility: HOSPITAL | Age: 89
DRG: 065 | End: 2024-01-01
Attending: STUDENT IN AN ORGANIZED HEALTH CARE EDUCATION/TRAINING PROGRAM
Payer: MEDICARE

## 2024-01-01 ENCOUNTER — MEDICAL CORRESPONDENCE (OUTPATIENT)
Dept: HEALTH INFORMATION MANAGEMENT | Facility: CLINIC | Age: 89
End: 2024-01-01

## 2024-01-01 ENCOUNTER — PATIENT OUTREACH (OUTPATIENT)
Dept: CARE COORDINATION | Facility: OTHER | Age: 89
End: 2024-01-01

## 2024-01-01 ENCOUNTER — APPOINTMENT (OUTPATIENT)
Dept: OCCUPATIONAL THERAPY | Facility: HOSPITAL | Age: 89
DRG: 291 | End: 2024-01-01
Attending: HOSPITALIST
Payer: MEDICARE

## 2024-01-01 ENCOUNTER — MEDICAL CORRESPONDENCE (OUTPATIENT)
Dept: HEALTH INFORMATION MANAGEMENT | Facility: HOSPITAL | Age: 89
End: 2024-01-01

## 2024-01-01 ENCOUNTER — HOSPITAL ENCOUNTER (INPATIENT)
Facility: HOSPITAL | Age: 89
LOS: 7 days | DRG: 065 | End: 2024-03-26
Attending: STUDENT IN AN ORGANIZED HEALTH CARE EDUCATION/TRAINING PROGRAM | Admitting: INTERNAL MEDICINE
Payer: MEDICARE

## 2024-01-01 ENCOUNTER — APPOINTMENT (OUTPATIENT)
Dept: GENERAL RADIOLOGY | Facility: HOSPITAL | Age: 89
DRG: 291 | End: 2024-01-01
Attending: HOSPITALIST
Payer: MEDICARE

## 2024-01-01 ENCOUNTER — APPOINTMENT (OUTPATIENT)
Dept: PHYSICAL THERAPY | Facility: HOSPITAL | Age: 89
DRG: 291 | End: 2024-01-01
Attending: HOSPITALIST
Payer: MEDICARE

## 2024-01-01 ENCOUNTER — APPOINTMENT (OUTPATIENT)
Dept: MRI IMAGING | Facility: HOSPITAL | Age: 89
DRG: 065 | End: 2024-01-01
Attending: INTERNAL MEDICINE
Payer: MEDICARE

## 2024-01-01 ENCOUNTER — APPOINTMENT (OUTPATIENT)
Dept: CT IMAGING | Facility: HOSPITAL | Age: 89
DRG: 065 | End: 2024-01-01
Attending: STUDENT IN AN ORGANIZED HEALTH CARE EDUCATION/TRAINING PROGRAM
Payer: MEDICARE

## 2024-01-01 ENCOUNTER — APPOINTMENT (OUTPATIENT)
Dept: GENERAL RADIOLOGY | Facility: HOSPITAL | Age: 89
DRG: 291 | End: 2024-01-01
Attending: INTERNAL MEDICINE
Payer: MEDICARE

## 2024-01-01 ENCOUNTER — LAB (OUTPATIENT)
Dept: LAB | Facility: OTHER | Age: 89
End: 2024-01-01
Payer: MEDICARE

## 2024-01-01 ENCOUNTER — APPOINTMENT (OUTPATIENT)
Dept: CT IMAGING | Facility: HOSPITAL | Age: 89
DRG: 065 | End: 2024-01-01
Attending: INTERNAL MEDICINE
Payer: MEDICARE

## 2024-01-01 ENCOUNTER — HOSPITAL ENCOUNTER (INPATIENT)
Facility: HOSPITAL | Age: 89
LOS: 4 days | Discharge: HOME | DRG: 291 | End: 2024-02-01
Attending: EMERGENCY MEDICINE | Admitting: HOSPITALIST
Payer: MEDICARE

## 2024-01-01 ENCOUNTER — MYC MEDICAL ADVICE (OUTPATIENT)
Dept: FAMILY MEDICINE | Facility: OTHER | Age: 89
End: 2024-01-01

## 2024-01-01 ENCOUNTER — HOSPITAL ENCOUNTER (EMERGENCY)
Facility: HOSPITAL | Age: 89
Discharge: HOME | End: 2024-01-23
Attending: STUDENT IN AN ORGANIZED HEALTH CARE EDUCATION/TRAINING PROGRAM | Admitting: STUDENT IN AN ORGANIZED HEALTH CARE EDUCATION/TRAINING PROGRAM
Payer: MEDICARE

## 2024-01-01 ENCOUNTER — APPOINTMENT (OUTPATIENT)
Dept: CT IMAGING | Facility: HOSPITAL | Age: 89
End: 2024-01-01
Attending: STUDENT IN AN ORGANIZED HEALTH CARE EDUCATION/TRAINING PROGRAM
Payer: MEDICARE

## 2024-01-01 VITALS
RESPIRATION RATE: 18 BRPM | TEMPERATURE: 98.4 F | OXYGEN SATURATION: 96 % | DIASTOLIC BLOOD PRESSURE: 79 MMHG | BODY MASS INDEX: 23.32 KG/M2 | HEIGHT: 63 IN | WEIGHT: 131.61 LBS | SYSTOLIC BLOOD PRESSURE: 148 MMHG | HEART RATE: 78 BPM

## 2024-01-01 VITALS
OXYGEN SATURATION: 97 % | SYSTOLIC BLOOD PRESSURE: 130 MMHG | HEART RATE: 75 BPM | DIASTOLIC BLOOD PRESSURE: 86 MMHG | TEMPERATURE: 97.9 F

## 2024-01-01 VITALS
DIASTOLIC BLOOD PRESSURE: 89 MMHG | SYSTOLIC BLOOD PRESSURE: 140 MMHG | TEMPERATURE: 99.9 F | HEART RATE: 92 BPM | OXYGEN SATURATION: 95 % | RESPIRATION RATE: 22 BRPM

## 2024-01-01 VITALS
HEART RATE: 83 BPM | BODY MASS INDEX: 21.52 KG/M2 | SYSTOLIC BLOOD PRESSURE: 139 MMHG | WEIGHT: 121.47 LBS | TEMPERATURE: 101 F | OXYGEN SATURATION: 87 % | DIASTOLIC BLOOD PRESSURE: 88 MMHG | RESPIRATION RATE: 20 BRPM

## 2024-01-01 DIAGNOSIS — Z09 HOSPITAL DISCHARGE FOLLOW-UP: ICD-10-CM

## 2024-01-01 DIAGNOSIS — G93.41 SEPTIC ENCEPHALOPATHY: ICD-10-CM

## 2024-01-01 DIAGNOSIS — F03.911 DEMENTIA WITH AGITATION, UNSPECIFIED DEMENTIA SEVERITY, UNSPECIFIED DEMENTIA TYPE (H): ICD-10-CM

## 2024-01-01 DIAGNOSIS — R30.0 DYSURIA: ICD-10-CM

## 2024-01-01 DIAGNOSIS — K59.00 CONSTIPATION, UNSPECIFIED CONSTIPATION TYPE: ICD-10-CM

## 2024-01-01 DIAGNOSIS — F03.911 DEMENTIA WITH AGITATION, UNSPECIFIED DEMENTIA SEVERITY, UNSPECIFIED DEMENTIA TYPE (H): Primary | ICD-10-CM

## 2024-01-01 DIAGNOSIS — N30.01 ACUTE CYSTITIS WITH HEMATURIA: ICD-10-CM

## 2024-01-01 DIAGNOSIS — R41.0 DELIRIUM: ICD-10-CM

## 2024-01-01 DIAGNOSIS — I10 PRIMARY HYPERTENSION: Primary | ICD-10-CM

## 2024-01-01 DIAGNOSIS — R31.9 URINARY TRACT INFECTION WITH HEMATURIA, SITE UNSPECIFIED: ICD-10-CM

## 2024-01-01 DIAGNOSIS — I10 PRIMARY HYPERTENSION: ICD-10-CM

## 2024-01-01 DIAGNOSIS — N39.0 URINARY TRACT INFECTION IN ELDERLY PATIENT: ICD-10-CM

## 2024-01-01 DIAGNOSIS — R30.0 DYSURIA: Primary | ICD-10-CM

## 2024-01-01 DIAGNOSIS — N39.0 URINARY TRACT INFECTION WITH HEMATURIA, SITE UNSPECIFIED: ICD-10-CM

## 2024-01-01 DIAGNOSIS — K56.41 FECAL IMPACTION (H): ICD-10-CM

## 2024-01-01 DIAGNOSIS — K59.01 SLOW TRANSIT CONSTIPATION: Primary | ICD-10-CM

## 2024-01-01 DIAGNOSIS — R40.1 STUPOR: ICD-10-CM

## 2024-01-01 DIAGNOSIS — Z53.9 PERSONS ENCOUNTERING HEALTH SERVICES FOR SPECIFIC PROCEDURES, NOT CARRIED OUT: Primary | ICD-10-CM

## 2024-01-01 LAB
ALBUMIN SERPL BCG-MCNC: 3 G/DL (ref 3.5–5.2)
ALBUMIN SERPL BCG-MCNC: 3 G/DL (ref 3.5–5.2)
ALBUMIN SERPL BCG-MCNC: 3.1 G/DL (ref 3.5–5.2)
ALBUMIN SERPL BCG-MCNC: 3.2 G/DL (ref 3.5–5.2)
ALBUMIN SERPL BCG-MCNC: 3.2 G/DL (ref 3.5–5.2)
ALBUMIN SERPL BCG-MCNC: 3.3 G/DL (ref 3.5–5.2)
ALBUMIN SERPL BCG-MCNC: 3.6 G/DL (ref 3.5–5.2)
ALBUMIN SERPL BCG-MCNC: 3.8 G/DL (ref 3.5–5.2)
ALBUMIN SERPL BCG-MCNC: 3.9 G/DL (ref 3.5–5.2)
ALBUMIN UR-MCNC: 10 MG/DL
ALBUMIN UR-MCNC: 100 MG/DL
ALBUMIN UR-MCNC: NEGATIVE MG/DL
ALLEN'S TEST: YES
ALP SERPL-CCNC: 103 U/L (ref 40–150)
ALP SERPL-CCNC: 108 U/L (ref 40–150)
ALP SERPL-CCNC: 122 U/L (ref 40–150)
ALP SERPL-CCNC: 129 U/L (ref 40–150)
ALT SERPL W P-5'-P-CCNC: 11 U/L (ref 0–50)
ALT SERPL W P-5'-P-CCNC: 7 U/L (ref 0–50)
ALT SERPL W P-5'-P-CCNC: 8 U/L (ref 0–50)
ALT SERPL W P-5'-P-CCNC: 9 U/L (ref 0–50)
AMMONIA PLAS-SCNC: 11 UMOL/L (ref 11–51)
AMMONIA PLAS-SCNC: <10 UMOL/L (ref 11–51)
ANION GAP SERPL CALCULATED.3IONS-SCNC: 10 MMOL/L (ref 7–15)
ANION GAP SERPL CALCULATED.3IONS-SCNC: 10 MMOL/L (ref 7–15)
ANION GAP SERPL CALCULATED.3IONS-SCNC: 11 MMOL/L (ref 7–15)
ANION GAP SERPL CALCULATED.3IONS-SCNC: 11 MMOL/L (ref 7–15)
ANION GAP SERPL CALCULATED.3IONS-SCNC: 12 MMOL/L (ref 7–15)
ANION GAP SERPL CALCULATED.3IONS-SCNC: 15 MMOL/L (ref 7–15)
ANION GAP SERPL CALCULATED.3IONS-SCNC: 8 MMOL/L (ref 7–15)
ANION GAP SERPL CALCULATED.3IONS-SCNC: 8 MMOL/L (ref 7–15)
ANION GAP SERPL CALCULATED.3IONS-SCNC: 9 MMOL/L (ref 7–15)
APPEARANCE UR: ABNORMAL
AST SERPL W P-5'-P-CCNC: 16 U/L (ref 0–45)
AST SERPL W P-5'-P-CCNC: 20 U/L (ref 0–45)
AST SERPL W P-5'-P-CCNC: 21 U/L (ref 0–45)
AST SERPL W P-5'-P-CCNC: 25 U/L (ref 0–45)
ATRIAL RATE - MUSE: NORMAL BPM
ATRIAL RATE - MUSE: NORMAL BPM
B-OH-BUTYR SERPL-SCNC: 0.3 MMOL/L
BACTERIA #/AREA URNS HPF: ABNORMAL /HPF
BACTERIA BLD CULT: NO GROWTH
BACTERIA BLD CULT: NO GROWTH
BACTERIA UR CULT: ABNORMAL
BACTERIA UR CULT: ABNORMAL
BACTERIA UR CULT: NO GROWTH
BASE EXCESS BLDA CALC-SCNC: -0.6 MMOL/L (ref -3–3)
BASE EXCESS BLDV CALC-SCNC: -1.5 MMOL/L (ref -3–3)
BASE EXCESS BLDV CALC-SCNC: -1.8 MMOL/L (ref -3–3)
BASE EXCESS BLDV CALC-SCNC: 0.3 MMOL/L (ref -3–3)
BASOPHILS # BLD AUTO: 0 10E3/UL (ref 0–0.2)
BASOPHILS # BLD AUTO: 0.1 10E3/UL (ref 0–0.2)
BASOPHILS NFR BLD AUTO: 1 %
BILIRUB DIRECT SERPL-MCNC: <0.2 MG/DL (ref 0–0.3)
BILIRUB SERPL-MCNC: 0.3 MG/DL
BILIRUB SERPL-MCNC: 0.3 MG/DL
BILIRUB SERPL-MCNC: 0.4 MG/DL
BILIRUB SERPL-MCNC: 0.6 MG/DL
BILIRUB UR QL STRIP: NEGATIVE
BUN SERPL-MCNC: 10.1 MG/DL (ref 8–23)
BUN SERPL-MCNC: 11.8 MG/DL (ref 8–23)
BUN SERPL-MCNC: 12.1 MG/DL (ref 8–23)
BUN SERPL-MCNC: 12.5 MG/DL (ref 8–23)
BUN SERPL-MCNC: 12.9 MG/DL (ref 8–23)
BUN SERPL-MCNC: 13.4 MG/DL (ref 8–23)
BUN SERPL-MCNC: 13.6 MG/DL (ref 8–23)
BUN SERPL-MCNC: 14.2 MG/DL (ref 8–23)
BUN SERPL-MCNC: 15.2 MG/DL (ref 8–23)
BUN SERPL-MCNC: 16.1 MG/DL (ref 8–23)
BUN SERPL-MCNC: 16.7 MG/DL (ref 8–23)
BUN SERPL-MCNC: 17.4 MG/DL (ref 8–23)
BUN SERPL-MCNC: 18.1 MG/DL (ref 8–23)
BUN SERPL-MCNC: 19.4 MG/DL (ref 8–23)
BUN SERPL-MCNC: 9 MG/DL (ref 8–23)
CALCIUM SERPL-MCNC: 10.1 MG/DL (ref 8.2–9.6)
CALCIUM SERPL-MCNC: 8.7 MG/DL (ref 8.2–9.6)
CALCIUM SERPL-MCNC: 9.1 MG/DL (ref 8.2–9.6)
CALCIUM SERPL-MCNC: 9.1 MG/DL (ref 8.2–9.6)
CALCIUM SERPL-MCNC: 9.2 MG/DL (ref 8.2–9.6)
CALCIUM SERPL-MCNC: 9.3 MG/DL (ref 8.2–9.6)
CALCIUM SERPL-MCNC: 9.4 MG/DL (ref 8.2–9.6)
CALCIUM SERPL-MCNC: 9.5 MG/DL (ref 8.2–9.6)
CALCIUM SERPL-MCNC: 9.5 MG/DL (ref 8.2–9.6)
CALCIUM SERPL-MCNC: 9.6 MG/DL (ref 8.2–9.6)
CALCIUM SERPL-MCNC: 9.9 MG/DL (ref 8.2–9.6)
CHLORIDE SERPL-SCNC: 103 MMOL/L (ref 98–107)
CHLORIDE SERPL-SCNC: 104 MMOL/L (ref 98–107)
CHLORIDE SERPL-SCNC: 105 MMOL/L (ref 98–107)
CHLORIDE SERPL-SCNC: 106 MMOL/L (ref 98–107)
CHLORIDE SERPL-SCNC: 107 MMOL/L (ref 98–107)
CHLORIDE SERPL-SCNC: 107 MMOL/L (ref 98–107)
CHLORIDE SERPL-SCNC: 109 MMOL/L (ref 98–107)
CHLORIDE SERPL-SCNC: 109 MMOL/L (ref 98–107)
CK SERPL-CCNC: 60 U/L (ref 26–192)
COHGB MFR BLD: 97.4 % (ref 95–96)
COLOR UR AUTO: ABNORMAL
COLOR UR AUTO: YELLOW
COLOR UR AUTO: YELLOW
CREAT SERPL-MCNC: 0.84 MG/DL (ref 0.51–0.95)
CREAT SERPL-MCNC: 0.85 MG/DL (ref 0.51–0.95)
CREAT SERPL-MCNC: 0.88 MG/DL (ref 0.51–0.95)
CREAT SERPL-MCNC: 0.88 MG/DL (ref 0.51–0.95)
CREAT SERPL-MCNC: 0.9 MG/DL (ref 0.51–0.95)
CREAT SERPL-MCNC: 0.9 MG/DL (ref 0.51–0.95)
CREAT SERPL-MCNC: 0.93 MG/DL (ref 0.51–0.95)
CREAT SERPL-MCNC: 0.93 MG/DL (ref 0.51–0.95)
CREAT SERPL-MCNC: 0.94 MG/DL (ref 0.51–0.95)
CREAT SERPL-MCNC: 1 MG/DL (ref 0.51–0.95)
CREAT SERPL-MCNC: 1 MG/DL (ref 0.51–0.95)
CREAT SERPL-MCNC: 1.07 MG/DL (ref 0.51–0.95)
CREAT SERPL-MCNC: 1.14 MG/DL (ref 0.51–0.95)
CRP SERPL-MCNC: 22.55 MG/L
D DIMER PPP FEU-MCNC: 1.35 UG/ML FEU (ref 0–0.5)
DEPRECATED HCO3 PLAS-SCNC: 17 MMOL/L (ref 22–29)
DEPRECATED HCO3 PLAS-SCNC: 19 MMOL/L (ref 22–29)
DEPRECATED HCO3 PLAS-SCNC: 20 MMOL/L (ref 22–29)
DEPRECATED HCO3 PLAS-SCNC: 21 MMOL/L (ref 22–29)
DEPRECATED HCO3 PLAS-SCNC: 22 MMOL/L (ref 22–29)
DEPRECATED HCO3 PLAS-SCNC: 23 MMOL/L (ref 22–29)
DEPRECATED HCO3 PLAS-SCNC: 23 MMOL/L (ref 22–29)
DEPRECATED HCO3 PLAS-SCNC: 24 MMOL/L (ref 22–29)
DIASTOLIC BLOOD PRESSURE - MUSE: NORMAL MMHG
DIASTOLIC BLOOD PRESSURE - MUSE: NORMAL MMHG
EGFRCR SERPLBLD CKD-EPI 2021: 45 ML/MIN/1.73M2
EGFRCR SERPLBLD CKD-EPI 2021: 48 ML/MIN/1.73M2
EGFRCR SERPLBLD CKD-EPI 2021: 52 ML/MIN/1.73M2
EGFRCR SERPLBLD CKD-EPI 2021: 52 ML/MIN/1.73M2
EGFRCR SERPLBLD CKD-EPI 2021: 56 ML/MIN/1.73M2
EGFRCR SERPLBLD CKD-EPI 2021: 57 ML/MIN/1.73M2
EGFRCR SERPLBLD CKD-EPI 2021: 57 ML/MIN/1.73M2
EGFRCR SERPLBLD CKD-EPI 2021: 59 ML/MIN/1.73M2
EGFRCR SERPLBLD CKD-EPI 2021: 59 ML/MIN/1.73M2
EGFRCR SERPLBLD CKD-EPI 2021: 61 ML/MIN/1.73M2
EGFRCR SERPLBLD CKD-EPI 2021: 61 ML/MIN/1.73M2
EGFRCR SERPLBLD CKD-EPI 2021: 64 ML/MIN/1.73M2
EGFRCR SERPLBLD CKD-EPI 2021: 64 ML/MIN/1.73M2
EOSINOPHIL # BLD AUTO: 0.1 10E3/UL (ref 0–0.7)
EOSINOPHIL # BLD AUTO: 0.2 10E3/UL (ref 0–0.7)
EOSINOPHIL NFR BLD AUTO: 1 %
EOSINOPHIL NFR BLD AUTO: 2 %
EOSINOPHIL NFR BLD AUTO: 3 %
EOSINOPHIL NFR BLD AUTO: 3 %
ERYTHROCYTE [DISTWIDTH] IN BLOOD BY AUTOMATED COUNT: 21.1 % (ref 10–15)
ERYTHROCYTE [DISTWIDTH] IN BLOOD BY AUTOMATED COUNT: 21.2 % (ref 10–15)
ERYTHROCYTE [DISTWIDTH] IN BLOOD BY AUTOMATED COUNT: 21.3 % (ref 10–15)
ERYTHROCYTE [DISTWIDTH] IN BLOOD BY AUTOMATED COUNT: 21.4 % (ref 10–15)
ERYTHROCYTE [DISTWIDTH] IN BLOOD BY AUTOMATED COUNT: 21.6 % (ref 10–15)
ERYTHROCYTE [DISTWIDTH] IN BLOOD BY AUTOMATED COUNT: 21.7 % (ref 10–15)
ERYTHROCYTE [DISTWIDTH] IN BLOOD BY AUTOMATED COUNT: 21.8 % (ref 10–15)
ERYTHROCYTE [DISTWIDTH] IN BLOOD BY AUTOMATED COUNT: 22.5 % (ref 10–15)
ETHANOL SERPL-MCNC: <0.01 G/DL
FERRITIN SERPL-MCNC: 71 NG/ML (ref 11–328)
FLUAV RNA SPEC QL NAA+PROBE: NEGATIVE
FLUBV RNA RESP QL NAA+PROBE: NEGATIVE
GLUCOSE BLDC GLUCOMTR-MCNC: 101 MG/DL (ref 70–99)
GLUCOSE BLDC GLUCOMTR-MCNC: 101 MG/DL (ref 70–99)
GLUCOSE BLDC GLUCOMTR-MCNC: 193 MG/DL (ref 70–99)
GLUCOSE BLDC GLUCOMTR-MCNC: 50 MG/DL (ref 70–99)
GLUCOSE SERPL-MCNC: 100 MG/DL (ref 70–99)
GLUCOSE SERPL-MCNC: 100 MG/DL (ref 70–99)
GLUCOSE SERPL-MCNC: 101 MG/DL (ref 70–99)
GLUCOSE SERPL-MCNC: 117 MG/DL (ref 70–99)
GLUCOSE SERPL-MCNC: 122 MG/DL (ref 70–99)
GLUCOSE SERPL-MCNC: 131 MG/DL (ref 70–99)
GLUCOSE SERPL-MCNC: 157 MG/DL (ref 70–99)
GLUCOSE SERPL-MCNC: 71 MG/DL (ref 70–99)
GLUCOSE SERPL-MCNC: 84 MG/DL (ref 70–99)
GLUCOSE SERPL-MCNC: 85 MG/DL (ref 70–99)
GLUCOSE SERPL-MCNC: 86 MG/DL (ref 70–99)
GLUCOSE SERPL-MCNC: 87 MG/DL (ref 70–99)
GLUCOSE SERPL-MCNC: 92 MG/DL (ref 70–99)
GLUCOSE SERPL-MCNC: 94 MG/DL (ref 70–99)
GLUCOSE SERPL-MCNC: 99 MG/DL (ref 70–99)
GLUCOSE UR STRIP-MCNC: NEGATIVE MG/DL
HCO3 BLD-SCNC: 22 MMOL/L (ref 21–28)
HCO3 BLDV-SCNC: 21 MMOL/L (ref 21–28)
HCO3 BLDV-SCNC: 22 MMOL/L (ref 21–28)
HCO3 BLDV-SCNC: 26 MMOL/L (ref 21–28)
HCT VFR BLD AUTO: 30.9 % (ref 35–47)
HCT VFR BLD AUTO: 31.2 % (ref 35–47)
HCT VFR BLD AUTO: 31.4 % (ref 35–47)
HCT VFR BLD AUTO: 32.3 % (ref 35–47)
HCT VFR BLD AUTO: 32.7 % (ref 35–47)
HCT VFR BLD AUTO: 33.5 % (ref 35–47)
HCT VFR BLD AUTO: 34 % (ref 35–47)
HCT VFR BLD AUTO: 34.1 % (ref 35–47)
HCT VFR BLD AUTO: 34.6 % (ref 35–47)
HCT VFR BLD AUTO: 34.9 % (ref 35–47)
HCT VFR BLD AUTO: 35.5 % (ref 35–47)
HCT VFR BLD AUTO: 36.2 % (ref 35–47)
HCT VFR BLD AUTO: 37.4 % (ref 35–47)
HCT VFR BLD AUTO: 44.4 % (ref 35–47)
HGB BLD-MCNC: 10 G/DL (ref 11.7–15.7)
HGB BLD-MCNC: 10 G/DL (ref 11.7–15.7)
HGB BLD-MCNC: 10.1 G/DL (ref 11.7–15.7)
HGB BLD-MCNC: 10.4 G/DL (ref 11.7–15.7)
HGB BLD-MCNC: 10.5 G/DL (ref 11.7–15.7)
HGB BLD-MCNC: 10.7 G/DL (ref 11.7–15.7)
HGB BLD-MCNC: 10.9 G/DL (ref 11.7–15.7)
HGB BLD-MCNC: 11.1 G/DL (ref 11.7–15.7)
HGB BLD-MCNC: 13.5 G/DL (ref 11.7–15.7)
HGB BLD-MCNC: 9.2 G/DL (ref 11.7–15.7)
HGB BLD-MCNC: 9.3 G/DL (ref 11.7–15.7)
HGB BLD-MCNC: 9.6 G/DL (ref 11.7–15.7)
HGB UR QL STRIP: ABNORMAL
HGB UR QL STRIP: ABNORMAL
HGB UR QL STRIP: NEGATIVE
HOLD SPECIMEN: NORMAL
HYALINE CASTS: 4 /LPF
IMM GRANULOCYTES # BLD: 0 10E3/UL
IMM GRANULOCYTES NFR BLD: 0 %
IMM GRANULOCYTES NFR BLD: 1 %
INTERPRETATION ECG - MUSE: NORMAL
INTERPRETATION ECG - MUSE: NORMAL
IRON BINDING CAPACITY (ROCHE): 326 UG/DL (ref 240–430)
IRON SATN MFR SERPL: 9 % (ref 15–46)
IRON SERPL-MCNC: 30 UG/DL (ref 37–145)
KETONES UR STRIP-MCNC: ABNORMAL MG/DL
KETONES UR STRIP-MCNC: NEGATIVE MG/DL
KETONES UR STRIP-MCNC: NEGATIVE MG/DL
LACTATE SERPL-SCNC: 0.7 MMOL/L (ref 0.7–2)
LACTATE SERPL-SCNC: 1 MMOL/L (ref 0.7–2)
LACTATE SERPL-SCNC: 1.1 MMOL/L (ref 0.7–2)
LACTATE SERPL-SCNC: 1.6 MMOL/L (ref 0.7–2)
LACTATE SERPL-SCNC: 1.8 MMOL/L (ref 0.7–2)
LACTATE SERPL-SCNC: 1.9 MMOL/L (ref 0.7–2)
LACTATE SERPL-SCNC: 1.9 MMOL/L (ref 0.7–2)
LEUKOCYTE ESTERASE UR QL STRIP: ABNORMAL
LEUKOCYTE ESTERASE UR QL STRIP: ABNORMAL
LEUKOCYTE ESTERASE UR QL STRIP: NEGATIVE
LIPASE SERPL-CCNC: 9 U/L (ref 13–60)
LVEF ECHO: NORMAL
LYMPHOCYTES # BLD AUTO: 1 10E3/UL (ref 0.8–5.3)
LYMPHOCYTES # BLD AUTO: 1.1 10E3/UL (ref 0.8–5.3)
LYMPHOCYTES # BLD AUTO: 1.2 10E3/UL (ref 0.8–5.3)
LYMPHOCYTES # BLD AUTO: 1.2 10E3/UL (ref 0.8–5.3)
LYMPHOCYTES NFR BLD AUTO: 13 %
LYMPHOCYTES NFR BLD AUTO: 20 %
LYMPHOCYTES NFR BLD AUTO: 22 %
LYMPHOCYTES NFR BLD AUTO: 28 %
MAGNESIUM SERPL-MCNC: 2.1 MG/DL (ref 1.7–2.3)
MCH RBC QN AUTO: 21.3 PG (ref 26.5–33)
MCH RBC QN AUTO: 21.4 PG (ref 26.5–33)
MCH RBC QN AUTO: 21.4 PG (ref 26.5–33)
MCH RBC QN AUTO: 21.6 PG (ref 26.5–33)
MCH RBC QN AUTO: 21.6 PG (ref 26.5–33)
MCH RBC QN AUTO: 21.7 PG (ref 26.5–33)
MCH RBC QN AUTO: 21.9 PG (ref 26.5–33)
MCH RBC QN AUTO: 22.9 PG (ref 26.5–33)
MCH RBC QN AUTO: 22.9 PG (ref 26.5–33)
MCH RBC QN AUTO: 23.1 PG (ref 26.5–33)
MCH RBC QN AUTO: 23.1 PG (ref 26.5–33)
MCH RBC QN AUTO: 23.2 PG (ref 26.5–33)
MCHC RBC AUTO-ENTMCNC: 29.6 G/DL (ref 31.5–36.5)
MCHC RBC AUTO-ENTMCNC: 29.7 G/DL (ref 31.5–36.5)
MCHC RBC AUTO-ENTMCNC: 29.8 G/DL (ref 31.5–36.5)
MCHC RBC AUTO-ENTMCNC: 29.8 G/DL (ref 31.5–36.5)
MCHC RBC AUTO-ENTMCNC: 29.9 G/DL (ref 31.5–36.5)
MCHC RBC AUTO-ENTMCNC: 30.1 G/DL (ref 31.5–36.5)
MCHC RBC AUTO-ENTMCNC: 30.4 G/DL (ref 31.5–36.5)
MCHC RBC AUTO-ENTMCNC: 30.6 G/DL (ref 31.5–36.5)
MCHC RBC AUTO-ENTMCNC: 30.6 G/DL (ref 31.5–36.5)
MCHC RBC AUTO-ENTMCNC: 30.8 G/DL (ref 31.5–36.5)
MCHC RBC AUTO-ENTMCNC: 30.8 G/DL (ref 31.5–36.5)
MCHC RBC AUTO-ENTMCNC: 31.3 G/DL (ref 31.5–36.5)
MCV RBC AUTO: 71 FL (ref 78–100)
MCV RBC AUTO: 72 FL (ref 78–100)
MCV RBC AUTO: 73 FL (ref 78–100)
MCV RBC AUTO: 73 FL (ref 78–100)
MCV RBC AUTO: 74 FL (ref 78–100)
MCV RBC AUTO: 75 FL (ref 78–100)
MCV RBC AUTO: 75 FL (ref 78–100)
MCV RBC AUTO: 76 FL (ref 78–100)
MCV RBC AUTO: 76 FL (ref 78–100)
MONOCYTES # BLD AUTO: 0.3 10E3/UL (ref 0–1.3)
MONOCYTES # BLD AUTO: 0.4 10E3/UL (ref 0–1.3)
MONOCYTES # BLD AUTO: 0.4 10E3/UL (ref 0–1.3)
MONOCYTES # BLD AUTO: 0.5 10E3/UL (ref 0–1.3)
MONOCYTES NFR BLD AUTO: 6 %
MONOCYTES NFR BLD AUTO: 7 %
MUCOUS THREADS #/AREA URNS LPF: PRESENT /LPF
MUCOUS THREADS #/AREA URNS LPF: PRESENT /LPF
NEUTROPHILS # BLD AUTO: 2.6 10E3/UL (ref 1.6–8.3)
NEUTROPHILS # BLD AUTO: 3.3 10E3/UL (ref 1.6–8.3)
NEUTROPHILS # BLD AUTO: 3.7 10E3/UL (ref 1.6–8.3)
NEUTROPHILS # BLD AUTO: 6.6 10E3/UL (ref 1.6–8.3)
NEUTROPHILS NFR BLD AUTO: 61 %
NEUTROPHILS NFR BLD AUTO: 67 %
NEUTROPHILS NFR BLD AUTO: 69 %
NEUTROPHILS NFR BLD AUTO: 78 %
NITRATE UR QL: NEGATIVE
NITRATE UR QL: NEGATIVE
NITRATE UR QL: POSITIVE
NRBC # BLD AUTO: 0 10E3/UL
NRBC BLD AUTO-RTO: 0 /100
NT-PROBNP SERPL-MCNC: 2294 PG/ML (ref 0–1800)
NT-PROBNP SERPL-MCNC: 3185 PG/ML (ref 0–1800)
NT-PROBNP SERPL-MCNC: 3487 PG/ML (ref 0–1800)
NT-PROBNP SERPL-MCNC: 9070 PG/ML (ref 0–1800)
O2/TOTAL GAS SETTING VFR VENT: 21 %
O2/TOTAL GAS SETTING VFR VENT: 32 %
OXYHGB MFR BLDV: 52 % (ref 70–75)
OXYHGB MFR BLDV: 84 % (ref 70–75)
OXYHGB MFR BLDV: 95 % (ref 70–75)
P AXIS - MUSE: NORMAL DEGREES
P AXIS - MUSE: NORMAL DEGREES
PCO2 BLD: 28 MM HG (ref 35–45)
PCO2 BLDV: 30 MM HG (ref 40–50)
PCO2 BLDV: 32 MM HG (ref 40–50)
PCO2 BLDV: 44 MM HG (ref 40–50)
PH BLD: 7.5 [PH] (ref 7.35–7.45)
PH BLDV: 7.38 [PH] (ref 7.32–7.43)
PH BLDV: 7.44 [PH] (ref 7.32–7.43)
PH BLDV: 7.46 [PH] (ref 7.32–7.43)
PH UR STRIP: 5 [PH] (ref 4.7–8)
PH UR STRIP: 5.5 [PH] (ref 5–7)
PH UR STRIP: 7 [PH] (ref 4.7–8)
PHOSPHATE SERPL-MCNC: 3 MG/DL (ref 2.5–4.5)
PHOSPHATE SERPL-MCNC: 3 MG/DL (ref 2.5–4.5)
PHOSPHATE SERPL-MCNC: 3.1 MG/DL (ref 2.5–4.5)
PHOSPHATE SERPL-MCNC: 3.3 MG/DL (ref 2.5–4.5)
PHOSPHATE SERPL-MCNC: 3.4 MG/DL (ref 2.5–4.5)
PLATELET # BLD AUTO: 230 10E3/UL (ref 150–450)
PLATELET # BLD AUTO: 231 10E3/UL (ref 150–450)
PLATELET # BLD AUTO: 234 10E3/UL (ref 150–450)
PLATELET # BLD AUTO: 236 10E3/UL (ref 150–450)
PLATELET # BLD AUTO: 244 10E3/UL (ref 150–450)
PLATELET # BLD AUTO: 258 10E3/UL (ref 150–450)
PLATELET # BLD AUTO: 261 10E3/UL (ref 150–450)
PLATELET # BLD AUTO: 265 10E3/UL (ref 150–450)
PLATELET # BLD AUTO: 268 10E3/UL (ref 150–450)
PLATELET # BLD AUTO: 271 10E3/UL (ref 150–450)
PLATELET # BLD AUTO: 280 10E3/UL (ref 150–450)
PLATELET # BLD AUTO: 281 10E3/UL (ref 150–450)
PLATELET # BLD AUTO: 296 10E3/UL (ref 150–450)
PLATELET # BLD AUTO: 303 10E3/UL (ref 150–450)
PO2 BLD: 87 MM HG (ref 80–105)
PO2 BLDV: 31 MM HG (ref 25–47)
PO2 BLDV: 55 MM HG (ref 25–47)
PO2 BLDV: 91 MM HG (ref 25–47)
POTASSIUM SERPL-SCNC: 3.2 MMOL/L (ref 3.4–5.3)
POTASSIUM SERPL-SCNC: 3.3 MMOL/L (ref 3.4–5.3)
POTASSIUM SERPL-SCNC: 3.3 MMOL/L (ref 3.4–5.3)
POTASSIUM SERPL-SCNC: 3.5 MMOL/L (ref 3.4–5.3)
POTASSIUM SERPL-SCNC: 3.6 MMOL/L (ref 3.4–5.3)
POTASSIUM SERPL-SCNC: 3.8 MMOL/L (ref 3.4–5.3)
POTASSIUM SERPL-SCNC: 3.8 MMOL/L (ref 3.4–5.3)
POTASSIUM SERPL-SCNC: 3.9 MMOL/L (ref 3.4–5.3)
POTASSIUM SERPL-SCNC: 4 MMOL/L (ref 3.4–5.3)
POTASSIUM SERPL-SCNC: 4.1 MMOL/L (ref 3.4–5.3)
POTASSIUM SERPL-SCNC: 4.2 MMOL/L (ref 3.4–5.3)
POTASSIUM SERPL-SCNC: 4.2 MMOL/L (ref 3.4–5.3)
POTASSIUM SERPL-SCNC: 4.3 MMOL/L (ref 3.4–5.3)
POTASSIUM SERPL-SCNC: 4.3 MMOL/L (ref 3.4–5.3)
POTASSIUM SERPL-SCNC: 4.4 MMOL/L (ref 3.4–5.3)
PR INTERVAL - MUSE: NORMAL MS
PR INTERVAL - MUSE: NORMAL MS
PROCALCITONIN SERPL IA-MCNC: 0.04 NG/ML
PROCALCITONIN SERPL IA-MCNC: 0.05 NG/ML
PROT SERPL-MCNC: 5.9 G/DL (ref 6.4–8.3)
PROT SERPL-MCNC: 6 G/DL (ref 6.4–8.3)
PROT SERPL-MCNC: 6.5 G/DL (ref 6.4–8.3)
PROT SERPL-MCNC: 7.3 G/DL (ref 6.4–8.3)
QRS DURATION - MUSE: 76 MS
QRS DURATION - MUSE: 82 MS
QT - MUSE: 370 MS
QT - MUSE: 396 MS
QTC - MUSE: 455 MS
QTC - MUSE: 467 MS
R AXIS - MUSE: 35 DEGREES
R AXIS - MUSE: 46 DEGREES
RBC # BLD AUTO: 4.13 10E6/UL (ref 3.8–5.2)
RBC # BLD AUTO: 4.21 10E6/UL (ref 3.8–5.2)
RBC # BLD AUTO: 4.34 10E6/UL (ref 3.8–5.2)
RBC # BLD AUTO: 4.38 10E6/UL (ref 3.8–5.2)
RBC # BLD AUTO: 4.54 10E6/UL (ref 3.8–5.2)
RBC # BLD AUTO: 4.57 10E6/UL (ref 3.8–5.2)
RBC # BLD AUTO: 4.69 10E6/UL (ref 3.8–5.2)
RBC # BLD AUTO: 4.75 10E6/UL (ref 3.8–5.2)
RBC # BLD AUTO: 4.77 10E6/UL (ref 3.8–5.2)
RBC # BLD AUTO: 4.81 10E6/UL (ref 3.8–5.2)
RBC # BLD AUTO: 4.87 10E6/UL (ref 3.8–5.2)
RBC # BLD AUTO: 4.94 10E6/UL (ref 3.8–5.2)
RBC # BLD AUTO: 5.13 10E6/UL (ref 3.8–5.2)
RBC # BLD AUTO: 5.9 10E6/UL (ref 3.8–5.2)
RBC #/AREA URNS AUTO: ABNORMAL /HPF
RBC URINE: 40 /HPF
RBC URINE: 6 /HPF
RSV RNA SPEC NAA+PROBE: NEGATIVE
SAO2 % BLDA: 96 % (ref 92–100)
SAO2 % BLDV: 53 % (ref 70–75)
SAO2 % BLDV: 88.1 % (ref 70–75)
SAO2 % BLDV: 97.4 % (ref 70–75)
SARS-COV-2 RNA RESP QL NAA+PROBE: NEGATIVE
SODIUM SERPL-SCNC: 135 MMOL/L (ref 135–145)
SODIUM SERPL-SCNC: 136 MMOL/L (ref 135–145)
SODIUM SERPL-SCNC: 137 MMOL/L (ref 135–145)
SODIUM SERPL-SCNC: 138 MMOL/L (ref 135–145)
SODIUM SERPL-SCNC: 140 MMOL/L (ref 135–145)
SP GR UR STRIP: 1.02 (ref 1–1.03)
SP GR UR STRIP: 1.02 (ref 1–1.03)
SP GR UR STRIP: >=1.03 (ref 1–1.03)
SQUAMOUS #/AREA URNS AUTO: ABNORMAL /LPF
SQUAMOUS EPITHELIAL: 0 /HPF
SQUAMOUS EPITHELIAL: 1 /HPF
SYSTOLIC BLOOD PRESSURE - MUSE: NORMAL MMHG
SYSTOLIC BLOOD PRESSURE - MUSE: NORMAL MMHG
T AXIS - MUSE: 100 DEGREES
T AXIS - MUSE: 154 DEGREES
TROPONIN T SERPL HS-MCNC: 29 NG/L
TROPONIN T SERPL HS-MCNC: 29 NG/L
TSH SERPL DL<=0.005 MIU/L-ACNC: 1.71 UIU/ML (ref 0.3–4.2)
UROBILINOGEN UR STRIP-ACNC: 0.2 E.U./DL
UROBILINOGEN UR STRIP-MCNC: NORMAL MG/DL
UROBILINOGEN UR STRIP-MCNC: NORMAL MG/DL
VENTRICULAR RATE- MUSE: 84 BPM
VENTRICULAR RATE- MUSE: 91 BPM
WBC # BLD AUTO: 3.7 10E3/UL (ref 4–11)
WBC # BLD AUTO: 3.7 10E3/UL (ref 4–11)
WBC # BLD AUTO: 3.8 10E3/UL (ref 4–11)
WBC # BLD AUTO: 4.1 10E3/UL (ref 4–11)
WBC # BLD AUTO: 4.2 10E3/UL (ref 4–11)
WBC # BLD AUTO: 4.3 10E3/UL (ref 4–11)
WBC # BLD AUTO: 4.5 10E3/UL (ref 4–11)
WBC # BLD AUTO: 4.8 10E3/UL (ref 4–11)
WBC # BLD AUTO: 4.9 10E3/UL (ref 4–11)
WBC # BLD AUTO: 4.9 10E3/UL (ref 4–11)
WBC # BLD AUTO: 5.5 10E3/UL (ref 4–11)
WBC # BLD AUTO: 6.4 10E3/UL (ref 4–11)
WBC # BLD AUTO: 6.5 10E3/UL (ref 4–11)
WBC # BLD AUTO: 8.5 10E3/UL (ref 4–11)
WBC #/AREA URNS AUTO: ABNORMAL /HPF
WBC CLUMPS #/AREA URNS HPF: PRESENT /HPF
WBC CLUMPS #/AREA URNS HPF: PRESENT /HPF
WBC URINE: 104 /HPF
WBC URINE: >182 /HPF

## 2024-01-01 PROCEDURE — 250N000013 HC RX MED GY IP 250 OP 250 PS 637: Performed by: INTERNAL MEDICINE

## 2024-01-01 PROCEDURE — 83880 ASSAY OF NATRIURETIC PEPTIDE: CPT | Performed by: INTERNAL MEDICINE

## 2024-01-01 PROCEDURE — 99496 TRANSJ CARE MGMT HIGH F2F 7D: CPT | Performed by: NURSE PRACTITIONER

## 2024-01-01 PROCEDURE — 84484 ASSAY OF TROPONIN QUANT: CPT | Performed by: STUDENT IN AN ORGANIZED HEALTH CARE EDUCATION/TRAINING PROGRAM

## 2024-01-01 PROCEDURE — G1010 CDSM STANSON: HCPCS

## 2024-01-01 PROCEDURE — 87086 URINE CULTURE/COLONY COUNT: CPT | Performed by: STUDENT IN AN ORGANIZED HEALTH CARE EDUCATION/TRAINING PROGRAM

## 2024-01-01 PROCEDURE — 99285 EMERGENCY DEPT VISIT HI MDM: CPT | Performed by: STUDENT IN AN ORGANIZED HEALTH CARE EDUCATION/TRAINING PROGRAM

## 2024-01-01 PROCEDURE — 83605 ASSAY OF LACTIC ACID: CPT | Performed by: EMERGENCY MEDICINE

## 2024-01-01 PROCEDURE — 80048 BASIC METABOLIC PNL TOTAL CA: CPT | Performed by: EMERGENCY MEDICINE

## 2024-01-01 PROCEDURE — 80069 RENAL FUNCTION PANEL: CPT | Performed by: INTERNAL MEDICINE

## 2024-01-01 PROCEDURE — 99233 SBSQ HOSP IP/OBS HIGH 50: CPT | Performed by: INTERNAL MEDICINE

## 2024-01-01 PROCEDURE — 36415 COLL VENOUS BLD VENIPUNCTURE: CPT | Performed by: INTERNAL MEDICINE

## 2024-01-01 PROCEDURE — 82728 ASSAY OF FERRITIN: CPT | Performed by: INTERNAL MEDICINE

## 2024-01-01 PROCEDURE — 82010 KETONE BODYS QUAN: CPT | Performed by: STUDENT IN AN ORGANIZED HEALTH CARE EDUCATION/TRAINING PROGRAM

## 2024-01-01 PROCEDURE — 99231 SBSQ HOSP IP/OBS SF/LOW 25: CPT | Performed by: INTERNAL MEDICINE

## 2024-01-01 PROCEDURE — 70450 CT HEAD/BRAIN W/O DYE: CPT | Mod: MG

## 2024-01-01 PROCEDURE — 92526 ORAL FUNCTION THERAPY: CPT | Mod: GN

## 2024-01-01 PROCEDURE — 82040 ASSAY OF SERUM ALBUMIN: CPT | Performed by: INTERNAL MEDICINE

## 2024-01-01 PROCEDURE — G0378 HOSPITAL OBSERVATION PER HR: HCPCS

## 2024-01-01 PROCEDURE — 70480 CT ORBIT/EAR/FOSSA W/O DYE: CPT | Mod: MG

## 2024-01-01 PROCEDURE — 250N000011 HC RX IP 250 OP 636: Performed by: INTERNAL MEDICINE

## 2024-01-01 PROCEDURE — 99285 EMERGENCY DEPT VISIT HI MDM: CPT | Mod: 25

## 2024-01-01 PROCEDURE — 82248 BILIRUBIN DIRECT: CPT | Performed by: INTERNAL MEDICINE

## 2024-01-01 PROCEDURE — 87040 BLOOD CULTURE FOR BACTERIA: CPT | Performed by: STUDENT IN AN ORGANIZED HEALTH CARE EDUCATION/TRAINING PROGRAM

## 2024-01-01 PROCEDURE — 99232 SBSQ HOSP IP/OBS MODERATE 35: CPT | Performed by: INTERNAL MEDICINE

## 2024-01-01 PROCEDURE — 71045 X-RAY EXAM CHEST 1 VIEW: CPT

## 2024-01-01 PROCEDURE — 36415 COLL VENOUS BLD VENIPUNCTURE: CPT | Performed by: STUDENT IN AN ORGANIZED HEALTH CARE EDUCATION/TRAINING PROGRAM

## 2024-01-01 PROCEDURE — 120N000001 HC R&B MED SURG/OB

## 2024-01-01 PROCEDURE — 85027 COMPLETE CBC AUTOMATED: CPT | Performed by: HOSPITALIST

## 2024-01-01 PROCEDURE — 250N000011 HC RX IP 250 OP 636: Performed by: RADIOLOGY

## 2024-01-01 PROCEDURE — 99239 HOSP IP/OBS DSCHRG MGMT >30: CPT | Performed by: INTERNAL MEDICINE

## 2024-01-01 PROCEDURE — 85027 COMPLETE CBC AUTOMATED: CPT | Performed by: INTERNAL MEDICINE

## 2024-01-01 PROCEDURE — 99223 1ST HOSP IP/OBS HIGH 75: CPT | Mod: AI | Performed by: INTERNAL MEDICINE

## 2024-01-01 PROCEDURE — 85041 AUTOMATED RBC COUNT: CPT | Performed by: STUDENT IN AN ORGANIZED HEALTH CARE EDUCATION/TRAINING PROGRAM

## 2024-01-01 PROCEDURE — 250N000013 HC RX MED GY IP 250 OP 250 PS 637: Performed by: HOSPITALIST

## 2024-01-01 PROCEDURE — 85379 FIBRIN DEGRADATION QUANT: CPT | Performed by: INTERNAL MEDICINE

## 2024-01-01 PROCEDURE — 96365 THER/PROPH/DIAG IV INF INIT: CPT

## 2024-01-01 PROCEDURE — 84100 ASSAY OF PHOSPHORUS: CPT | Performed by: INTERNAL MEDICINE

## 2024-01-01 PROCEDURE — 93010 ELECTROCARDIOGRAM REPORT: CPT | Performed by: INTERNAL MEDICINE

## 2024-01-01 PROCEDURE — G0463 HOSPITAL OUTPT CLINIC VISIT: HCPCS

## 2024-01-01 PROCEDURE — 96372 THER/PROPH/DIAG INJ SC/IM: CPT | Performed by: INTERNAL MEDICINE

## 2024-01-01 PROCEDURE — 258N000003 HC RX IP 258 OP 636: Performed by: INTERNAL MEDICINE

## 2024-01-01 PROCEDURE — 82805 BLOOD GASES W/O2 SATURATION: CPT | Performed by: INTERNAL MEDICINE

## 2024-01-01 PROCEDURE — 250N000011 HC RX IP 250 OP 636: Performed by: HOSPITALIST

## 2024-01-01 PROCEDURE — 83605 ASSAY OF LACTIC ACID: CPT | Performed by: STUDENT IN AN ORGANIZED HEALTH CARE EDUCATION/TRAINING PROGRAM

## 2024-01-01 PROCEDURE — 83605 ASSAY OF LACTIC ACID: CPT | Performed by: INTERNAL MEDICINE

## 2024-01-01 PROCEDURE — 250N000011 HC RX IP 250 OP 636

## 2024-01-01 PROCEDURE — 93306 TTE W/DOPPLER COMPLETE: CPT

## 2024-01-01 PROCEDURE — 36415 COLL VENOUS BLD VENIPUNCTURE: CPT | Performed by: HOSPITALIST

## 2024-01-01 PROCEDURE — 250N000011 HC RX IP 250 OP 636: Mod: JZ | Performed by: INTERNAL MEDICINE

## 2024-01-01 PROCEDURE — 99284 EMERGENCY DEPT VISIT MOD MDM: CPT | Performed by: STUDENT IN AN ORGANIZED HEALTH CARE EDUCATION/TRAINING PROGRAM

## 2024-01-01 PROCEDURE — 80048 BASIC METABOLIC PNL TOTAL CA: CPT | Performed by: INTERNAL MEDICINE

## 2024-01-01 PROCEDURE — 250N000011 HC RX IP 250 OP 636: Performed by: STUDENT IN AN ORGANIZED HEALTH CARE EDUCATION/TRAINING PROGRAM

## 2024-01-01 PROCEDURE — 258N000003 HC RX IP 258 OP 636: Performed by: HOSPITALIST

## 2024-01-01 PROCEDURE — 97530 THERAPEUTIC ACTIVITIES: CPT | Mod: GP,CQ

## 2024-01-01 PROCEDURE — 93005 ELECTROCARDIOGRAM TRACING: CPT

## 2024-01-01 PROCEDURE — 97165 OT EVAL LOW COMPLEX 30 MIN: CPT | Mod: GO

## 2024-01-01 PROCEDURE — 84145 PROCALCITONIN (PCT): CPT | Performed by: INTERNAL MEDICINE

## 2024-01-01 PROCEDURE — 80053 COMPREHEN METABOLIC PANEL: CPT | Performed by: STUDENT IN AN ORGANIZED HEALTH CARE EDUCATION/TRAINING PROGRAM

## 2024-01-01 PROCEDURE — 85025 COMPLETE CBC W/AUTO DIFF WBC: CPT | Performed by: INTERNAL MEDICINE

## 2024-01-01 PROCEDURE — 93306 TTE W/DOPPLER COMPLETE: CPT | Mod: 26 | Performed by: INTERNAL MEDICINE

## 2024-01-01 PROCEDURE — 83550 IRON BINDING TEST: CPT | Performed by: INTERNAL MEDICINE

## 2024-01-01 PROCEDURE — 99285 EMERGENCY DEPT VISIT HI MDM: CPT | Performed by: EMERGENCY MEDICINE

## 2024-01-01 PROCEDURE — 96375 TX/PRO/DX INJ NEW DRUG ADDON: CPT

## 2024-01-01 PROCEDURE — 250N000009 HC RX 250: Performed by: INTERNAL MEDICINE

## 2024-01-01 PROCEDURE — 82247 BILIRUBIN TOTAL: CPT | Performed by: STUDENT IN AN ORGANIZED HEALTH CARE EDUCATION/TRAINING PROGRAM

## 2024-01-01 PROCEDURE — 81001 URINALYSIS AUTO W/SCOPE: CPT | Mod: ZL

## 2024-01-01 PROCEDURE — 82962 GLUCOSE BLOOD TEST: CPT

## 2024-01-01 PROCEDURE — 81001 URINALYSIS AUTO W/SCOPE: CPT | Performed by: STUDENT IN AN ORGANIZED HEALTH CARE EDUCATION/TRAINING PROGRAM

## 2024-01-01 PROCEDURE — 97161 PT EVAL LOW COMPLEX 20 MIN: CPT | Mod: GP

## 2024-01-01 PROCEDURE — 250N000011 HC RX IP 250 OP 636: Performed by: EMERGENCY MEDICINE

## 2024-01-01 PROCEDURE — 99223 1ST HOSP IP/OBS HIGH 75: CPT | Mod: AI | Performed by: HOSPITALIST

## 2024-01-01 PROCEDURE — 84145 PROCALCITONIN (PCT): CPT | Performed by: STUDENT IN AN ORGANIZED HEALTH CARE EDUCATION/TRAINING PROGRAM

## 2024-01-01 PROCEDURE — 96361 HYDRATE IV INFUSION ADD-ON: CPT

## 2024-01-01 PROCEDURE — 258N000003 HC RX IP 258 OP 636: Performed by: EMERGENCY MEDICINE

## 2024-01-01 PROCEDURE — 96365 THER/PROPH/DIAG IV INF INIT: CPT | Mod: XU

## 2024-01-01 PROCEDURE — 84484 ASSAY OF TROPONIN QUANT: CPT | Performed by: EMERGENCY MEDICINE

## 2024-01-01 PROCEDURE — 87186 SC STD MICRODIL/AGAR DIL: CPT | Performed by: STUDENT IN AN ORGANIZED HEALTH CARE EDUCATION/TRAINING PROGRAM

## 2024-01-01 PROCEDURE — 70551 MRI BRAIN STEM W/O DYE: CPT | Mod: MG

## 2024-01-01 PROCEDURE — 85025 COMPLETE CBC W/AUTO DIFF WBC: CPT | Performed by: EMERGENCY MEDICINE

## 2024-01-01 PROCEDURE — 36600 WITHDRAWAL OF ARTERIAL BLOOD: CPT

## 2024-01-01 PROCEDURE — 74177 CT ABD & PELVIS W/CONTRAST: CPT | Mod: MG

## 2024-01-01 PROCEDURE — 82805 BLOOD GASES W/O2 SATURATION: CPT | Performed by: STUDENT IN AN ORGANIZED HEALTH CARE EDUCATION/TRAINING PROGRAM

## 2024-01-01 PROCEDURE — 258N000003 HC RX IP 258 OP 636: Performed by: STUDENT IN AN ORGANIZED HEALTH CARE EDUCATION/TRAINING PROGRAM

## 2024-01-01 PROCEDURE — 82140 ASSAY OF AMMONIA: CPT | Performed by: STUDENT IN AN ORGANIZED HEALTH CARE EDUCATION/TRAINING PROGRAM

## 2024-01-01 PROCEDURE — 71275 CT ANGIOGRAPHY CHEST: CPT | Mod: MG

## 2024-01-01 PROCEDURE — 84443 ASSAY THYROID STIM HORMONE: CPT | Performed by: STUDENT IN AN ORGANIZED HEALTH CARE EDUCATION/TRAINING PROGRAM

## 2024-01-01 PROCEDURE — 80048 BASIC METABOLIC PNL TOTAL CA: CPT | Performed by: HOSPITALIST

## 2024-01-01 PROCEDURE — 92610 EVALUATE SWALLOWING FUNCTION: CPT | Mod: GN

## 2024-01-01 PROCEDURE — 83690 ASSAY OF LIPASE: CPT | Performed by: STUDENT IN AN ORGANIZED HEALTH CARE EDUCATION/TRAINING PROGRAM

## 2024-01-01 PROCEDURE — 83735 ASSAY OF MAGNESIUM: CPT | Performed by: STUDENT IN AN ORGANIZED HEALTH CARE EDUCATION/TRAINING PROGRAM

## 2024-01-01 PROCEDURE — 250N000013 HC RX MED GY IP 250 OP 250 PS 637: Performed by: STUDENT IN AN ORGANIZED HEALTH CARE EDUCATION/TRAINING PROGRAM

## 2024-01-01 PROCEDURE — 258N000001 HC RX 258: Performed by: HOSPITALIST

## 2024-01-01 PROCEDURE — 99418 PROLNG IP/OBS E/M EA 15 MIN: CPT | Performed by: INTERNAL MEDICINE

## 2024-01-01 PROCEDURE — G0180 MD CERTIFICATION HHA PATIENT: HCPCS | Performed by: NURSE PRACTITIONER

## 2024-01-01 PROCEDURE — 85025 COMPLETE CBC W/AUTO DIFF WBC: CPT | Performed by: STUDENT IN AN ORGANIZED HEALTH CARE EDUCATION/TRAINING PROGRAM

## 2024-01-01 PROCEDURE — 250N000009 HC RX 250: Performed by: STUDENT IN AN ORGANIZED HEALTH CARE EDUCATION/TRAINING PROGRAM

## 2024-01-01 PROCEDURE — 86140 C-REACTIVE PROTEIN: CPT | Performed by: INTERNAL MEDICINE

## 2024-01-01 PROCEDURE — 82077 ASSAY SPEC XCP UR&BREATH IA: CPT | Performed by: STUDENT IN AN ORGANIZED HEALTH CARE EDUCATION/TRAINING PROGRAM

## 2024-01-01 PROCEDURE — 87637 SARSCOV2&INF A&B&RSV AMP PRB: CPT | Performed by: STUDENT IN AN ORGANIZED HEALTH CARE EDUCATION/TRAINING PROGRAM

## 2024-01-01 PROCEDURE — 36415 COLL VENOUS BLD VENIPUNCTURE: CPT | Performed by: EMERGENCY MEDICINE

## 2024-01-01 PROCEDURE — 83880 ASSAY OF NATRIURETIC PEPTIDE: CPT | Performed by: STUDENT IN AN ORGANIZED HEALTH CARE EDUCATION/TRAINING PROGRAM

## 2024-01-01 PROCEDURE — 82550 ASSAY OF CK (CPK): CPT | Performed by: STUDENT IN AN ORGANIZED HEALTH CARE EDUCATION/TRAINING PROGRAM

## 2024-01-01 PROCEDURE — 82140 ASSAY OF AMMONIA: CPT | Performed by: INTERNAL MEDICINE

## 2024-01-01 RX ORDER — OLANZAPINE 10 MG/2ML
5 INJECTION, POWDER, FOR SOLUTION INTRAMUSCULAR ONCE
Status: DISCONTINUED | OUTPATIENT
Start: 2024-01-01 | End: 2024-01-01

## 2024-01-01 RX ORDER — METOPROLOL TARTRATE 25 MG/1
25 TABLET, FILM COATED ORAL EVERY 12 HOURS
Status: DISCONTINUED | OUTPATIENT
Start: 2024-01-01 | End: 2024-01-01

## 2024-01-01 RX ORDER — MORPHINE SULFATE 20 MG/ML
5 SOLUTION ORAL
Status: DISCONTINUED | OUTPATIENT
Start: 2024-01-01 | End: 2024-01-01 | Stop reason: HOSPADM

## 2024-01-01 RX ORDER — IOPAMIDOL 755 MG/ML
65 INJECTION, SOLUTION INTRAVASCULAR ONCE
Status: COMPLETED | OUTPATIENT
Start: 2024-01-01 | End: 2024-01-01

## 2024-01-01 RX ORDER — LIDOCAINE HYDROCHLORIDE 20 MG/ML
JELLY TOPICAL ONCE
Status: COMPLETED | OUTPATIENT
Start: 2024-01-01 | End: 2024-01-01

## 2024-01-01 RX ORDER — OXYCODONE AND ACETAMINOPHEN 2.5; 325 MG/1; MG/1
1 TABLET ORAL 2 TIMES DAILY PRN
Status: DISCONTINUED | OUTPATIENT
Start: 2024-01-01 | End: 2024-01-01

## 2024-01-01 RX ORDER — MEROPENEM 1 G/1
1 INJECTION, POWDER, FOR SOLUTION INTRAVENOUS EVERY 12 HOURS
Status: DISCONTINUED | OUTPATIENT
Start: 2024-01-01 | End: 2024-01-01

## 2024-01-01 RX ORDER — NALOXONE HYDROCHLORIDE 0.4 MG/ML
0.2 INJECTION, SOLUTION INTRAMUSCULAR; INTRAVENOUS; SUBCUTANEOUS
Status: DISCONTINUED | OUTPATIENT
Start: 2024-01-01 | End: 2024-01-01 | Stop reason: HOSPADM

## 2024-01-01 RX ORDER — ACETAMINOPHEN 325 MG/1
325 TABLET ORAL 2 TIMES DAILY PRN
Status: DISCONTINUED | OUTPATIENT
Start: 2024-01-01 | End: 2024-01-01 | Stop reason: HOSPADM

## 2024-01-01 RX ORDER — ONDANSETRON 4 MG/1
4 TABLET, ORALLY DISINTEGRATING ORAL EVERY 6 HOURS PRN
Status: DISCONTINUED | OUTPATIENT
Start: 2024-01-01 | End: 2024-01-01 | Stop reason: HOSPADM

## 2024-01-01 RX ORDER — CEFTRIAXONE SODIUM 1 G/50ML
1 INJECTION, SOLUTION INTRAVENOUS ONCE
Status: COMPLETED | OUTPATIENT
Start: 2024-01-01 | End: 2024-01-01

## 2024-01-01 RX ORDER — LORAZEPAM 1 MG/1
2 TABLET ORAL EVERY 4 HOURS PRN
Status: DISCONTINUED | OUTPATIENT
Start: 2024-01-01 | End: 2024-01-01 | Stop reason: HOSPADM

## 2024-01-01 RX ORDER — POTASSIUM CHLORIDE 7.45 MG/ML
10 INJECTION INTRAVENOUS ONCE
Status: COMPLETED | OUTPATIENT
Start: 2024-01-01 | End: 2024-01-01

## 2024-01-01 RX ORDER — IOPAMIDOL 755 MG/ML
52 INJECTION, SOLUTION INTRAVASCULAR ONCE
Status: COMPLETED | OUTPATIENT
Start: 2024-01-01 | End: 2024-01-01

## 2024-01-01 RX ORDER — ONDANSETRON 2 MG/ML
4 INJECTION INTRAMUSCULAR; INTRAVENOUS EVERY 6 HOURS PRN
Status: DISCONTINUED | OUTPATIENT
Start: 2024-01-01 | End: 2024-01-01 | Stop reason: HOSPADM

## 2024-01-01 RX ORDER — AMOXICILLIN 250 MG
1 CAPSULE ORAL 2 TIMES DAILY PRN
Status: DISCONTINUED | OUTPATIENT
Start: 2024-01-01 | End: 2024-01-01 | Stop reason: HOSPADM

## 2024-01-01 RX ORDER — MAGNESIUM CARB/ALUMINUM HYDROX 105-160MG
296 TABLET,CHEWABLE ORAL ONCE
Qty: 296 ML | Refills: 0 | Status: SHIPPED | OUTPATIENT
Start: 2024-01-01 | End: 2024-01-01

## 2024-01-01 RX ORDER — NALOXONE HYDROCHLORIDE 0.4 MG/ML
0.1 INJECTION, SOLUTION INTRAMUSCULAR; INTRAVENOUS; SUBCUTANEOUS
Status: DISCONTINUED | OUTPATIENT
Start: 2024-01-01 | End: 2024-01-01 | Stop reason: HOSPADM

## 2024-01-01 RX ORDER — MINERAL OIL 100 G/100G
1 OIL RECTAL ONCE
Qty: 133 ML | Refills: 0 | Status: SHIPPED | OUTPATIENT
Start: 2024-01-01 | End: 2024-01-01

## 2024-01-01 RX ORDER — CEFTRIAXONE SODIUM 2 G/50ML
2 INJECTION, SOLUTION INTRAVENOUS EVERY 24 HOURS
Status: DISCONTINUED | OUTPATIENT
Start: 2024-01-01 | End: 2024-01-01

## 2024-01-01 RX ORDER — AMOXICILLIN 250 MG
2 CAPSULE ORAL 2 TIMES DAILY PRN
Status: DISCONTINUED | OUTPATIENT
Start: 2024-01-01 | End: 2024-01-01 | Stop reason: HOSPADM

## 2024-01-01 RX ORDER — LORAZEPAM 2 MG/ML
0.5 INJECTION INTRAMUSCULAR EVERY 4 HOURS PRN
Status: DISCONTINUED | OUTPATIENT
Start: 2024-01-01 | End: 2024-01-01 | Stop reason: HOSPADM

## 2024-01-01 RX ORDER — FENTANYL CITRATE 50 UG/ML
50 INJECTION, SOLUTION INTRAMUSCULAR; INTRAVENOUS ONCE
Status: COMPLETED | OUTPATIENT
Start: 2024-01-01 | End: 2024-01-01

## 2024-01-01 RX ORDER — OLANZAPINE 5 MG/1
5 TABLET ORAL
Status: COMPLETED | OUTPATIENT
Start: 2024-01-01 | End: 2024-01-01

## 2024-01-01 RX ORDER — NALOXONE HYDROCHLORIDE 0.4 MG/ML
0.4 INJECTION, SOLUTION INTRAMUSCULAR; INTRAVENOUS; SUBCUTANEOUS
Status: DISCONTINUED | OUTPATIENT
Start: 2024-01-01 | End: 2024-01-01 | Stop reason: HOSPADM

## 2024-01-01 RX ORDER — POTASSIUM CHLORIDE 1500 MG/1
40 TABLET, EXTENDED RELEASE ORAL ONCE
Status: COMPLETED | OUTPATIENT
Start: 2024-01-01 | End: 2024-01-01

## 2024-01-01 RX ORDER — ACETAMINOPHEN 325 MG/1
975 TABLET ORAL ONCE
Status: COMPLETED | OUTPATIENT
Start: 2024-01-01 | End: 2024-01-01

## 2024-01-01 RX ORDER — SODIUM CHLORIDE, SODIUM LACTATE, POTASSIUM CHLORIDE, CALCIUM CHLORIDE 600; 310; 30; 20 MG/100ML; MG/100ML; MG/100ML; MG/100ML
INJECTION, SOLUTION INTRAVENOUS CONTINUOUS
Status: DISCONTINUED | OUTPATIENT
Start: 2024-01-01 | End: 2024-01-01

## 2024-01-01 RX ORDER — OXYCODONE AND ACETAMINOPHEN 5; 325 MG/1; MG/1
0.5 TABLET ORAL 2 TIMES DAILY PRN
COMMUNITY
Start: 2024-01-01

## 2024-01-01 RX ORDER — VANCOMYCIN HYDROCHLORIDE 1 G/200ML
1000 INJECTION, SOLUTION INTRAVENOUS ONCE
Status: COMPLETED | OUTPATIENT
Start: 2024-01-01 | End: 2024-01-01

## 2024-01-01 RX ORDER — LORAZEPAM 0.5 MG/1
0.5 TABLET ORAL EVERY 6 HOURS PRN
Qty: 30 TABLET | Refills: 0 | Status: SHIPPED | OUTPATIENT
Start: 2024-01-01

## 2024-01-01 RX ORDER — CEFTRIAXONE SODIUM 1 G/50ML
1 INJECTION, SOLUTION INTRAVENOUS EVERY 24 HOURS
Status: DISCONTINUED | OUTPATIENT
Start: 2024-01-01 | End: 2024-01-01

## 2024-01-01 RX ORDER — FUROSEMIDE 10 MG/ML
10 INJECTION INTRAMUSCULAR; INTRAVENOUS ONCE
Status: COMPLETED | OUTPATIENT
Start: 2024-01-01 | End: 2024-01-01

## 2024-01-01 RX ORDER — ATROPINE SULFATE 10 MG/ML
2 SOLUTION/ DROPS OPHTHALMIC EVERY 4 HOURS PRN
Status: DISCONTINUED | OUTPATIENT
Start: 2024-01-01 | End: 2024-01-01 | Stop reason: HOSPADM

## 2024-01-01 RX ORDER — CEPHALEXIN 500 MG/1
500 CAPSULE ORAL 4 TIMES DAILY
Qty: 28 CAPSULE | Refills: 0 | Status: ON HOLD | OUTPATIENT
Start: 2024-01-01 | End: 2024-01-01

## 2024-01-01 RX ORDER — MORPHINE SULFATE 20 MG/ML
10 SOLUTION ORAL
Status: DISCONTINUED | OUTPATIENT
Start: 2024-01-01 | End: 2024-01-01 | Stop reason: HOSPADM

## 2024-01-01 RX ORDER — DEXTROSE MONOHYDRATE 25 G/50ML
50 INJECTION, SOLUTION INTRAVENOUS ONCE
Status: COMPLETED | OUTPATIENT
Start: 2024-01-01 | End: 2024-01-01

## 2024-01-01 RX ORDER — LABETALOL 20 MG/4 ML (5 MG/ML) INTRAVENOUS SYRINGE
10 EVERY 4 HOURS PRN
Status: DISCONTINUED | OUTPATIENT
Start: 2024-01-01 | End: 2024-01-01 | Stop reason: HOSPADM

## 2024-01-01 RX ORDER — SODIUM CHLORIDE 9 MG/ML
INJECTION, SOLUTION INTRAVENOUS CONTINUOUS
Status: DISCONTINUED | OUTPATIENT
Start: 2024-01-01 | End: 2024-01-01

## 2024-01-01 RX ORDER — HYDRALAZINE HYDROCHLORIDE 20 MG/ML
20 INJECTION INTRAMUSCULAR; INTRAVENOUS ONCE
Status: COMPLETED | OUTPATIENT
Start: 2024-01-01 | End: 2024-01-01

## 2024-01-01 RX ORDER — OLANZAPINE 10 MG/2ML
5 INJECTION, POWDER, FOR SOLUTION INTRAMUSCULAR ONCE
Status: COMPLETED | OUTPATIENT
Start: 2024-01-01 | End: 2024-01-01

## 2024-01-01 RX ORDER — BISACODYL 10 MG
10 SUPPOSITORY, RECTAL RECTAL DAILY PRN
Status: DISCONTINUED | OUTPATIENT
Start: 2024-01-01 | End: 2024-01-01 | Stop reason: HOSPADM

## 2024-01-01 RX ORDER — METOPROLOL TARTRATE 25 MG/1
25 TABLET, FILM COATED ORAL EVERY 12 HOURS
Status: DISCONTINUED | OUTPATIENT
Start: 2024-01-01 | End: 2024-01-01 | Stop reason: HOSPADM

## 2024-01-01 RX ORDER — METOPROLOL TARTRATE 1 MG/ML
2.5 INJECTION, SOLUTION INTRAVENOUS EVERY 6 HOURS PRN
Status: DISCONTINUED | OUTPATIENT
Start: 2024-01-01 | End: 2024-01-01

## 2024-01-01 RX ORDER — MAGNESIUM CARB/ALUMINUM HYDROX 105-160MG
148 TABLET,CHEWABLE ORAL ONCE
Status: COMPLETED | OUTPATIENT
Start: 2024-01-01 | End: 2024-01-01

## 2024-01-01 RX ORDER — LORAZEPAM 2 MG/ML
2 CONCENTRATE ORAL EVERY 4 HOURS PRN
Status: DISCONTINUED | OUTPATIENT
Start: 2024-01-01 | End: 2024-01-01

## 2024-01-01 RX ORDER — VANCOMYCIN HYDROCHLORIDE 1 G/200ML
INJECTION, SOLUTION INTRAVENOUS
Status: COMPLETED
Start: 2024-01-01 | End: 2024-01-01

## 2024-01-01 RX ORDER — AMMONIUM LACTATE 12 G/100G
CREAM TOPICAL EVERY MORNING
COMMUNITY

## 2024-01-01 RX ORDER — ASPIRIN 81 MG/1
81 TABLET ORAL DAILY PRN
COMMUNITY

## 2024-01-01 RX ORDER — LORAZEPAM 0.5 MG/1
0.5 TABLET ORAL EVERY 6 HOURS PRN
Qty: 20 TABLET | Refills: 0 | Status: SHIPPED | OUTPATIENT
Start: 2024-01-01 | End: 2024-01-01

## 2024-01-01 RX ORDER — OLANZAPINE 10 MG/2ML
5 INJECTION, POWDER, FOR SOLUTION INTRAMUSCULAR
Status: DISCONTINUED | OUTPATIENT
Start: 2024-01-01 | End: 2024-01-01

## 2024-01-01 RX ORDER — MORPHINE SULFATE 2 MG/ML
2 INJECTION, SOLUTION INTRAMUSCULAR; INTRAVENOUS
Status: DISCONTINUED | OUTPATIENT
Start: 2024-01-01 | End: 2024-01-01 | Stop reason: HOSPADM

## 2024-01-01 RX ORDER — HALOPERIDOL 2 MG/ML
1 SOLUTION ORAL
Status: DISCONTINUED | OUTPATIENT
Start: 2024-01-01 | End: 2024-01-01 | Stop reason: HOSPADM

## 2024-01-01 RX ORDER — LIDOCAINE 40 MG/G
CREAM TOPICAL
Status: DISCONTINUED | OUTPATIENT
Start: 2024-01-01 | End: 2024-01-01 | Stop reason: HOSPADM

## 2024-01-01 RX ORDER — MAGNESIUM CARB/ALUMINUM HYDROX 105-160MG
148 TABLET,CHEWABLE ORAL ONCE
Qty: 148 ML | Refills: 0 | Status: SHIPPED | OUTPATIENT
Start: 2024-01-01 | End: 2024-01-01

## 2024-01-01 RX ORDER — AMOXICILLIN 250 MG
2 CAPSULE ORAL 2 TIMES DAILY
Status: COMPLETED | OUTPATIENT
Start: 2024-01-01 | End: 2024-01-01

## 2024-01-01 RX ORDER — MORPHINE SULFATE 2 MG/ML
1 INJECTION, SOLUTION INTRAMUSCULAR; INTRAVENOUS
Status: DISCONTINUED | OUTPATIENT
Start: 2024-01-01 | End: 2024-01-01 | Stop reason: HOSPADM

## 2024-01-01 RX ORDER — METOPROLOL TARTRATE 25 MG/1
25 TABLET, FILM COATED ORAL EVERY 12 HOURS
Qty: 60 TABLET | Refills: 1 | OUTPATIENT
Start: 2024-01-01

## 2024-01-01 RX ORDER — BISACODYL 10 MG
10 SUPPOSITORY, RECTAL RECTAL
Status: DISCONTINUED | OUTPATIENT
Start: 2024-01-01 | End: 2024-01-01 | Stop reason: HOSPADM

## 2024-01-01 RX ORDER — MINERAL OIL/HYDROPHIL PETROLAT
OINTMENT (GRAM) TOPICAL
Status: DISCONTINUED | OUTPATIENT
Start: 2024-01-01 | End: 2024-01-01 | Stop reason: HOSPADM

## 2024-01-01 RX ORDER — QUETIAPINE FUMARATE 25 MG/1
12.5 TABLET, FILM COATED ORAL AT BEDTIME
Qty: 15 TABLET | Refills: 1 | Status: SHIPPED | OUTPATIENT
Start: 2024-01-01 | End: 2024-01-01

## 2024-01-01 RX ORDER — FUROSEMIDE 10 MG/ML
20 INJECTION INTRAMUSCULAR; INTRAVENOUS ONCE
Status: COMPLETED | OUTPATIENT
Start: 2024-01-01 | End: 2024-01-01

## 2024-01-01 RX ORDER — BISACODYL 10 MG
10 SUPPOSITORY, RECTAL RECTAL DAILY PRN
Qty: 30 SUPPOSITORY | Refills: 1 | Status: SHIPPED | OUTPATIENT
Start: 2024-01-01

## 2024-01-01 RX ORDER — METOPROLOL TARTRATE 25 MG/1
25 TABLET, FILM COATED ORAL EVERY 12 HOURS
Qty: 60 TABLET | Refills: 1 | Status: SHIPPED | OUTPATIENT
Start: 2024-01-01

## 2024-01-01 RX ORDER — CARBOXYMETHYLCELLULOSE SODIUM 5 MG/ML
1-2 SOLUTION/ DROPS OPHTHALMIC
Status: DISCONTINUED | OUTPATIENT
Start: 2024-01-01 | End: 2024-01-01 | Stop reason: HOSPADM

## 2024-01-01 RX ORDER — ACETAMINOPHEN 650 MG/1
650 SUPPOSITORY RECTAL EVERY 4 HOURS PRN
Status: DISCONTINUED | OUTPATIENT
Start: 2024-01-01 | End: 2024-01-01 | Stop reason: HOSPADM

## 2024-01-01 RX ORDER — ACETAMINOPHEN 325 MG/1
650 TABLET ORAL EVERY 4 HOURS PRN
Status: DISCONTINUED | OUTPATIENT
Start: 2024-01-01 | End: 2024-01-01 | Stop reason: HOSPADM

## 2024-01-01 RX ORDER — SENNOSIDES 8.6 MG
1 TABLET ORAL 2 TIMES DAILY PRN
Status: DISCONTINUED | OUTPATIENT
Start: 2024-01-01 | End: 2024-01-01 | Stop reason: HOSPADM

## 2024-01-01 RX ORDER — ENOXAPARIN SODIUM 100 MG/ML
30 INJECTION SUBCUTANEOUS EVERY 24 HOURS
Status: DISCONTINUED | OUTPATIENT
Start: 2024-01-01 | End: 2024-01-01 | Stop reason: HOSPADM

## 2024-01-01 RX ORDER — OLANZAPINE 5 MG/1
5 TABLET, ORALLY DISINTEGRATING ORAL EVERY 6 HOURS PRN
Status: DISCONTINUED | OUTPATIENT
Start: 2024-01-01 | End: 2024-01-01 | Stop reason: HOSPADM

## 2024-01-01 RX ADMIN — CEFTRIAXONE SODIUM 1 G: 1 INJECTION, SOLUTION INTRAVENOUS at 11:43

## 2024-01-01 RX ADMIN — CEFTRIAXONE SODIUM 1 G: 1 INJECTION, SOLUTION INTRAVENOUS at 11:53

## 2024-01-01 RX ADMIN — CEFTRIAXONE SODIUM 2 G: 2 INJECTION, SOLUTION INTRAVENOUS at 08:38

## 2024-01-01 RX ADMIN — FUROSEMIDE 10 MG: 10 INJECTION, SOLUTION INTRAVENOUS at 13:00

## 2024-01-01 RX ADMIN — CARBOXYMETHYLCELLULOSE SODIUM 2 DROP: 5 SOLUTION/ DROPS OPHTHALMIC at 20:27

## 2024-01-01 RX ADMIN — MORPHINE SULFATE 10 MG: 10 SOLUTION ORAL at 06:05

## 2024-01-01 RX ADMIN — METOPROLOL TARTRATE 25 MG: 25 TABLET, FILM COATED ORAL at 06:40

## 2024-01-01 RX ADMIN — ENOXAPARIN SODIUM 30 MG: 30 INJECTION SUBCUTANEOUS at 10:43

## 2024-01-01 RX ADMIN — SODIUM CHLORIDE: 9 INJECTION, SOLUTION INTRAVENOUS at 16:59

## 2024-01-01 RX ADMIN — METOPROLOL TARTRATE 25 MG: 25 TABLET, FILM COATED ORAL at 17:32

## 2024-01-01 RX ADMIN — POTASSIUM CHLORIDE 10 MEQ: 7.46 INJECTION, SOLUTION INTRAVENOUS at 10:50

## 2024-01-01 RX ADMIN — MEROPENEM 1 G: 1 INJECTION, POWDER, FOR SOLUTION INTRAVENOUS at 11:50

## 2024-01-01 RX ADMIN — CEFTRIAXONE SODIUM 2 G: 2 INJECTION, SOLUTION INTRAVENOUS at 09:19

## 2024-01-01 RX ADMIN — METOPROLOL TARTRATE 25 MG: 25 TABLET, FILM COATED ORAL at 05:52

## 2024-01-01 RX ADMIN — SODIUM CHLORIDE 1000 ML: 9 INJECTION, SOLUTION INTRAVENOUS at 11:21

## 2024-01-01 RX ADMIN — CEFTRIAXONE SODIUM 1 G: 1 INJECTION, SOLUTION INTRAVENOUS at 12:02

## 2024-01-01 RX ADMIN — SENNOSIDES AND DOCUSATE SODIUM 2 TABLET: 8.6; 5 TABLET ORAL at 10:42

## 2024-01-01 RX ADMIN — OLANZAPINE 5 MG: 5 TABLET, FILM COATED ORAL at 18:33

## 2024-01-01 RX ADMIN — ENOXAPARIN SODIUM 30 MG: 30 INJECTION SUBCUTANEOUS at 11:09

## 2024-01-01 RX ADMIN — MORPHINE SULFATE 10 MG: 10 SOLUTION ORAL at 20:27

## 2024-01-01 RX ADMIN — CARBOXYMETHYLCELLULOSE SODIUM 2 DROP: 5 SOLUTION/ DROPS OPHTHALMIC at 02:28

## 2024-01-01 RX ADMIN — MORPHINE SULFATE 10 MG: 10 SOLUTION ORAL at 02:28

## 2024-01-01 RX ADMIN — CARBOXYMETHYLCELLULOSE SODIUM 2 DROP: 5 SOLUTION/ DROPS OPHTHALMIC at 15:43

## 2024-01-01 RX ADMIN — FENTANYL CITRATE 50 MCG: 50 INJECTION, SOLUTION INTRAMUSCULAR; INTRAVENOUS at 13:12

## 2024-01-01 RX ADMIN — LORAZEPAM 0.5 MG: 2 INJECTION INTRAMUSCULAR; INTRAVENOUS at 12:00

## 2024-01-01 RX ADMIN — CEFTRIAXONE SODIUM 1 G: 1 INJECTION, SOLUTION INTRAVENOUS at 11:41

## 2024-01-01 RX ADMIN — METOPROLOL TARTRATE 25 MG: 25 TABLET, FILM COATED ORAL at 18:02

## 2024-01-01 RX ADMIN — IOPAMIDOL 65 ML: 755 INJECTION, SOLUTION INTRAVENOUS at 12:14

## 2024-01-01 RX ADMIN — CEFTRIAXONE SODIUM 1 G: 1 INJECTION, SOLUTION INTRAVENOUS at 12:03

## 2024-01-01 RX ADMIN — SODIUM CHLORIDE 1000 ML: 9 INJECTION, SOLUTION INTRAVENOUS at 18:06

## 2024-01-01 RX ADMIN — MORPHINE SULFATE 10 MG: 10 SOLUTION ORAL at 04:06

## 2024-01-01 RX ADMIN — VANCOMYCIN HYDROCHLORIDE 1000 MG: 1 INJECTION, SOLUTION INTRAVENOUS at 00:06

## 2024-01-01 RX ADMIN — CEFTRIAXONE SODIUM 2 G: 2 INJECTION, SOLUTION INTRAVENOUS at 10:42

## 2024-01-01 RX ADMIN — MORPHINE SULFATE 10 MG: 10 SOLUTION ORAL at 23:29

## 2024-01-01 RX ADMIN — SODIUM CHLORIDE, PRESERVATIVE FREE: 5 INJECTION INTRAVENOUS at 14:24

## 2024-01-01 RX ADMIN — MORPHINE SULFATE 10 MG: 10 SOLUTION ORAL at 20:35

## 2024-01-01 RX ADMIN — MORPHINE SULFATE 10 MG: 10 SOLUTION ORAL at 15:39

## 2024-01-01 RX ADMIN — ACETAMINOPHEN 650 MG: 650 SUPPOSITORY RECTAL at 14:19

## 2024-01-01 RX ADMIN — METOPROLOL TARTRATE 25 MG: 25 TABLET, FILM COATED ORAL at 06:19

## 2024-01-01 RX ADMIN — FUROSEMIDE 20 MG: 10 INJECTION, SOLUTION INTRAVENOUS at 04:05

## 2024-01-01 RX ADMIN — SODIUM CHLORIDE: 9 INJECTION, SOLUTION INTRAVENOUS at 06:19

## 2024-01-01 RX ADMIN — METOPROLOL TARTRATE 25 MG: 25 TABLET, FILM COATED ORAL at 18:33

## 2024-01-01 RX ADMIN — LORAZEPAM 0.5 MG: 2 INJECTION INTRAMUSCULAR; INTRAVENOUS at 18:12

## 2024-01-01 RX ADMIN — MORPHINE SULFATE 5 MG: 10 SOLUTION ORAL at 12:26

## 2024-01-01 RX ADMIN — SODIUM CHLORIDE 1000 ML: 9 INJECTION, SOLUTION INTRAVENOUS at 15:26

## 2024-01-01 RX ADMIN — MORPHINE SULFATE 1 MG: 2 INJECTION, SOLUTION INTRAMUSCULAR; INTRAVENOUS at 00:43

## 2024-01-01 RX ADMIN — BISACODYL 10 MG: 10 SUPPOSITORY RECTAL at 10:42

## 2024-01-01 RX ADMIN — METOPROLOL TARTRATE 25 MG: 25 TABLET, FILM COATED ORAL at 18:16

## 2024-01-01 RX ADMIN — ATROPINE SULFATE 2 DROP: 10 SOLUTION/ DROPS OPHTHALMIC at 09:22

## 2024-01-01 RX ADMIN — MORPHINE SULFATE 10 MG: 10 SOLUTION ORAL at 23:04

## 2024-01-01 RX ADMIN — LORAZEPAM 0.5 MG: 2 INJECTION INTRAMUSCULAR; INTRAVENOUS at 23:39

## 2024-01-01 RX ADMIN — CARBOXYMETHYLCELLULOSE SODIUM 2 DROP: 5 SOLUTION/ DROPS OPHTHALMIC at 03:41

## 2024-01-01 RX ADMIN — SODIUM CHLORIDE: 9 INJECTION, SOLUTION INTRAVENOUS at 22:22

## 2024-01-01 RX ADMIN — MORPHINE SULFATE 10 MG: 10 SOLUTION ORAL at 13:11

## 2024-01-01 RX ADMIN — SENNOSIDES AND DOCUSATE SODIUM 2 TABLET: 8.6; 5 TABLET ORAL at 15:52

## 2024-01-01 RX ADMIN — MORPHINE SULFATE 10 MG: 10 SOLUTION ORAL at 05:14

## 2024-01-01 RX ADMIN — SODIUM CHLORIDE: 9 INJECTION, SOLUTION INTRAVENOUS at 19:34

## 2024-01-01 RX ADMIN — ACETAMINOPHEN 975 MG: 325 TABLET, FILM COATED ORAL at 11:37

## 2024-01-01 RX ADMIN — DEXTROSE MONOHYDRATE 50 ML: 25 INJECTION, SOLUTION INTRAVENOUS at 02:50

## 2024-01-01 RX ADMIN — MORPHINE SULFATE 10 MG: 10 SOLUTION ORAL at 18:24

## 2024-01-01 RX ADMIN — MORPHINE SULFATE 10 MG: 10 SOLUTION ORAL at 08:05

## 2024-01-01 RX ADMIN — MEROPENEM 1 G: 1 INJECTION, POWDER, FOR SOLUTION INTRAVENOUS at 22:43

## 2024-01-01 RX ADMIN — MORPHINE SULFATE 10 MG: 10 SOLUTION ORAL at 03:41

## 2024-01-01 RX ADMIN — LORAZEPAM 0.5 MG: 2 INJECTION INTRAMUSCULAR; INTRAVENOUS at 17:25

## 2024-01-01 RX ADMIN — POTASSIUM CHLORIDE 10 MEQ: 7.46 INJECTION, SOLUTION INTRAVENOUS at 13:12

## 2024-01-01 RX ADMIN — SODIUM CHLORIDE, PRESERVATIVE FREE: 5 INJECTION INTRAVENOUS at 07:07

## 2024-01-01 RX ADMIN — MORPHINE SULFATE 10 MG: 10 SOLUTION ORAL at 14:56

## 2024-01-01 RX ADMIN — HYDRALAZINE HYDROCHLORIDE 20 MG: 20 INJECTION INTRAMUSCULAR; INTRAVENOUS at 19:00

## 2024-01-01 RX ADMIN — DEXTROSE AND SODIUM CHLORIDE: 5; 900 INJECTION, SOLUTION INTRAVENOUS at 23:40

## 2024-01-01 RX ADMIN — SODIUM CHLORIDE: 9 INJECTION, SOLUTION INTRAVENOUS at 08:38

## 2024-01-01 RX ADMIN — CARBOXYMETHYLCELLULOSE SODIUM 2 DROP: 5 SOLUTION/ DROPS OPHTHALMIC at 18:25

## 2024-01-01 RX ADMIN — CARBOXYMETHYLCELLULOSE SODIUM 2 DROP: 5 SOLUTION/ DROPS OPHTHALMIC at 05:14

## 2024-01-01 RX ADMIN — CARBOXYMETHYLCELLULOSE SODIUM 2 DROP: 5 SOLUTION/ DROPS OPHTHALMIC at 23:29

## 2024-01-01 RX ADMIN — ACETAMINOPHEN 650 MG: 650 SUPPOSITORY RECTAL at 18:03

## 2024-01-01 RX ADMIN — MEROPENEM 1 G: 1 INJECTION, POWDER, FOR SOLUTION INTRAVENOUS at 23:30

## 2024-01-01 RX ADMIN — CEFTRIAXONE SODIUM 1 G: 1 INJECTION, SOLUTION INTRAVENOUS at 17:33

## 2024-01-01 RX ADMIN — MORPHINE SULFATE 10 MG: 10 SOLUTION ORAL at 14:19

## 2024-01-01 RX ADMIN — LORAZEPAM 0.5 MG: 2 INJECTION INTRAMUSCULAR; INTRAVENOUS at 15:38

## 2024-01-01 RX ADMIN — DEXTROSE AND SODIUM CHLORIDE: 5; 900 INJECTION, SOLUTION INTRAVENOUS at 02:51

## 2024-01-01 RX ADMIN — MORPHINE SULFATE 10 MG: 10 SOLUTION ORAL at 10:44

## 2024-01-01 RX ADMIN — LORAZEPAM 0.5 MG: 2 INJECTION INTRAMUSCULAR; INTRAVENOUS at 12:57

## 2024-01-01 RX ADMIN — ENOXAPARIN SODIUM 30 MG: 30 INJECTION SUBCUTANEOUS at 10:58

## 2024-01-01 RX ADMIN — LIDOCAINE HYDROCHLORIDE: 20 JELLY TOPICAL at 13:24

## 2024-01-01 RX ADMIN — MORPHINE SULFATE 10 MG: 10 SOLUTION ORAL at 18:18

## 2024-01-01 RX ADMIN — MORPHINE SULFATE 1 MG: 2 INJECTION, SOLUTION INTRAMUSCULAR; INTRAVENOUS at 09:29

## 2024-01-01 RX ADMIN — LORAZEPAM 0.5 MG: 2 INJECTION INTRAMUSCULAR; INTRAVENOUS at 18:19

## 2024-01-01 RX ADMIN — CARBOXYMETHYLCELLULOSE SODIUM 2 DROP: 5 SOLUTION/ DROPS OPHTHALMIC at 06:34

## 2024-01-01 RX ADMIN — LORAZEPAM 0.5 MG: 2 INJECTION INTRAMUSCULAR; INTRAVENOUS at 17:08

## 2024-01-01 RX ADMIN — ENOXAPARIN SODIUM 30 MG: 30 INJECTION SUBCUTANEOUS at 13:58

## 2024-01-01 RX ADMIN — IOPAMIDOL 52 ML: 755 INJECTION, SOLUTION INTRAVENOUS at 14:45

## 2024-01-01 RX ADMIN — ENOXAPARIN SODIUM 30 MG: 30 INJECTION SUBCUTANEOUS at 11:53

## 2024-01-01 RX ADMIN — ACETAMINOPHEN 650 MG: 650 SUPPOSITORY RECTAL at 18:27

## 2024-01-01 RX ADMIN — ACETAMINOPHEN 650 MG: 650 SUPPOSITORY RECTAL at 14:24

## 2024-01-01 RX ADMIN — MORPHINE SULFATE 10 MG: 10 SOLUTION ORAL at 06:34

## 2024-01-01 RX ADMIN — CARBOXYMETHYLCELLULOSE SODIUM 2 DROP: 5 SOLUTION/ DROPS OPHTHALMIC at 02:10

## 2024-01-01 ASSESSMENT — ACTIVITIES OF DAILY LIVING (ADL)
ADLS_ACUITY_SCORE: 67
ADLS_ACUITY_SCORE: 63
ADLS_ACUITY_SCORE: 67
CHANGE_IN_FUNCTIONAL_STATUS_SINCE_ONSET_OF_CURRENT_ILLNESS/INJURY: YES
DRESSING/BATHING: BATHING DIFFICULTY, ASSISTANCE 1 PERSON
ADLS_ACUITY_SCORE: 67
ADLS_ACUITY_SCORE: 67
ADLS_ACUITY_SCORE: 62
ADLS_ACUITY_SCORE: 67
ADLS_ACUITY_SCORE: 49
ADLS_ACUITY_SCORE: 55
ADLS_ACUITY_SCORE: 65
ADLS_ACUITY_SCORE: 51
ADLS_ACUITY_SCORE: 51
ADLS_ACUITY_SCORE: 67
ADLS_ACUITY_SCORE: 35
ADLS_ACUITY_SCORE: 39
ADLS_ACUITY_SCORE: 55
ADLS_ACUITY_SCORE: 57
ADLS_ACUITY_SCORE: 67
ADLS_ACUITY_SCORE: 55
WEAR_GLASSES_OR_BLIND: YES
ADLS_ACUITY_SCORE: 67
ADLS_ACUITY_SCORE: 67
FALL_HISTORY_WITHIN_LAST_SIX_MONTHS: NO
ADLS_ACUITY_SCORE: 65
ADLS_ACUITY_SCORE: 55
WALKING_OR_CLIMBING_STAIRS_DIFFICULTY: YES
ADLS_ACUITY_SCORE: 65
ADLS_ACUITY_SCORE: 67
WALKING_OR_CLIMBING_STAIRS: TRANSFERRING DIFFICULTY, ASSISTANCE 1 PERSON
ADLS_ACUITY_SCORE: 55
ADLS_ACUITY_SCORE: 35
ADLS_ACUITY_SCORE: 62
ADLS_ACUITY_SCORE: 67
ADLS_ACUITY_SCORE: 55
ADLS_ACUITY_SCORE: 55
ADLS_ACUITY_SCORE: 67
ADLS_ACUITY_SCORE: 57
ADLS_ACUITY_SCORE: 63
ADLS_ACUITY_SCORE: 67
ADLS_ACUITY_SCORE: 67
ADLS_ACUITY_SCORE: 63
ADLS_ACUITY_SCORE: 41
DOING_ERRANDS_INDEPENDENTLY_DIFFICULTY: YES
ADLS_ACUITY_SCORE: 65
ADLS_ACUITY_SCORE: 35
ADLS_ACUITY_SCORE: 51
ADLS_ACUITY_SCORE: 64
ADLS_ACUITY_SCORE: 54
ADLS_ACUITY_SCORE: 51
ADLS_ACUITY_SCORE: 63
ADLS_ACUITY_SCORE: 64
ADLS_ACUITY_SCORE: 67
ADLS_ACUITY_SCORE: 64
ADLS_ACUITY_SCORE: 51
TOILETING: 2-->COMPLETELY DEPENDENT (NOT DEVELOPMENTALLY APPROPRIATE)
ADLS_ACUITY_SCORE: 65
ADLS_ACUITY_SCORE: 55
ADLS_ACUITY_SCORE: 67
ADLS_ACUITY_SCORE: 67
ADLS_ACUITY_SCORE: 55
ADLS_ACUITY_SCORE: 39
ADLS_ACUITY_SCORE: 55
ADLS_ACUITY_SCORE: 41
ADLS_ACUITY_SCORE: 67
ADLS_ACUITY_SCORE: 51
ADLS_ACUITY_SCORE: 55
ADLS_ACUITY_SCORE: 67
ADLS_ACUITY_SCORE: 55
ADLS_ACUITY_SCORE: 63
ADLS_ACUITY_SCORE: 67
ADLS_ACUITY_SCORE: 67
ADLS_ACUITY_SCORE: 66
ADLS_ACUITY_SCORE: 62
ADLS_ACUITY_SCORE: 57
ADLS_ACUITY_SCORE: 67
ADLS_ACUITY_SCORE: 55
ADLS_ACUITY_SCORE: 67
ADLS_ACUITY_SCORE: 67
ADLS_ACUITY_SCORE: 57
ADLS_ACUITY_SCORE: 63
ADLS_ACUITY_SCORE: 66
ADLS_ACUITY_SCORE: 67
ADLS_ACUITY_SCORE: 63
ADLS_ACUITY_SCORE: 35
ADLS_ACUITY_SCORE: 35
ADLS_ACUITY_SCORE: 67
ADLS_ACUITY_SCORE: 35
ADLS_ACUITY_SCORE: 55
ADLS_ACUITY_SCORE: 57
ADLS_ACUITY_SCORE: 63
ADLS_ACUITY_SCORE: 67
DIFFICULTY_COMMUNICATING: YES
ADLS_ACUITY_SCORE: 67
ADLS_ACUITY_SCORE: 55
ADLS_ACUITY_SCORE: 67
ADLS_ACUITY_SCORE: 67
ADLS_ACUITY_SCORE: 65
DESCRIBE_HEARING_LOSS: BILATERAL HEARING LOSS
ADLS_ACUITY_SCORE: 49
ADLS_ACUITY_SCORE: 67
ADLS_ACUITY_SCORE: 65
ADLS_ACUITY_SCORE: 67
ADLS_ACUITY_SCORE: 55
ADLS_ACUITY_SCORE: 51
ADLS_ACUITY_SCORE: 65
ADLS_ACUITY_SCORE: 67
ADLS_ACUITY_SCORE: 55
ADLS_ACUITY_SCORE: 67
ADLS_ACUITY_SCORE: 67
ADLS_ACUITY_SCORE: 63
ADLS_ACUITY_SCORE: 39
CONCENTRATING,_REMEMBERING_OR_MAKING_DECISIONS_DIFFICULTY: YES
ADLS_ACUITY_SCORE: 67
ADLS_ACUITY_SCORE: 51
ADLS_ACUITY_SCORE: 67
EATING/SWALLOWING: EATING;SWALLOWING LIQUIDS
ADLS_ACUITY_SCORE: 67
ADLS_ACUITY_SCORE: 65
ADLS_ACUITY_SCORE: 67
ADLS_ACUITY_SCORE: 51
ADLS_ACUITY_SCORE: 55
ADLS_ACUITY_SCORE: 67
ADLS_ACUITY_SCORE: 67
ADLS_ACUITY_SCORE: 55
ADLS_ACUITY_SCORE: 67
HEARING_DIFFICULTY_OR_DEAF: YES
ADLS_ACUITY_SCORE: 67
ADLS_ACUITY_SCORE: 67
ADLS_ACUITY_SCORE: 41
ADLS_ACUITY_SCORE: 51
ADLS_ACUITY_SCORE: 57
TOILETING_ASSISTANCE: TOILETING DIFFICULTY, REQUIRES EQUIPMENT;TOILETING DIFFICULTY, ASSISTANCE 1 PERSON
ADLS_ACUITY_SCORE: 67
ADLS_ACUITY_SCORE: 55
ADLS_ACUITY_SCORE: 64
ADLS_ACUITY_SCORE: 51
ADLS_ACUITY_SCORE: 62
ADLS_ACUITY_SCORE: 67
ADLS_ACUITY_SCORE: 55
ADLS_ACUITY_SCORE: 63
ADLS_ACUITY_SCORE: 55
ADLS_ACUITY_SCORE: 67
ADLS_ACUITY_SCORE: 67
ADLS_ACUITY_SCORE: 64
ADLS_ACUITY_SCORE: 67
ADLS_ACUITY_SCORE: 55
ADLS_ACUITY_SCORE: 65
ADLS_ACUITY_SCORE: 67
ADLS_ACUITY_SCORE: 49
WERE_AUXILIARY_AIDS_OFFERED?: YES
ADLS_ACUITY_SCORE: 67
ADLS_ACUITY_SCORE: 51
ADLS_ACUITY_SCORE: 35
TOILETING_ISSUES: YES
ADLS_ACUITY_SCORE: 55
ADLS_ACUITY_SCORE: 51
ADLS_ACUITY_SCORE: 51
ADLS_ACUITY_SCORE: 67
DIFFICULTY_EATING/SWALLOWING: YES
ADLS_ACUITY_SCORE: 67
ADLS_ACUITY_SCORE: 65
ADLS_ACUITY_SCORE: 67
ADLS_ACUITY_SCORE: 65
ADLS_ACUITY_SCORE: 63
ADLS_ACUITY_SCORE: 63
ADLS_ACUITY_SCORE: 55
ADLS_ACUITY_SCORE: 67
ADLS_ACUITY_SCORE: 41
ADLS_ACUITY_SCORE: 67
ADLS_ACUITY_SCORE: 51
ADLS_ACUITY_SCORE: 63
ADLS_ACUITY_SCORE: 51
ADLS_ACUITY_SCORE: 55
ADLS_ACUITY_SCORE: 67
ADLS_ACUITY_SCORE: 35
ADLS_ACUITY_SCORE: 67
ADLS_ACUITY_SCORE: 67
ADLS_ACUITY_SCORE: 55
ADLS_ACUITY_SCORE: 67
ADLS_ACUITY_SCORE: 67
ADLS_ACUITY_SCORE: 55
ADLS_ACUITY_SCORE: 66
ADLS_ACUITY_SCORE: 67
ADLS_ACUITY_SCORE: 65
VISION_MANAGEMENT: GLASSES
ADLS_ACUITY_SCORE: 49
ADLS_ACUITY_SCORE: 67
ADLS_ACUITY_SCORE: 64
THE_FOLLOWING_AIDS_WERE_PROVIDED;: POCKET TALKER
ADLS_ACUITY_SCORE: 62
ADLS_ACUITY_SCORE: 67
PATIENT'S_PREFERRED_MEANS_OF_COMMUNICATION: OTHER
ADLS_ACUITY_SCORE: 41
ADLS_ACUITY_SCORE: 51
ADLS_ACUITY_SCORE: 55
ADLS_ACUITY_SCORE: 67
DRESSING/BATHING_DIFFICULTY: YES
ADLS_ACUITY_SCORE: 67
ADLS_ACUITY_SCORE: 41
ADLS_ACUITY_SCORE: 55
ADLS_ACUITY_SCORE: 67
ADLS_ACUITY_SCORE: 55
ADLS_ACUITY_SCORE: 67
ADLS_ACUITY_SCORE: 63
ADLS_ACUITY_SCORE: 67
ADLS_ACUITY_SCORE: 63
ADLS_ACUITY_SCORE: 67
NUMBER_OF_TIMES_PATIENT_HAS_FALLEN_WITHIN_LAST_SIX_MONTHS: 0
ADLS_ACUITY_SCORE: 55
ADLS_ACUITY_SCORE: 62
ADLS_ACUITY_SCORE: 64
ADLS_ACUITY_SCORE: 55
ADLS_ACUITY_SCORE: 55
TOILETING: 2-->COMPLETELY DEPENDENT
ADLS_ACUITY_SCORE: 55
ADLS_ACUITY_SCORE: 64
ADLS_ACUITY_SCORE: 67
ADLS_ACUITY_SCORE: 55
ADLS_ACUITY_SCORE: 62
ADLS_ACUITY_SCORE: 55
ADLS_ACUITY_SCORE: 67
ADLS_ACUITY_SCORE: 63

## 2024-01-01 ASSESSMENT — COLUMBIA-SUICIDE SEVERITY RATING SCALE - C-SSRS
IS THE PATIENT NOT ABLE TO COMPLETE C-SSRS: UNABLE TO VERBALIZE
IS THE PATIENT NOT ABLE TO COMPLETE C-SSRS: UNRESPONSIVE

## 2024-01-01 ASSESSMENT — PAIN SCALES - GENERAL: PAINLEVEL: WORST PAIN (10)

## 2024-01-23 NOTE — ED PROVIDER NOTES
[facility]  ED Provider Note    Chief Complaint   Patient presents with    Abdominal Pain     History:  Bianca Greene is a 89 year old female with hearing loss and dementia and loosening of the hardware in the right hip being [facility] presents to the emergency department today with concern for abdominal pain, and concern for UTI.  Patient is unable to provide history, accompanied by family to provide history.  They state that the using minimal opiates because of the effects that it has on her dementia.  They are concerned about her abdomen, and she keeps bringing her knees up to her chest.  No reports of fevers.  Stools have been hard with some leakage    Review of Systems   Performed; see HPI for pertinent positives and negatives.     Medical history, surgical history, and social history was reviewed.  Nursing documentation, triage note, and vitals were reviewed.    Vitals:  BP: 130/92  Pulse: 85  Temp: 99.9  F (37.7  C)  Resp: 22  SpO2: 95 %    Physical Exam:  Constitutional: Awake and confused  HENT: NCAT   Eyes: Normal pupils   Neck: supple   CV: No pallor  Pulmonary/Chest: Non-labored respirations  Abdominal: Left lower quadrant abdominal tenderness  MSK: CHEEMA.   Neuro: Alert and appropriate   Skin: Warm and dry. No diaphoresis. No rashes on exposed skin    Psych: Appropriate mood and affect       MDM:      ED Course as of 01/23/24 1504   Tue Jan 23, 2024   1111 Differential includes but is not limited to appendicitis, cholecystitis, pancreatitis, gastroesophageal reflux disease, gastritis, nephrolithiasis, pyelonephritis, urinary tract infection, ovarian cyst, ovarian torsion, ectopic pregnancy, mesenteric ischemia, strangulated hernia, aortic aneurysm.      1111 Labs and imaging ordered.  Results overall reassuring, no leukocytosis suggest major infectious etiology beyond the notable UTI.  Hemoglobin unremarkable.  CMP reassuring.  Negative lipase normal  lactate doubt mesenteric ischemia.  Vitals remained reassuring throughout.  CT with significant stool burden.  Abdominal pain likely secondary to constipation   1136 Nitrite Urine(!): Positive  Ceftriaxone ordered   1348 Pt disimpacted    1404 Enema completed   1404 Pt discharged. Mag cit at home and abx after       Impression:  Final diagnoses:   Fecal impaction (H)   Acute cystitis with hematuria   Constipation, unspecified constipation type          Jose Alberts MD  01/23/24 150

## 2024-01-23 NOTE — DISCHARGE INSTRUCTIONS
Please use the first half dose of magnesium citrate when you get home.  Drink lots of water.  If it has been 2 to 3 hours without any results from magnesium citrate, drink the other half.  Start the antibiotics after the diarrhea from the magnesium citrate is improved.     Return the emergency department for worsening symptoms or new concerning symptoms.  Follow-up with primary care provider within the next week.  Call today for an appointment.     After clearing her out with the magnesium citrate, please start the next day taking MiraLAX 1 capful twice per day.  You can add Senokot tablets if you are not having improvement in your symptoms by the next day.  You take anywhere from 1 tablet once a day to 2 tablets twice a day.  Your goal is to improve your stool frequency and consistency to acceptable level.  Follow-up with your primary care provider to discuss the success of these treatments and any further treatment they might recommend

## 2024-01-23 NOTE — ED NOTES
Care Transitions focused note:      Chart reviewed, met with patient and family who accompanied her to the ED.  Pt lives with family in [place].  Family did have her placed at one point but patient kept falling and had 2 broken hips.  They did not feel there was enough staff to care for her so they have her at home and have been providing 24 hour care for her. They feel at this time they are able to continue to provide care for her.  This past week she has seemed to be in more pain so she is coming to be evaluated for UTI and constipation.    Pt does have dementia and is unable to have any meaningful conversation about her condition.    Medical workup is pending.    Will follow    MARGY Herring

## 2024-01-23 NOTE — ED NOTES
Patient incontinent of bowels and bladder. Straight cath'd for urine due to incontinence and inability to stand. Patient tolerated poorly, but sample of cloudy odorous urine obtained. Small amt of brown loose stool noted as well. Brief changed, linens changed. Family choosing to keep patient in gown at this time. Call light within reach.

## 2024-01-23 NOTE — ED NOTES
Patient and family given written and verbal discharge instructions, verbalized understanding. All questions answered, no concerns. Patient left via wheelchair to home via [facility].

## 2024-01-23 NOTE — ED NOTES
Care Transitions focused note:      [facility] transportation at 3:30 pm.  Family will assist getting patient into the home.  Pt is resting comfortably.    Updated staff.    MARGY Herring

## 2024-01-23 NOTE — ED NOTES
Soap suds enema completed. Patient unable to hold fluid in. Tolerated well, brief changed. Continues to have very small Bms.

## 2024-01-23 NOTE — ED TRIAGE NOTES
Pt presents via [facility]. Pt poor historian due to dementia. Per EMS pt's family concerned for constipation and UTI. Grimaces with abdominal palpation. Also reports rectum appears hard, concerns for impaction.

## 2024-01-26 PROBLEM — R41.0 DELIRIUM: Status: ACTIVE | Noted: 2024-01-01

## 2024-01-26 PROBLEM — N39.0 URINARY TRACT INFECTION IN ELDERLY PATIENT: Status: ACTIVE | Noted: 2024-01-01

## 2024-01-26 NOTE — ED NOTES
Bed: ED03  Expected date:   Expected time:   Means of arrival:   Comments:  Gera  89F UTI&Semi-Responsive

## 2024-01-26 NOTE — ED PROVIDER NOTES
History     Chief Complaint   Patient presents with    Altered Mental Status     HPI  Bianca Greene is a 89 year old female who is obtunded and unable to provide history.  History comes from EMS as well as the patient's son and daughter who are present.  I see in [facility] that the patient was here in January 23, diagnosed with fecal impaction and urinary tract infection.  Urine culture grew greater than 100,000 colonies of E. coli that was sensitive to the Keflex on which she was discharged.  Family states that she was fine the rest of that day, slept significantly the following day and then yesterday was up awake and talking about a lot of future plans etc.  At baseline she does not ambulate, she has dementia but is conversant.  She began to hallucinate yesterday evening, talking about pine needles in the house and being confused.  13 hours prior to presentation, the patient fell out of her chair and smashed her face on the ground.  There was no reported loss of consciousness.  She is on aspirin and I do not see any anticoagulation.  She has been somnolent today and has not been speaking the last several hours.  There has been no emesis or other changes in health apart from decreased urination.    Allergies:  No Known Allergies    Problem List:    Patient Active Problem List    Diagnosis Date Noted    Delirium 01/26/2024     Priority: Medium    Urinary tract infection in elderly patient 01/26/2024     Priority: Medium    Dementia without behavioral disturbance (H) 05/02/2022     Priority: Medium    Bilateral hearing loss 04/28/2022     Priority: Medium    Hypotension, unspecified 04/28/2022     Priority: Medium    Vascular dementia without behavioral disturbance (H) 04/28/2022     Priority: Medium    Acute metabolic encephalopathy 04/27/2022     Priority: Medium    Closed left hip fracture (H) 04/27/2022     Priority: Medium    Hypertensive disorder 04/27/2022     Priority: Medium    Sepsis (H) 10/31/2019      Priority: Medium    Acute cystitis 10/30/2019     Priority: Medium    SNHL (sensorineural hearing loss) 06/30/2014     Priority: Medium    Tinnitus 06/30/2014     Priority: Medium    Impacted cerumen 06/30/2014     Priority: Medium    Venous stasis ulcer (H)      Priority: Medium    Senile nuclear sclerosis 04/19/2012     Priority: Medium        Past Medical History:    Past Medical History:   Diagnosis Date    Abdominal pain, left lower quadrant 5/14/2002    Chest pain, unspecified 6/26/2007    Other screening mammogram 5/22/2002    Venous stasis ulcer (H)        Past Surgical History:    Past Surgical History:   Procedure Laterality Date    EYE SURGERY  2010    Bilateral cataracts    HYSTERECTOMY      left hip replacement  2009    RIGHT hip replacement per pt    TONSILLECTOMY         Family History:    Family History   Problem Relation Age of Onset    Heart Disease Father         heart disease; cause of death    Dementia Mother 89        cause of death    Osteoporosis Mother     Cancer - colorectal Sister     Cancer Son 54        brain    Cancer Sister 62        leukemia; cause of death    Cerebrovascular Disease Son     Suicide Brother        Social History:  Marital Status:   [5]  Social History     Tobacco Use    Smoking status: Never    Smokeless tobacco: Never   Substance Use Topics    Alcohol use: No    Drug use: No        Medications:    ammonium lactate (AMLACTIN) 12 % external cream  cephALEXin (KEFLEX) 500 MG capsule  oxyCODONE-acetaminophen 2.5-325 MG TABS          Review of Systems   Unable to perform ROS: Mental status change       Physical Exam   BP: (!) 190/120  Pulse: 84  Temp: 98.9  F (37.2  C)  Resp: 14  Weight: 60.8 kg (134 lb 0.6 oz)  SpO2: 97 %      Physical Exam  Vitals and nursing note reviewed.   Constitutional:       General: She is not in acute distress.     Appearance: She is well-developed.   HENT:      Head: Normocephalic.   Eyes:      Conjunctiva/sclera: Conjunctivae normal.       Pupils: Pupils are equal, round, and reactive to light.      Comments: Ecchymosis surrounding left orbit   Cardiovascular:      Rate and Rhythm: Normal rate and regular rhythm.      Heart sounds: Normal heart sounds.   Pulmonary:      Effort: Pulmonary effort is normal. No respiratory distress.      Breath sounds: Normal breath sounds. No wheezing or rales.   Abdominal:      General: There is no distension.      Palpations: Abdomen is soft.      Tenderness: There is no abdominal tenderness.   Musculoskeletal:         General: No deformity or signs of injury.      Cervical back: Normal range of motion and neck supple.      Right lower leg: No edema.      Left lower leg: No edema.   Skin:     General: Skin is warm and dry.   Neurological:      Mental Status: She is alert.      Comments: Patient does not respond to voice.  She does open her eyes spontaneously and look around, she does not follow commands.         ED Course      Patient improved considerably with IV hydration.  We did not obtain another urine sample since we already know culture and sensitivity from the other day.  I have given her a dose of ceftriaxone since she has not taken any of her Keflex today.  She is now awake, able to use a bedpan but still delirious calling out for relatives who are .  Given the presence of delirium in the setting of urinary tract infection and dehydration and not taking oral medication, I would like to admit her to the hospital for observation           Procedures              EKG Interpretation:      Interpreted by Brady Bianchi MD  Time reviewed: 1440  Symptoms at time of EKG: Obtunded  Rhythm: Atrial fibrillation  Rate: normal  Axis: normal  Ectopy: none  Conduction: normal  ST Segments/ T Waves: Nonspecific ST-T wave changes with some tall T waves in V2  Q Waves: none  Comparison to prior: No old EKG available    Clinical Impression: normal EKG           Results for orders placed or performed during the  hospital encounter of 01/26/24 (from the past 24 hour(s))   EKG 12-lead, tracing only   Result Value Ref Range    Systolic Blood Pressure  mmHg    Diastolic Blood Pressure  mmHg    Ventricular Rate 84 BPM    Atrial Rate  BPM    ME Interval  ms    QRS Duration 76 ms     ms    QTc 467 ms    P Axis  degrees    R AXIS 46 degrees    T Axis 100 degrees    Interpretation ECG       Atrial fibrillation  Nonspecific ST and T wave abnormality  Abnormal ECG  No previous ECGs available     CBC with platelets differential    Narrative    The following orders were created for panel order CBC with platelets differential.  Procedure                               Abnormality         Status                     ---------                               -----------         ------                     CBC with platelets and d...[[id number]]  Abnormal            Final result                 Please view results for these tests on the individual orders.   Basic metabolic panel   Result Value Ref Range    Sodium 140 135 - 145 mmol/L    Potassium 4.1 3.4 - 5.3 mmol/L    Chloride 107 98 - 107 mmol/L    Carbon Dioxide (CO2) 21 (L) 22 - 29 mmol/L    Anion Gap 12 7 - 15 mmol/L    Urea Nitrogen 16.7 8.0 - 23.0 mg/dL    Creatinine 0.94 0.51 - 0.95 mg/dL    GFR Estimate 56 (L) >60 mL/min/1.73m2    Calcium 9.5 8.2 - 9.6 mg/dL    Glucose 99 70 - 99 mg/dL   Lactic acid whole blood   Result Value Ref Range    Lactic Acid 1.0 0.7 - 2.0 mmol/L   Troponin T, High Sensitivity   Result Value Ref Range    Troponin T, High Sensitivity 29 (H) <=14 ng/L   CBC with platelets and differential   Result Value Ref Range    WBC Count 4.9 4.0 - 11.0 10e3/uL    RBC Count 4.87 3.80 - 5.20 10e6/uL    Hemoglobin 10.4 (L) 11.7 - 15.7 g/dL    Hematocrit 34.9 (L) 35.0 - 47.0 %    MCV 72 (L) 78 - 100 fL    MCH 21.4 (L) 26.5 - 33.0 pg    MCHC 29.8 (L) 31.5 - 36.5 g/dL    RDW 21.2 (H) 10.0 - 15.0 %    Platelet Count 281 150 - 450 10e3/uL    % Neutrophils 69 %    % Lymphocytes  20 %    % Monocytes 7 %    % Eosinophils 3 %    % Basophils 1 %    % Immature Granulocytes 0 %    NRBCs per 100 WBC 0 <1 /100    Absolute Neutrophils 3.3 1.6 - 8.3 10e3/uL    Absolute Lymphocytes 1.0 0.8 - 5.3 10e3/uL    Absolute Monocytes 0.4 0.0 - 1.3 10e3/uL    Absolute Eosinophils 0.1 0.0 - 0.7 10e3/uL    Absolute Basophils 0.1 0.0 - 0.2 10e3/uL    Absolute Immature Granulocytes 0.0 <=0.4 10e3/uL    Absolute NRBCs 0.0 10e3/uL   Extra Tube    Narrative    The following orders were created for panel order Extra Tube.  Procedure                               Abnormality         Status                     ---------                               -----------         ------                     Extra Blue Top Tube[[id number]]                              Final result               Extra Red Top Tube[[id number]]                               Final result                 Please view results for these tests on the individual orders.   Extra Blue Top Tube   Result Value Ref Range    Hold Specimen JIC    Extra Red Top Tube   Result Value Ref Range    Hold Specimen JIC    CT Head w/o Contrast    Narrative    Exam: CT HEAD W/O CONTRAST    Clinical history:89 years Female AMS  ??13 hours prior to  presentation, the patient fell out of her chair and smashed her face  on the ground. ?There was no reported loss of consciousness.??She is  on aspirin and I do not see any anticoagulation.       Comparisons: 4/5/2023    Technique: Axial CT imaging of the head was performed Without  intervenous contrast.  This exam was performed using one or more of the following dose  reduction techniques:  Automated exposure control, adjustment of the PACO and/or KV according  to patient's size, and/or use of iterative reconstruction technique.    FINDINGS: There is generalized cerebral and cerebellar volume loss.   Ventricles and sulci are symmetric. The gray-white matter  differentiation throughout the brain is well maintained. There is  advanced  periventricular white matter change of chronic small vessel  ischemic disease. There is no evidence of intracranial mass or  hemorrhage. Visualized portions of the paranasal sinuses and mastoid  air cells are well aerated. There is no evidence of skull fracture.      Impression    IMPRESSION: No evidence of skull fracture or intracranial hemorrhage.    JOSE SWANSON MD         SYSTEM ID:  [id number]   CT Orbits wo Contrast    Narrative    CT ORBITAL W/O CONTRAST    HISTORY: 89 years Female Trauma    COMPARISON: None    TECHNIQUE: Axial CT imaging of the facial bones was performed. Coronal  sagittal reconstructions were obtained.    FINDINGS: The paranasal sinuses are clear. There is no evidence of  facial fracture. There is no evidence of orbital fracture.      Impression    IMPRESSION: Negative study.    JOSE SWANSON MD         SYSTEM ID:  [id number]       Medications   sodium chloride 0.9% BOLUS 1,000 mL (1,000 mLs Intravenous $New Bag 1/26/24 1806)   hydrALAZINE (APRESOLINE) injection 20 mg (has no administration in time range)   sodium chloride 0.9% BOLUS 1,000 mL (0 mLs Intravenous Stopped 1/26/24 1631)   cefTRIAXone in d5w (ROCEPHIN) intermittent infusion 1 g (0 g Intravenous Stopped 1/26/24 1806)       Assessments & Plan (with Medical Decision Making)     I have reviewed the nursing notes.    I have reviewed the findings, diagnosis, plan and need for follow up with the patient.        Medical Decision Making  The patient's presentation was of high complexity (an acute health issue posing potential threat to life or bodily function).    The patient's evaluation involved:  ordering and/or review of 3+ test(s) in this encounter (see separate area of note for details)    The patient's management necessitated high risk (a decision regarding hospitalization).        New Prescriptions    No medications on file       Final diagnoses:   Delirium   Urinary tract infection in elderly patient       1/26/2024    [facility]       Brady Bianchi MD  01/26/24 7477

## 2024-01-26 NOTE — ED NOTES
DTR presented to room who reports patient fell out of chair around 0100 AM.     Denies any use of blood thinners.     Patient current on Cephalexin 500 mg for UTI

## 2024-01-26 NOTE — ED TRIAGE NOTES
Patient presents via EMS with altered mental status. EMS reports that patient was diagnosed with UTI earlier in the week. Yesterday was acting normal and chatty per EMS/family, until last night around 1800. Patient started to hallucinate and became week. When EMS arrived on scene, EMS reports no rise and fall of chest, DTR screamed with caused patient to resume consciousness and breathing. Patient has bruising around left eye, with dried blood noted. Patient opens eyes to verbal stimuli, but does not answer yes or no questions.

## 2024-01-27 NOTE — PROVIDER NOTIFICATION
Notified hospitalist and primary nurse patient HR elevate with activity Afib 140-170s, does come down after a couple minutes 90-120s. MD ordered metoprolol.

## 2024-01-27 NOTE — PHARMACY
Pharmacy Antimicrobial Stewardship Assessment     Current Antimicrobial Therapy:  Anti-infectives (From now, onward)      Start     Dose/Rate Route Frequency Ordered Stop    24 0900  cefTRIAXone IN D5W (ROCEPHIN) intermittent infusion 2 g         2 g  100 mL/hr over 30 Minutes Intravenous EVERY 24 HOURS 24 1923              Indication: UTI    Days of Therapy: 2  (Day 5 of Antibiotics - received Rocephin x1 on , then was given cephalexin outpatient)               Pertinent Labs:  Recent Labs   Lab Test 24  0452 24  1508 24  1113   WBC 4.5 4.9 8.5     Recent Labs   Lab Test 24  1508 24  1113   LACT 1.0 1.6        Temperature:  Temp (24hrs), Av.6  F (37  C), Min:98  F (36.7  C), Max:99  F (37.2  C)    Culture Results:     Collected Updated Procedure Result Status    2024 1053 2024 0753 Urine Culture [[id number]]    (Abnormal)   Urine, Catheter    Preliminary result Component Value   Culture >100,000 CFU/mL Escherichia coli Abnormal  P    >100,000 CFU/mL Alpha hemolytic Streptococcus Abnormal  P    Sen to IDDL for identification.       Susceptibility     Escherichia coli     RAIZA     Amoxicillin/Clavulanate 4 Susceptible *     Ampicillin 4 Susceptible     Ampicillin/ Sulbactam 4 Susceptible     Cefazolin <=4 Susceptible     Cefepime <=1 Susceptible     Ceftazidime <=1 Susceptible     Ceftriaxone <=1 Susceptible     Ciprofloxacin >=4 Resistant     Ertapenem <=0.5 Susceptible     Extended Spectrum Beta-Lactamase Negative ESBL Negative *     Gentamicin <=1 Susceptible     Imipenem <=0.25 Susceptible     Levofloxacin >=8 Resistant     Nitrofurantoin <=16 Susceptible     Piperacillin/Tazobactam <=4 Susceptible     Tobramycin <=1 Susceptible     Trimethoprim/Sulfamethoxazole <=1/19 Susceptible                      Recommendations/Interventions:  1. None at this time.    Mell Navarrete RPH  2024

## 2024-01-27 NOTE — PROGRESS NOTES
01/27/24 1100   Appointment Info   Signing Clinician's Name / Credentials (PT) Juan Gonsalves DPT   Rehab Comments (PT) Upon approach, pt was sleeping in bed with son Jamel at bedside and daughter Selene arrived shortly after.  Family stated okay to wake pt and she did rouse with mod verbal stimulus.  Pt was then agreeable to PT eval.   Quick Adds   Quick Adds Certification   Living Environment   People in Home child(monique), adult   Current Living Arrangements house   Home Accessibility no concerns   Transportation Anticipated health plan transportation   Living Environment Comments This info obtained from son and daughter d/t pt's impaired cognition. Pt lives with her son in a 1 level home with no steps to enter.  Walk in shower with grab bars and chair.  Family typically calls for medical transport for any medical appointments.   Self-Care   Usual Activity Tolerance fair   Current Activity Tolerance poor   Regular Exercise No   Equipment Currently Used at Home grab bar, tub/shower;wheelchair, manual;shower chair;walker, rolling   Fall history within last six months yes   Number of times patient has fallen within last six months   (At least this 1 resulting in currently hospitalization.)   Activity/Exercise/Self-Care Comment This info obtained from son and daughter d/t pt's impaired cognition.  Family stated pt was able to walk around the home with CGA and FWW up until about 2 months ago when her R hip hardware began to deteriorate causing significant pain.  Family stated pt has seen ortho, but no surgery planned unless an acute fx occurs.  Per family, ortho did request pt sleep in a recliner and recently pt has only been able to complete pivot transfers recliner<->commode with family assist.  Family assists with all ADLs.   General Information   Onset of Illness/Injury or Date of Surgery 01/26/24   Referring Physician Dr. Whitlock   Patient/Family Therapy Goals Statement (PT) Pt unable to state goals, but family  would prefer to take pt home and also requesting to have homecare PT/OT again as they felt pt really benefitted from it last time.   Pertinent History of Current Problem (include personal factors and/or comorbidities that impact the POC) Pt seen in ED on 1/23/24 for UTI and now admitted d/t having a fall yesterday hitting her head (brain and head CT negative for any acute changes) as well as for complicated UTI  with acute on chronic metabolic encephalopathy.  Pt also has existing dx of dementia.   Cognition   Affect/Mental Status (Cognition) low arousal/lethargic  (Did improve some one sitting up.)   Orientation Status (Cognition) unable/difficult to assess  (d/t pt's impaired cognition and pt verbalizing only very occasionally.)   Follows Commands (Cognition) other (see comments)  (Once more alert, and with some assist from dtr, pt was able to follow simple commands about 50% of the time.)   Safety Deficit (Cognition) impulsivity;insight into deficits/self-awareness;judgment   Memory Deficit (Cognition) unable/difficult to assess   Pain Assessment   Patient Currently in Pain Yes, see Vital Sign flowsheet  (Pt unable to specify, but did nod arun when dtr asking pt if her legs hurt.)   Integumentary/Edema   Integumentary/Edema Comments Pt with multiple bruises on L knee/lower leg and more numerous/scattered bruising on both forearms/hands.  Pt with significant bruising and swelling around R eye to where it was barely able to open.   Posture    Posture Forward head position;Protracted shoulders;Kyphosis  (Forward flexed)   Range of Motion (ROM)   ROM Comment LE AROM only min impaired throughout and UE AROM only mildly impaired overall   Strength (Manual Muscle Testing)   Strength (Manual Muscle Testing) Deficits observed during functional mobility   Bed Mobility   Bed Mobility supine-sit   Supine-Sit [place] (Bed Mobility) moderate assist (50% patient effort);minimum assist (75% patient effort)   Bed Mobility  Limitations cognitive deficits;decreased ability to use arms for pushing/pulling;decreased ability to use legs for bridging/pushing;impaired ability to control trunk for mobility   Impairments Contributing to Impaired Bed Mobility decreased strength   Assistive Device (Bed Mobility) bed rails   Comment, (Bed Mobility) WIth HOB elevated and good effort from pt.   Transfers   Transfers bed-chair transfer;sit-stand transfer   Impairments Contributing to Impaired Transfers impaired balance;pain;decreased strength;impaired postural control   Bed-Chair Transfer   Assistive Device (Bed-Chair Transfers) walker, front-wheeled   Bed-Chair [place] (Transfers) 1 person assist;1 person to manage equipment;minimum assist (75% patient effort);verbal cues   Comment, (Bed-Chair Transfer) Once on her feet, pt transferred bed to adjacent recliner Keon with FWW and mod/much antalgic on RLE.  When pt got near recliner she tended to reach for it and pivot/swing in despite cues from PT to keep holding on to FWW while standing and turning.  Dtr stated this is normal for pt d/t her R hip pain.   Sit-Stand Transfer   Sit-Stand [place] (Transfers) minimum assist (75% patient effort);1 person assist   Assistive Device (Sit-Stand Transfers) walker, front-wheeled   Comment, (Sit-Stand Transfer) Pt moved sit->stand from bed up to FWW multiple times and from recliner up to FWW once each with [place] and cues for proper hand placement, but pt not comprehending well and tends to want to pull herself up holding on to FWW.  Pt moved stand->sit with Ke[place] to help control descent.  Pt was able to scoot forward and back in recliner on her own.   Gait/Stairs (Locomotion)   Comment, (Gait/Stairs) Pt unable to amb today d/t R hip pain.   Balance   Balance Comments Sitting balance = Fair+ with BUE support on bed.  Standing balance = Poor+ with FWW support.   Sensory Examination   Sensory Perception Comments Pt unable to comprehend questioning/testing.    Coordination   Coordination Comments Appeared grossly WFL BLEs and BUEs.   Muscle Tone   Muscle Tone Comments No obvious hyper/hypotonicity   Clinical Impression   Criteria for Skilled Therapeutic Intervention Yes, treatment indicated   PT Diagnosis (PT) Generalized weakness   Influenced by the following impairments R hip pain; functional LE weakness; balance impairment; decreased activity tolerance.   Functional limitations due to impairments Bed mob, transfers, and gait   Clinical Presentation (PT Evaluation Complexity) stable   Clinical Presentation Rationale Clinical judgment   Clinical Decision Making (Complexity) low complexity   Planned Therapy Interventions (PT) balance training;bed mobility training;cryotherapy;gait training;home exercise program;manual therapy techniques;neuromuscular re-education;patient/family education;ROM (range of motion);strengthening;stretching;transfer training;progressive activity/exercise   Risk & Benefits of therapy have been explained evaluation/treatment results reviewed;care plan/treatment goals reviewed;risks/benefits reviewed;current/potential barriers reviewed;participants voiced agreement with care plan;participants included;patient;daughter;son   Clinical Impression Comments PT eval completed and although pt was quite confused she was able to follow basic mobility commands with some repeating and assist from dtr.  Pt does present with severe impairment of mobility overall, but only mildly impaired compared to reported recent baseline, but pt would benefit from skilled PT services to help improve LE strength/endurance for greater ease with bed mob and transfers.   PT Total Evaluation Time   PT Lee Low Complexity Minutes (83435) 24   Therapy Certification   Start of care date 01/27/24   Certification date from 01/27/24   Certification date to 02/03/24   Medical Diagnosis UTI in elderly patient   Physical Therapy Goals   PT Frequency 6x/week   PT Predicted  Duration/Target Date for Goal Attainment 02/03/24   PT Goals Transfers;Bed Mobility;Gait   PT: Bed Mobility Supervision/stand-by assist;Supine to/from sit   PT: Transfers Assistive device;Bed to/from chair;Sit to/from stand  (CGA)   PT: Gait 10 feet;Minimal assist;Rolling walker   PT Discharge Planning   PT Plan Progress LE strength and functional mobility as able.   PT Discharge Recommendation (DC Rec) home with assist;home with home care physical therapy   PT Rationale for DC Rec Pt does appear safe to return home with caregiver son and daughter, but would greatly benefit from homecare PT to help improve her mobility and decrease burden of care as able.   PT Brief overview of current status Sup->sit = min/modA; sit->stand = Ke[place]; transfers = [place] with FWW.   PT Equipment Needed at Discharge   (Pt has all equipment in place at home.)   Psychosocial Support   Trust Relationship/Rapport care explained;choices provided;emotional support provided;empathic listening provided;questions answered;questions encouraged;reassurance provided;thoughts/feelings acknowledged

## 2024-01-27 NOTE — PLAN OF CARE
"/79 (BP Location: Right arm, Cuff Size: Adult Regular)   Pulse 90   Temp 98.7  F (37.1  C) (Temporal)   Resp 16   Ht 1.6 m (5' 3\")   Wt 59.3 kg (130 lb 11.7 oz)   SpO2 94%   BMI 23.16 kg/m          Arouses to voice, very [place]- pocket talker in room. Confused at baseline but very lethargic. Afib on telemetry- increasing 150s w activity, PO metoprolol started. Bruising/scabbing to L eye. Incontinent of urine, no BM since 1/23- PRN senna given. NS @ 75 ml/hr, IV Rocephin cont. Son & daughter at bedside most of shift- reassured by touch & their presence. Alarms activated, bed rails in place and room across from nurses station      Face to face report given with opportunity to observe patient.    Report given to GIANNA Webster RN   1/27/2024  6:52 PM            "

## 2024-01-27 NOTE — H&P
[facility]    History and Physical - Hospitalist Service       Date of Admission:  1/26/2024    Assessment & Plan      Bianca Greene is a 89 year old female admitted on 1/26/2024 with UTI    # Complicated UTI  # Acute on chronic metabolic encephalopathy  -89 year old female with established diagnosis of dementia was brought to the hospital with some confusion, hallucination and weakness.    -3 days prior to present admission patient was  diagnosed with UTI and she was discharged on Keflex.  Her urine culture came back positive for E. coli sensitive to cephalosporin.    -On present admission labs are grossly unremarkable.    Patient assessed as having complicated UTI, acute on chronic encephalopathy due to UTI,  -Patient started on IV antibiotic with Rocephin.  IV fluid    #rECURRENT FALLS  -Yesterday patient fell and hit her face  -She has bruises in the orbital area  -CT of the brain and orbital CT negative for any acute changes  -Physical therapy and Occupational Therapy ordered      # Dementia    # Hypertension  -resume home medications    #Afib  -reate controlled        Diet:  Regular diet  DVT Prophylaxis: Pneumatic Compression Devices  Reed Catheter: Not present  Lines: None     Cardiac Monitoring: None  Code Status:  Full code    Clinically Significant Risk Factors Present on Admission                  # Hypertension: Noted on problem list   # Dementia: noted on problem list               Disposition Plan      Expected Discharge Date: 01/27/2024                  Ernesto Whitlock MD  Hospitalist Service  [facility]  Securely message with [employer] (more info)  Text page via [employer] Paging/Directory     ______________________________________________________________________    Chief Complaint   Confusion, hallucination    History is obtained from the patient    History of Present Illness   Bianca Greene is a 89 year old female with established diagnosis of dementia, hypertension who  was brought to the hospital with some confusion, hallucination and weakness.  Yesterday patient fell and hit her face and she has a bruise in the orbital area.  3 days prior to present admission patient was seen in the emergency room and diagnosed with UTI and she was discharged on Keflex.  Her urine culture came back positive for E. coli sensitive to cephalosporin.  After patient was discharged from the hospital she was doing well until yesterday.    On present admission labs are grossly unremarkable.  CT of the head negative for any acute changes.  CT orbital.    Patient assessed as having complicated UTI, acute on chronic encephalopathy due to UTI, fall    Patient started on IV antibiotic with Rocephin.  IV fluid        Past Medical History    Past Medical History:   Diagnosis Date    Abdominal pain, left lower quadrant 5/14/2002    Chest pain, unspecified 6/26/2007    Other screening mammogram 5/22/2002    Venous stasis ulcer (H)        Past Surgical History   Past Surgical History:   Procedure Laterality Date    EYE SURGERY  2010    Bilateral cataracts    HYSTERECTOMY      left hip replacement  2009    RIGHT hip replacement per pt    TONSILLECTOMY       Family History:    Family History[]Expand by Default         Family History   Problem Relation Age of Onset    Heart Disease Father           heart disease; cause of death    Dementia Mother 89         cause of death    Osteoporosis Mother      Cancer - colorectal Sister      Cancer Son 54         brain    Cancer Sister 62         leukemia; cause of death    Cerebrovascular Disease Son      Suicide Brother              Social History:  Marital Status:   [5]  Social History           Tobacco Use    Smoking status: Never    Smokeless tobacco: Never   Substance Use Topics    Alcohol use: No    Drug use: No       Allergies:  No Known Allergies      Prior to Admission Medications   Prior to Admission Medications   Prescriptions Last Dose Informant Patient  Reported? Taking?   ammonium lactate (AMLACTIN) 12 % external cream   No No   Sig: Apply topically 2 times daily   cephALEXin (KEFLEX) 500 MG capsule   No No   Sig: Take 1 capsule (500 mg) by mouth 4 times daily for 7 days   oxyCODONE-acetaminophen 2.5-325 MG TABS   No No   Sig: Take 1 tablet by mouth 2 times daily as needed (Pain)      Facility-Administered Medications: None        Review of Systems    The 10 point Review of Systems is negative other than noted in the HPI      Physical Exam   Vital Signs: Temp: 98.9  F (37.2  C) Temp src: Tympanic BP: (!) 174/135 Pulse: 84   Resp: 19 SpO2: 96 % O2 Device: None (Room air)    Weight: 134 lbs .63 oz    Constitutional:       General: She is not in acute distress.     Appearance: She is well-developed.   HENT:      Head: Normocephalic.   Eyes:      Conjunctiva/sclera: Conjunctivae normal.      Pupils: Pupils are equal, round, and reactive to light.      Comments: Ecchymosis surrounding left orbit   Cardiovascular:      Rate and Rhythm: Normal rate and regular rhythm.      Heart sounds: Normal heart sounds.   Pulmonary:      Effort: Pulmonary effort is normal. No respiratory distress.      Breath sounds: Normal breath sounds. No wheezing or rales.   Abdominal:      General: There is no distension.      Palpations: Abdomen is soft.      Tenderness: There is no abdominal tenderness.   Musculoskeletal:         General: No deformity or signs of injury.      Cervical back: Normal range of motion and neck supple.      Right lower leg: No edema.      Left lower leg: No edema.   Skin:     General: Skin is warm and dry.   Neurological:      Mental Status: She is alert.           Medical Decision Making           78 MINUTES SPENT BY ME on the date of service doing chart review, history, exam, documentation & further activities per the note.      Data   ------------------------- PAST 24 HR DATA REVIEWED -----------------------------------------------    I have personally reviewed  the following data over the past 24 hrs:    4.9  \   10.4 (L)   / 281     140 107 16.7 /  99   4.1 21 (L) 0.94 \     Trop: 29 (H) BNP: N/A     Procal: N/A CRP: N/A Lactic Acid: 1.0         Imaging results reviewed over the past 24 hrs:   Recent Results (from the past 24 hour(s))   CT Head w/o Contrast    Narrative    Exam: CT HEAD W/O CONTRAST    Clinical history:89 years Female AMS  ??13 hours prior to  presentation, the patient fell out of her chair and smashed her face  on the ground. ?There was no reported loss of consciousness.??She is  on aspirin and I do not see any anticoagulation.       Comparisons: 4/5/2023    Technique: Axial CT imaging of the head was performed Without  intervenous contrast.  This exam was performed using one or more of the following dose  reduction techniques:  Automated exposure control, adjustment of the PACO and/or KV according  to patient's size, and/or use of iterative reconstruction technique.    FINDINGS: There is generalized cerebral and cerebellar volume loss.   Ventricles and sulci are symmetric. The gray-white matter  differentiation throughout the brain is well maintained. There is  advanced periventricular white matter change of chronic small vessel  ischemic disease. There is no evidence of intracranial mass or  hemorrhage. Visualized portions of the paranasal sinuses and mastoid  air cells are well aerated. There is no evidence of skull fracture.      Impression    IMPRESSION: No evidence of skull fracture or intracranial hemorrhage.    JOSE SWANSON MD         SYSTEM ID:  [id number]   CT Orbits wo Contrast    Narrative    CT ORBITAL W/O CONTRAST    HISTORY: 89 years Female Trauma    COMPARISON: None    TECHNIQUE: Axial CT imaging of the facial bones was performed. Coronal  sagittal reconstructions were obtained.    FINDINGS: The paranasal sinuses are clear. There is no evidence of  facial fracture. There is no evidence of orbital fracture.      Impression     IMPRESSION: Negative study.    JOSE SWANSON MD         SYSTEM ID:  [id number]

## 2024-01-27 NOTE — PROGRESS NOTES
[facility]    Medicine Progress Note - Hospitalist Service    Date of Admission:  1/26/2024    Assessment & Plan   #1 s/p fall episode with possible syncope  Patient apparently fell at home secondary to possible syncope.  Patient's physical examination shows harsh systolic murmur at the apex and patient may have significant valvular disease leading to her fall.  Will order echocardiogram and follow.  CT head and CT orbital showed no acute changes  PT/OT evaluation today.    2.  Acute cystitis with E. coli  Patient has been treated with cephalexin and currently is on ceftriaxone.    3.  Atrial fibrillation  Patient's heart rate is controlled.  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    4.  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  Diet: Regular Diet Adult    DVT Prophylaxis: Enoxaparin (Lovenox) SQ  Reed Catheter: Not present  Lines: None     Cardiac Monitoring: None  Code Status: Full Code      Clinically Significant Risk Factors Present on Admission   Low white count               # Hypertension: Noted on problem list   # Dementia: noted on problem list               Disposition Plan      Expected Discharge Date: 01/27/2024                  Altaf Toribio MD  Hospitalist Service  [facility]  Securely message with [employer] (more info)  Text page via [employer] Paging/Directory   ______________________________________________________________________    Interval History   Pt stayed stable overnight.  This morning, pt is awake and calm but non-verbal    Physical Exam   Vital Signs: Temp: 98.1  F (36.7  C) Temp src: Tympanic BP: 134/63  Pulse: 88   Resp: 18 SpO2: 96 % O2 Device: None (Room air)    Weight: 130 lbs 11.72 oz    General Appearance: In NAD  Respiratory: CTA bilaterally  Cardiovascular: irregulary irregular, harsh systolic murmur loudest at the apex  GI: soft NT ND   Skin: intact  Other: Neuro:  alert but not oriented.  Moving 4 ext     Medical Decision Making       55 MINUTES SPENT BY ME on the date of service doing chart review, history, exam, documentation & further activities per the note.      Data     I have personally reviewed the following data over the past 24 hrs:    4.5  \   10.0 (L)   / 268     138 109 (H) 13.4 /  87   3.9 19 (L) 0.88 \     Trop: 29 (H) BNP: N/A     Procal: N/A CRP: N/A Lactic Acid: 1.0         Imaging results reviewed over the past 24 hrs:   Recent Results (from the past 24 hour(s))   CT Head w/o Contrast    Narrative    Exam: CT HEAD W/O CONTRAST    Clinical history:89 years Female AMS  ??13 hours prior to  presentation, the patient fell out of her chair and smashed her face  on the ground. ?There was no reported loss of consciousness.??She is  on aspirin and I do not see any anticoagulation.       Comparisons: 4/5/2023    Technique: Axial CT imaging of the head was performed Without  intervenous contrast.  This exam was performed using one or more of the following dose  reduction techniques:  Automated exposure control, adjustment of the PACO and/or KV according  to patient's size, and/or use of iterative reconstruction technique.    FINDINGS: There is generalized cerebral and cerebellar volume loss.   Ventricles and sulci are symmetric. The gray-white matter  differentiation throughout the brain is well maintained. There is  advanced periventricular white matter change of chronic small vessel  ischemic disease. There is no evidence of intracranial mass or  hemorrhage. Visualized portions of the paranasal sinuses and mastoid  air cells are well aerated. There is no evidence of skull fracture.       Impression    IMPRESSION: No evidence of skull fracture or intracranial hemorrhage.    JOSE SWANSON MD         SYSTEM ID:  [id number]   CT Orbits wo Contrast    Narrative    CT ORBITAL W/O CONTRAST    HISTORY: 89 years Female Trauma    COMPARISON: None    TECHNIQUE: Axial CT imaging of the facial bones was performed. Coronal  sagittal reconstructions were obtained.    FINDINGS: The paranasal sinuses are clear. There is no evidence of  facial fracture. There is no evidence of orbital fracture.      Impression    IMPRESSION: Negative study.    JOSE SWANSON MD         SYSTEM ID:  [id number]

## 2024-01-27 NOTE — PLAN OF CARE
Received report from Ines KATZ. Patient arrived to floor, transferred to bed via slideboard. IV infusing NS, VS as charted and alarms placed.     Face to face report given with opportunity to observe patient.    Report given to Jeanette Monae RN   1/26/2024  7:24 PM

## 2024-01-27 NOTE — PLAN OF CARE
"Pt is alert to self. VS and assessments as charted. Pt son stated that pt is no longer taking aspirin at home states it makes her act \"funny\". Admitted start of shift. Very difficult communication with pt. Pt is [place] and confused at baseline d/t hx of dementia. TELE did show AFIB in 80's per ICU nurses charted report which was later discontinued (see previous note). Sacral dressing placed to coccyx. Incontinent of bladder this shift. T/R q2hrs. Pt is combative with cares. She was pulling away while this writer was attempting to clean blood off of forehead and below L eye and later started to strike out at staff during turning and brief changes. She is somewhat redirectable with reassurance but quickly forgets and again strikes out at staff. PIV infusing @ 75mL/hr. Bed is locked and low, room near nurses station with room door open, alarms active and audible, frequent rounding, and call light in reach.       Face to face report given with opportunity to observe patient.    Report given to GIANNA Almaguer RN   1/27/2024  7:10 AM    "

## 2024-01-27 NOTE — PLAN OF CARE
Per MD ok to leave pt off Tele d/t equipment malfunction. Pulse ox sensor in place with alarms active and audible.

## 2024-01-28 NOTE — PROVIDER NOTIFICATION
pt is becoming SOB with very little activity. Lungs initally were clear now hearing some crackles and slight wheezing. Sating 90s on RA. Attempted to get BP was 190/60 but pt is very feisty and unsure if accurate due to pt fighting against BP cuff. MD updated. Orders to discharge IV fluids placed. PRN labetalol IV PRN orders placed, will recheck BP when pt is less agitated.     0345- Pt continues to be restless in bed, increase work of breathing. Difficult time getting accurate blood pressure due to pt unable to sit still, tensing, and pulling at blood pressure cuff/stethoscope. MD in to see pt. Orders for IV lasix placed.

## 2024-01-28 NOTE — UTILIZATION REVIEW
Dr Toribio notified      Admission Status; Secondary Review Determination       Under the authority of the Utilization Management Committee, the utilization review process indicated a secondary review on the above patient. The review outcome is based on review of the medical records, discussions with staff, and applying clinical experience noted on the date of the review.     (x) Inpatient Status Appropriate - This patient's medical care is consistent with medical management for inpatient care and reasonable inpatient medical practice.     RATIONALE FOR DETERMINATION     Patient requires inpatient admission versus short stay observation or outpatient treatment for the following reasons:           The Patient is 89 y/o woman,  with PMHx significant for  dementia, hypertension who was brought to the hospital with some confusion, hallucination and weakness. Yesterday patient fell and hit her face and she has a bruise in the orbital area. 3 days prior to present admission patient was seen in the emergency room and diagnosed with UTI and she was discharged on Keflex. Her urine culture came back positive for E. coli sensitive to cephalosporin. After patient was discharged from the hospital she was doing well until yesterday.   On present admission labs are grossly unremarkable.  CT of the head negative for any acute changes.    Overnight Pt required frequent redirection, pulling at lines, removing telemetry, attempting to get out of bed without staff assistance. During the night pt became increasingly restless accompanied by SOB and increased work of breathing. Lungs initially clear but developed crackles with some wheezing.  Her blood pressure also increased to 190/100 at the time.    89-year-old woman with dementia, treated in ER 3 days ago for UTI was brought back to the hospital for confusion.  Now she has been treated with IV ceftriaxone, but acute encephalopathy on chronic dementia processes, with episodes of  restlessness, agitation, shortness of breath last night.  The patient needs close monitoring of the infection status and metabolic encephalopathy.    The expected length of stay at the time of admission was more than 2 nights because of the severity of illness, intensity of service provided, and risk for adverse outcome. Inpatient admission is appropriate.         This document was produced using voice recognition software       The information on this document is developed by the utilization review team in order for the business office to ensure compliance. This only denotes the appropriateness of proper admission status and does not reflect the quality of care rendered.   The definitions of Inpatient Status and Observation Status used in making the determination above are those provided in the CMS Coverage Manual, Chapter 1 and Chapter 6, section 70.4.   Sincerely,   MERLENE HERNANDES MD   Utilization Review  Physician Advisor  [facility]

## 2024-01-28 NOTE — PHARMACY
Pharmacy Antimicrobial Stewardship Assessment     Current Antimicrobial Therapy:  Anti-infectives (From now, onward)      Start     Dose/Rate Route Frequency Ordered Stop    24 0900  cefTRIAXone IN D5W (ROCEPHIN) intermittent infusion 2 g         2 g  100 mL/hr over 30 Minutes Intravenous EVERY 24 HOURS 24 1923              Indication: UTI    Days of Therapy: 3  (Day 6 of Antibiotics - received Rocephin x1 on , then was prescribed cephalexin outpatient)                  Pertinent Labs:  Recent Labs   Lab Test 24  0505 24  0452 24  1508   WBC 6.5 4.5 4.9     Recent Labs   Lab Test 24  0505 24  1508   LACT 1.9 1.0        Temperature:  Temp (24hrs), Av.2  F (36.8  C), Min:97.7  F (36.5  C), Max:98.7  F (37.1  C)    Culture Results:     Collected Updated Procedure Result Status    2024 1053 2024 0753 Urine Culture [[id number]]    (Abnormal)   Urine, Catheter    Preliminary result Component Value   Culture >100,000 CFU/mL Escherichia coli Abnormal  P    >100,000 CFU/mL Alpha hemolytic Streptococcus Abnormal  P    Sen to IDDL for identification.       Susceptibility     Escherichia coli     RAIZA     Amoxicillin/Clavulanate 4 Susceptible *     Ampicillin 4 Susceptible     Ampicillin/ Sulbactam 4 Susceptible     Cefazolin <=4 Susceptible     Cefepime <=1 Susceptible     Ceftazidime <=1 Susceptible     Ceftriaxone <=1 Susceptible     Ciprofloxacin >=4 Resistant     Ertapenem <=0.5 Susceptible     Extended Spectrum Beta-Lactamase Negative ESBL Negative *     Gentamicin <=1 Susceptible     Imipenem <=0.25 Susceptible     Levofloxacin >=8 Resistant     Nitrofurantoin <=16 Susceptible     Piperacillin/Tazobactam <=4 Susceptible     Tobramycin <=1 Susceptible     Trimethoprim/Sulfamethoxazole <=1/19 Susceptible              * Suppressed Antibiotic           Recommendations/Interventions:  1. None at this time.      Mell Navarrete RPH  2024

## 2024-01-28 NOTE — PLAN OF CARE
MD had ordered a one time dose of Zyprexa 5 mg IM for pt due to agitation at 0330. MD then came to see pt and instructed writer to hold off on giving this medication at this time.     Spoke again with MD at aprox 0448 and instructed to give zyprexa if needed for restlessness/agitation. Previous order timed out due to being a one time order, new order placed. Pt was significantly less restless after getting up to void and have small BM.

## 2024-01-28 NOTE — PROGRESS NOTES
Patient appears tachypneic and has some cracles on auscultation. CXR ordered. Stop fluid, 20 mg iv lasix ordered.

## 2024-01-28 NOTE — PLAN OF CARE
"Reason for admission: UTI  Pt is arouses to voice, confused at baseline.  Ax 2 with gait belt and walker. Pt is [place] pocket talker in room. Per ICU nurse Afib on tele 's  Denies pain throughout shift rFLACC   Bruising and scabbing to left eye  Up in chair multiple times today   IV SL  ABX rocephin given   At the beginning of shift, digitally removed feces, medium amount hard and formed. Gave suppository, pt had large loose BM after suppository  Family in room throughout day  PRN Zyprexa given at very end of shift for agitation   Swallowed pills whole with water  Pt remains free from injury and falls this shift     /67 (BP Location: Left arm, Patient Position: Sitting, Cuff Size: Adult Regular)   Pulse 85   Temp 98.5  F (36.9  C) (Tympanic)   Resp 20   Ht 1.6 m (5' 3\")   Wt 59.3 kg (130 lb 11.7 oz)   SpO2 97%   BMI 23.16 kg/m       Face to face report given with opportunity to observe patient.    Report given to Tiffani Richardson RN on 1/28/2024 at 7:06 PM        "

## 2024-01-28 NOTE — PROGRESS NOTES
[facility]    Medicine Progress Note - Hospitalist Service    Date of Admission:  1/26/2024    Assessment & Plan   #1 s/p fall episode with possible syncope  Patient apparently fell at home secondary to possible syncope.  Patient's physical examination shows harsh systolic murmur at the apex and patient may have significant valvular disease leading to her fall.  Will order echocardiogram and follow.  CT head and CT orbital showed no acute changes  -1/28: pt's daughter reports that when she fell, it was a mechanical fall and not a syncope.  Echo has been ordered for tomorrow.      2.  Acute cystitis with E. coli  Patient has been treated with cephalexin and currently is on ceftriaxone.  -1/28: continue with ceftriaxone    3. Acute CHF, Atrial fibrillation  Patient's heart rate is controlled.  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  -1/28: episode of CHF exacerbation last night presumably from fluid overload.  Improved with furosemide.  Will check echo tomorrow.    4.  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    5. Code status:  Spoke with daughter regarding patient's previously known wishes.  She does not want aggressive measures and does not want to be in vegetative state on life support system, consistent with DNR and DNI status.       Observation Goals: -diagnostic tests and consults completed and resulted, -vital signs normal or at patient baseline, Nurse to notify provider when observation goals have been met and patient is ready for discharge.  Diet: Regular Diet Adult    DVT Prophylaxis: Enoxaparin (Lovenox) SQ  Reed Catheter: Not present  Lines: None     Cardiac Monitoring: ACTIVE order. Indication: Syncope- high cardiac risk (48 hours)  Code Status: No CPR- Do  NOT Intubate      Clinically Significant Risk Factors Present on Admission   Low white count               # Hypertension: Noted on problem list   # Dementia: noted on problem list               Disposition Plan           Altaf Toribio MD  Hospitalist Service  [facility]  Securely message with [employer] (more info)  Text page via [employer] Paging/Directory   ______________________________________________________________________    Interval History   Patient had an episode of agitation with dyspnea associated with wheezing and crackles at bases.  O2 sat dropped and elevated blood pressure was noted.  X-ray showed pulmonary vascular congestion consistent with a CHF and she was treated with furosemide IV.  This morning, patient appears comfortable and is not in distress.  Nursing staff reports patient having constipation and did not have bowel movement despite attempts.    Physical Exam   Vital Signs: Temp: 98.2  F (36.8  C) Temp src: Tympanic BP: 170/100 Pulse: 85   Resp: 26 SpO2: 98 % O2 Device: None (Room air)    Weight: 130 lbs 11.72 oz    General Appearance: In NAD  Respiratory: CTA bilaterally  Cardiovascular: irregulary irregular, harsh systolic murmur loudest at the apex, no lower extremity edema  GI: soft NT ND   Skin: intact  Other: Neuro:  alert but not oriented.  Moving 4 ext     Medical Decision Making       55 MINUTES SPENT BY ME on the date of service doing chart review, history, exam, documentation & further activities per the note.      Data     I have personally reviewed the following data over the past 24 hrs:    6.5  \   11.1 (L)   / 303     136 104 15.2 /  85   4.4 17 (L) 1.00 (H) \     ALT: N/A AST: N/A AP: N/A TBILI: N/A   ALB: 3.9 TOT PROTEIN: N/A LIPASE: N/A     Procal: N/A CRP: N/A Lactic Acid: 1.9       Ferritin:  71 % Retic:  N/A LDH:  N/A       Urine Culture  Specimen Information: Urine, Catheter   2 Result Notes  Culture >100,000 CFU/mL Escherichia coli Abnormal    >100,000 CFU/mL Alpha  hemolytic Streptococcus Abnormal         Susceptibility   RAIZA     Ampicillin 4 Susceptible     Ampicillin/ Sulbactam 4 Susceptible     Cefazolin <=4 Susceptible     Cefepime <=1 Susceptible     Ceftazidime <=1 Susceptible     Ceftriaxone <=1 Susceptible     Ciprofloxacin >=4 Resistant     Ertapenem <=0.5 Susceptible     Gentamicin <=1 Susceptible     Imipenem <=0.25 Susceptible     Levofloxacin >=8 Resistant     Nitrofurantoin <=16 Susceptible     Piperacillin/Tazobactam <=4 Susceptible     Tobramycin <=1 Susceptible     Trimethoprim/Sulfamethoxazole <=1/19 Susceptible               Imaging results reviewed over the past 24 hrs:   No results found for this or any previous visit (from the past 24 hour(s)).

## 2024-01-28 NOTE — PLAN OF CARE
Goal Outcome Evaluation:       Pt very [place], pocket talker at bedside. Pt requiring frequent redirection, pulling at lines, removing telemetry, attempting to get out of bed without staff assistance. During the night pt became increasingly restless accompanied by SOB and increased work of breathing. Lungs initially clear but developed crackles with some wheezing. MD notified (see other note by this writer.) Accurate BPs difficult to get due to pt agitation, tensing and pulling at BP cuff and Stethoscope. Bowels audible, 1 small BM this shift. Incontinent of urine. Turning pt every 2 hours, room near nurses station.     Face to face report given with opportunity to observe patient.    Report given to Pamela Barrios RN   1/28/2024  7:41 AM

## 2024-01-29 NOTE — PROGRESS NOTES
01/29/24 0900   Appointment Info   Signing Clinician's Name / Credentials (PT) Juan Gonsalves DPT   Interventions   Interventions Quick Adds Therapeutic Activity   Therapeutic Activity   Therapeutic Activities: dynamic activities to improve functional performance Minutes (10285) 16   Symptoms Noted During/After Treatment   (See below)   Treatment Detail/Skilled Intervention Upon approach, pt was asleep in bed with RN and CNA attempting to wake pt for transfer into recliner so bed linens could be changed.  Pt would rouse momentarily then fall back to sleep.  Pt's daughter Selene was present throughout and stated okay to start assisting pt up to sitting.  With maxA x2 PT and RN helping pt sup->sit.  Pt did start to wake up, but now fairly agitated with encouragement/reassurance from staff and dtr helping a little.  Pt needing mod to maxA for sitting balance as pt was retropulsive.  Pt became more agitated if PT attempted to help pt stand (balling up her right fist), but with dtr Selene initiating to help pt up from bed (and PT/rehab aide assisting) were able to get pt to standing on second attempt.  With mod/maxA x 2 able to keept pt's hands on FWW and help her complete pivot transfer to recliner with FWW.  Pt not comprehending instruction to scoot back (like she did at PT eval on Saturday) and again looking more agitated if PT began to assist her so instead used transfer sheet on recliner to help reposition pt back with Ax2.  Helped pt get comfortable with feet up and reclined back and she quickly began to fall asleep again.  Pt's dtr Selene was very grateful and stated this agitation and severe lethargy is unusual for her mom.  Ended visit with pt in recliner, call button in easy reach, clip alert on and RN and dtr Selene still in room.   PT Discharge Planning   PT Plan Progress LE strength and functional mobility as able.   PT Discharge Recommendation (DC Rec) home with assist;home with home care physical therapy   PT  Rationale for DC Rec Pt much worse with transfers today, but this seemed directly related to significant lethargy and agitation (r/t to UTI, meds?) and anticipate when that clears she will be able to transfer at/near her baseline like she did Saturday at PT eval.   PT Brief overview of current status Mod/maxA x 2 for transfers today.   PT Equipment Needed at Discharge   (Pt has equipment in place.)   Total Session Time   Timed Code Treatment Minutes 16   Total Session Time (sum of timed and untimed services) 16

## 2024-01-29 NOTE — PROGRESS NOTES
Assessment completed by visit with daughter, Selene.    LOC: asleep, would moan out off an on during conversation    Dx: UTI  Chronic Disease Management: HTN, dementia    Lives with: son, Jamel   Living at:  Jamel's home  Transportation: YES Agency     Primary PCP: Mell Burrell  Insurance:  Medicare/Medicaid       Support System:  family  Homecare/PCA: not currently connected.  Selene indicates they would like assistance with showers.  Also, discussed PT recommendation for home care services.  Selene agreeable  /County Services:   on [place], will inquire with [facility] about additional support   :       How was the [facility] notification completed:       Health Care Directive: yes- [place] and Jamel  Guardian: no  POA: [place]    Pharmacy: [facility]   Meds management: family manages     Adequate Resources for needs (housing, utilities, food/med): YES  Household chores: family manages   Work/community/social activity: NO limited by hip hardware     ADLs: receives assistance  Ambulation:pivots only   Falls: yes   Nutrition: no concerns   Sleep: no concerns     Equipment used: wheelchair, shower chair, grab bars       Oxygen supplier: no       Does the supplier have valid oxygen orders: n/a    Mental health: dementia   Substance abuse: no  Exposure to violence/abuse: no  Stressors: no concerns     Able to Return to Prior Living Arrangements: YES    Choice of Vendor: Pt/family was provided with the Medicare Compare list for Home Health Care.  Discussed associated Medicare star ratings to assist with choice for referrals/discharge planning.    Education was given to pt/family that star ratings are updated/maintained by Medicare and can be reviewed by visiting www.medicare.gov.    Patient/family choices for vendor in priority are:   First Choice : [facility]; no availability     Second Choice: [facility] ; referral sent and accepted for services at discharge     Third Choice: [facility]      Barriers: home care availability     MIRTHA: medium    Plan: return to Jamel's home via agency

## 2024-01-29 NOTE — PROGRESS NOTES
[facility]    Medicine Progress Note - Hospitalist Service    Date of Admission:  1/26/2024    Assessment & Plan   #1 s/p fall episode with possible syncope  Patient apparently fell at home secondary to possible syncope.  Patient's physical examination shows harsh systolic murmur at the apex and patient may have significant valvular disease leading to her fall.  Will order echocardiogram and follow.  CT head and CT orbital showed no acute changes  -1/28: pt's daughter reports that when she fell, it was a mechanical fall and not a syncope.  Echo has been ordered for tomorrow.  -1/29: Patient has remained stable.  Check echocardiogram today.  PT/OT today.    2.  Acute cystitis with E. coli  Patient has been treated with cephalexin and currently is on ceftriaxone.  -1/28: continue with ceftriaxone  -1/29: Continue with the ceftriaxone IV patient appears to be clinically improving    3. Acute CHF, Atrial fibrillation  Patient's heart rate is controlled.  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  -1/28: episode of CHF exacerbation last night presumably from fluid overload.  Improved with furosemide.  Will check echo tomorrow.  -1/29: Patient remains hemodynamically stable with stable respiratory status on room air.  Check echo today.    4.  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  -1/29: Patient is having mild delirium at night with the hallucinations but no agitation episodes    5. Code status:  Spoke with daughter regarding patient's previously known wishes.  She does not want aggressive measures and does not want to be in vegetative state on life support system, consistent with DNR and DNI status.          Diet: Regular Diet Adult    DVT Prophylaxis: Enoxaparin (Lovenox)  SQ  Reed Catheter: Not present  Lines: None     Cardiac Monitoring: ACTIVE order. Indication: Syncope- high cardiac risk (48 hours)  Code Status: No CPR- Do NOT Intubate      Clinically Significant Risk Factors Present on Admission   Low white count               # Hypertension: Noted on problem list   # Dementia: noted on problem list               Disposition Plan           Altaf Toribio MD  Hospitalist Service  [facility]  Securely message with [employer] (more info)  Text page via [employer] Paging/Directory   ______________________________________________________________________    Interval History   Patient remained stable overnight without episodes of dyspnea.  Patient did have seem to have hallucinations but no agitation overnight.  Patient had 2 bouts of mild bowel movements yesterday after laxatives.    Physical Exam   Vital Signs: Temp: 97.8  F (36.6  C) Temp src: Tympanic BP: 154/60 Pulse: 86   Resp: 20 SpO2: 98 % O2 Device: None (Room air)    Weight: 130 lbs 11.72 oz    General Appearance: In NAD  Respiratory: CTA bilaterally  Cardiovascular: irregulary irregular, harsh systolic murmur loudest at the apex, no lower extremity edema  GI: soft NT ND   Skin: intact  Other: Neuro:  alert but not oriented.  Moving 4 ext     Medical Decision Making       55 MINUTES SPENT BY ME on the date of service doing chart review, history, exam, documentation & further activities per the note.      Data     I have personally reviewed the following data over the past 24 hrs:    6.4  \   10.4 (L)   / 231     N/A N/A N/A /  N/A   N/A N/A N/A \     ALT: N/A AST: N/A AP: N/A TBILI: N/A   ALB: N/A TOT PROTEIN: N/A LIPASE: N/A     Ferritin:  N/A % Retic:  N/A LDH:  N/A       Urine Culture  Specimen Information: Urine, Catheter   2 Result Notes  Culture >100,000 CFU/mL Escherichia coli Abnormal    >100,000 CFU/mL Alpha hemolytic Streptococcus Abnormal         Susceptibility   RAIZA     Ampicillin 4 Susceptible     Ampicillin/  Sulbactam 4 Susceptible     Cefazolin <=4 Susceptible     Cefepime <=1 Susceptible     Ceftazidime <=1 Susceptible     Ceftriaxone <=1 Susceptible     Ciprofloxacin >=4 Resistant     Ertapenem <=0.5 Susceptible     Gentamicin <=1 Susceptible     Imipenem <=0.25 Susceptible     Levofloxacin >=8 Resistant     Nitrofurantoin <=16 Susceptible     Piperacillin/Tazobactam <=4 Susceptible     Tobramycin <=1 Susceptible     Trimethoprim/Sulfamethoxazole <=1/19 Susceptible               Imaging results reviewed over the past 24 hrs:   No results found for this or any previous visit (from the past 24 hour(s)).

## 2024-01-29 NOTE — PLAN OF CARE
Pt was awake most of the night fidgeting in bed and talking to self, fell asleep at approx 0330. Oriented to self. Calm and cooperative, difficulty following commands d/t [place]. Denies pain. LS are diminished, clear - difficulty having patient take deep breaths d/t hearing impairment. No cough noted. Incontinent of B&B. Patient shifts weight in bed, assisted with turns. IV SL. Remains on RA with SpO2 >92%    Bed alarm active and audible. Continuous observation maintained overnight.     Face to face report given with opportunity to observe patient.    Report given to GIANNA Wynne RN   1/29/2024  7:25 AM

## 2024-01-29 NOTE — PHARMACY-MEDICATION REGIMEN REVIEW
Pharmacy Antimicrobial Stewardship Assessment     Current Antimicrobial Therapy:  Anti-infectives (From now, onward)      Start     Dose/Rate Route Frequency Ordered Stop    24 0900  cefTRIAXone IN D5W (ROCEPHIN) intermittent infusion 2 g         2 g  100 mL/hr over 30 Minutes Intravenous EVERY 24 HOURS 24 1923              Indication: UTI    Days of Therapy: 7 of total abx therapy (received Rocephin x 1 on , then was prescribed cephalexin outpatient)      Pertinent Labs:  Recent Labs   Lab Test 24  0701 24  0505 24  0452   WBC 6.4 6.5 4.5     Recent Labs   Lab Test 24  0505 24  1508   LACT 1.9 1.0        Temperature:  Temp (24hrs), Av.2  F (36.8  C), Min:97.8  F (36.6  C), Max:98.5  F (36.9  C)    Culture Results:   7-Day Micro Results    Collected Updated Procedure Result Status    2024 1053 2024 1109 Urine Culture [[id number]]    (Abnormal)   Urine, Catheter    Final result Component Value   Culture >100,000 CFU/mL Escherichia coli Abnormal     >100,000 CFU/mL Aerococcus sanguinicola Abnormal     Identification obtained by MALDI-TOF mass spectrometry research use only database. Test characteristics determined and verified by the Infectious Diseases Diagnostic Laboratory.    Aerococcus species is usually susceptible to penicillin, cephalosporins, tetracycline and vancomycin.       Susceptibility     Escherichia coli     RAIZA     Amoxicillin/Clavulanate 4 Susceptible *     Ampicillin 4 Susceptible     Ampicillin/ Sulbactam 4 Susceptible     Cefazolin <=4 Susceptible     Cefepime <=1 Susceptible     Ceftazidime <=1 Susceptible     Ceftriaxone <=1 Susceptible     Ciprofloxacin >=4 Resistant     Ertapenem <=0.5 Susceptible     Extended Spectrum Beta-Lactamase Negative ESBL Negative *     Gentamicin <=1 Susceptible     Imipenem <=0.25 Susceptible     Levofloxacin >=8 Resistant     Nitrofurantoin <=16 Susceptible     Piperacillin/Tazobactam <=4 Susceptible      Tobramycin <=1 Susceptible     Trimethoprim/Sulfamethoxazole <=1/19 Susceptible              * Suppressed Antibiotic             Recommendations/Interventions:  Total duration of therapy ranges from 5-14 days and depends on clinical response. No recommendations at this time. Will continue to monitor.     Ana Paula Raymundo RPH  January 29, 2024

## 2024-01-29 NOTE — PLAN OF CARE
Father Bill from [facility] was in to see patient. Daughter and Son also present.

## 2024-01-29 NOTE — PROGRESS NOTES
"   01/29/24 1400   Appointment Info   Signing Clinician's Name / Credentials (SLP) Giovanny mari MS CF-SLP   Appointment Canceled Reason (SLP) Unarousable   Appointment Cancel Comments (SLP) SLP attempted clinical swallow assessment this PM. Nursing staff reported that the pt has not been able to be aroused today. Pt attempted to open eyes for a moment. However, pt fell back asleep immediately. Family reported that she \"has been sleeping all day\". SLP determined evaluation was inapproriate to attempt this PM due to inability to be aroused. SLP to attempt session again when able.       "

## 2024-01-29 NOTE — SIGNIFICANT EVENT
Significant Event Note    Time of event: 2:47 PM January 29, 2024    Description of event:  Patient remains lethargic throughout the day today.  As a result, patient has not been able to take any p.o. medications or diet.  On exam she opens her eyes briefly but not following commands.    Plan:  Will check a CT of the head as well as ammonia and electrolytes as well as a lactic acid and CBC.    Discussed with: patient's family/emergency contact and bedside nurse    Altaf Toribio MD

## 2024-01-29 NOTE — MEDICATION SCRIBE - ADMISSION MEDICATION HISTORY
Medication Scribe Admission Medication History    Admission medication history is complete. The information provided in this note is only as accurate as the sources available at the time of the update.    Information Source(s): Family member and CareEverywhere/SureScripts via in-person    Pertinent Information:   Patient's daughter (Selene) manages medications and is a good historian.     Changes made to PTA medication list:  Added: baby asa (OTC pain management)  Deleted: None  Changed: amlactin from scheduled to PRN      Allergies reviewed with patient and updates made in EHR: yes    Medication History Completed By: Katalina Shah 1/29/2024 9:55 AM    PTA Med List   Medication Sig Note Last Dose    ammonium lactate (AMLACTIN) 12 % external cream Apply topically 2 times daily as needed for dry skin  Past Month    aspirin 81 MG EC tablet Take 81 mg by mouth daily as needed for pain  Past Week    cephALEXin (KEFLEX) 500 MG capsule Take 1 capsule (500 mg) by mouth 4 times daily for 7 days 1/29/2024: Took 6 doses total Past Week    oxyCODONE-acetaminophen (PERCOCET) 5-325 MG tablet Take 0.5 tablets by mouth 2 times daily as needed for pain  Past Month

## 2024-01-29 NOTE — PROVIDER NOTIFICATION
Notified Dr. Toribio: Patient still very lethargic, haven't been able to give meds yet. Loss of IV access, calling someone in to try with ultrasound at this time as unable to establish a new one.

## 2024-01-29 NOTE — PROVIDER NOTIFICATION
Patient remains lethargic, only opening eyes briefly, unable to safely swallow or follow directions. Dr. Toribio was updated at approximately 1345 that oral potassium was ordered but patient still not alert enough to safely give any oral medications at this time.     Updated Dr. Toribio at 1413 that patient was hoyered back to bed, only opens eyes very briefly, unable to keep her eyes open or follow directions. Family at bedside     1419 Dr. Toribio in to see patient and speak with family at this time.

## 2024-01-29 NOTE — PROGRESS NOTES
US guided PIV placed per request from nursing staff on unit. 20G PIV placed to left upper Forearm. Family at bedside, thankful for provision. Pt tolerated well.

## 2024-01-30 PROBLEM — D50.9 ANEMIA, IRON DEFICIENCY: Status: ACTIVE | Noted: 2024-01-01

## 2024-01-30 NOTE — PLAN OF CARE
"Patient disoriented, somnolent, lethargic. Only opens eyes briefly to repeated stimuli, unable to follow directions, doesn't keep eyes open. Not alert enough to safely give oral medications, MD made aware during care conferences this morning and updated several times throughout the shift (see provider notification notes). Patient was up in chair this morning after waking some to stimuli, heavy Ax3, PT was present (see note). Patient immediately closed eyes again once up in chair and unable to rouse enough to give food, meds, fluids. Belen used to place patient back to bed this afternoon. Had an Echo this morning, IV infiltrated, new IV placed using ultrasound. At one point this afternoon when turning/changing patient she did say \"What are you doing?\" This is the most she said all day. Incontinent of urine. Turn and repositioned, weight shifting. Foam sacral dressing remains in place. Alarms on, frequent rounding, room by nurse's station. Patient remained somnolent at end of shift.     Face to face report given with opportunity to observe patient.    Report given to Enriqueta Cerda RN   1/29/2024  7:05 PM      "

## 2024-01-30 NOTE — PROGRESS NOTES
[facility]    Medicine Progress Note - Hospitalist Service    Date of Admission:  1/26/2024    Assessment & Plan   # altered mental status, hospital acquired RLL pneumonia  -1/30: Patient was lethargic all day yesterday to the point where it was unsafe for patient to take any p.o.  Workup revealed hyperventilation with a increased AA gradient and chest x-ray showed right lower lobe infiltrate consistent with pneumonia.  Patient was started on meropenem and vancomycin last night.    # s/p fall episode with possible syncope  Patient apparently fell at home secondary to possible syncope.  Patient's physical examination shows harsh systolic murmur at the apex and patient may have significant valvular disease leading to her fall.  Will order echocardiogram and follow.  CT head and CT orbital showed no acute changes  -1/28: pt's daughter reports that when she fell, it was a mechanical fall and not a syncope.  Echo has been ordered for tomorrow.  -1/29: Patient has remained stable.  Check echocardiogram today.  PT/OT today.  -1/30: Echocardiogram showed moderate aortic stenosis which is a significant change for worse from previous echocardiogram.  This may be contributing to her fall episodes.    #   Acute cystitis with E. coli  Patient has been treated with cephalexin and currently is on ceftriaxone.  -1/28: continue with ceftriaxone  -1/29: Continue with the ceftriaxone IV patient appears to be clinically improving  -1/30: Continuing with antibiotic therapy with the meropenem    #  Acute CHF, Atrial fibrillation  Patient's heart rate is controlled.  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  -1/28: episode of CHF exacerbation last night presumably from fluid overload.  Improved with furosemide.  Will check echo tomorrow.  -1/29: Patient remains hemodynamically stable with stable respiratory status on room air.  Check echo  today.  -1/30: Echocardiogram showed normal left ventricular function and EF of 60 to 65%.  Right ventricle was also normal size.  Patient did show moderate left atrial enlargement.    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  -1/29: Patient is having mild delirium at night with the hallucinations but no agitation episodes    #  Code status:  Spoke with daughter regarding patient's previously known wishes.  She does not want aggressive measures and does not want to be in vegetative state on life support system, consistent with DNR and DNI status.          Diet: Regular Diet Adult    DVT Prophylaxis: Enoxaparin (Lovenox) SQ  Reed Catheter: Not present  Lines: None     Cardiac Monitoring: ACTIVE order. Indication: Acute decompensated heart failure (48 hours)  Code Status: No CPR- Do NOT Intubate      Clinically Significant Risk Factors   Low white count     # Hypokalemia: Lowest K = 3.3 mmol/L in last 2 days, will replace as needed       # Hypoalbuminemia: Lowest albumin = 3 g/dL at 1/30/2024  5:12 AM, will monitor as appropriate     # Hypertension: Noted on problem list     # Dementia: noted on problem list               Disposition Plan           Altaf Toribio MD  Hospitalist Service  [facility]  Securely message with [employer] (more info)  Text page via [employer] Paging/Directory   ______________________________________________________________________    Interval History   Patient was lethargic all day yesterday to the point where it was unsafe for patient to take any p.o.  Workup revealed hyperventilation with a increased AA gradient and chest x-ray showed right lower lobe infiltrate consistent with pneumonia.  Patient was started on meropenem and vancomycin last night.  Overnight, patient remained lethargic but arousable.  She did exhibit dyspnea and  was given low-dose morphine for symptomatic treatment.  This morning, patient is sleeping and appears comfortable.    Physical Exam   Vital Signs: Temp: 96.9  F (36.1  C) Temp src: Temporal BP: 139/90 Pulse: 71   Resp: 18 SpO2: 97 % O2 Device: None (Room air)    Weight: 129 lbs 13.62 oz    General Appearance: In NAD  Respiratory: CTA bilaterally  Cardiovascular: irregulary irregular, harsh systolic murmur loudest at the apex, no lower extremity edema  GI: soft NT ND   Skin: intact  Other: Neuro:  alert but not oriented.  Moving 4 ext     Medical Decision Making       55 MINUTES SPENT BY ME on the date of service doing chart review, history, exam, documentation & further activities per the note.      Data     I have personally reviewed the following data over the past 24 hrs:    4.2  \   9.3 (L)   / 244     137 107 11.8 /  84   3.3 (L) 21 (L) 0.93 \     ALT: 9 AST: 20 AP: 108 TBILI: 0.3   ALB: 3.0 (L) TOT PROTEIN: 6.0 (L) LIPASE: N/A     Trop: N/A BNP: 3,487 (H)     Procal: 0.05 CRP: 22.55 (H) Lactic Acid: 0.7         Urine Culture  Specimen Information: Urine, Catheter   2 Result Notes  Culture >100,000 CFU/mL Escherichia coli Abnormal    >100,000 CFU/mL Alpha hemolytic Streptococcus Abnormal         Susceptibility   RAIZA     Ampicillin 4 Susceptible     Ampicillin/ Sulbactam 4 Susceptible     Cefazolin <=4 Susceptible     Cefepime <=1 Susceptible     Ceftazidime <=1 Susceptible     Ceftriaxone <=1 Susceptible     Ciprofloxacin >=4 Resistant     Ertapenem <=0.5 Susceptible     Gentamicin <=1 Susceptible     Imipenem <=0.25 Susceptible     Levofloxacin >=8 Resistant     Nitrofurantoin <=16 Susceptible     Piperacillin/Tazobactam <=4 Susceptible     Tobramycin <=1 Susceptible     Trimethoprim/Sulfamethoxazole <=1/19 Susceptible               Imaging results reviewed over the past 24 hrs:   Recent Results (from the past 24 hour(s))   Echocardiogram Complete   Result Value    LVEF  60-65%    Narrative     [id number]  [id number]  [id number]  [id number]^LEANNA^JULIO CÉSAR^GERMAN     [facility]  Echocardiography Laboratory  [address] [place] [id number]     Name: CARITO CURIEL  MRN: [id number]  : 1930  Study Date: 2024 10:42 AM  Age: 89 yrs  Gender: Female  Patient Location: [facility]  Reason For Study: Syncope and Collapse, Cardiac Murmur  History: Syncope and Collapse, Murmur  Ordering Physician: JULIO CÉSAR RAM  Performed By: Beulah Coyle AMAIRANI     BSA: 1.6 m2  Height: 63 in  Weight: 130 lb  ______________________________________________________________________________  Procedure  Complete Portable Echo Adult. Technically difficult study. The patient's  rhythm is atrial fibrillation.  ______________________________________________________________________________  Interpretation Summary  Global and regional left ventricular function is normal with an EF of 60-65%.  The right ventricle is normal size.  Moderate left atrial enlargement is present.  Moderate aortic stenosis is present.  The peak aortic velocity is 3.61 m/sec.  The mean gradient across the aortic valve is 27.8 mmHg.  This study was compared with the study from 10/30/2019 .  Aortic stenosis has progressed since last study, Peak velocity was 2.61 M/S  and mean pressure gradient 9.0  Mild to moderate tricuspid insufficiency is present.  The right ventricular systolic pressure is 30mmHg above the right atrial  pressure.  ______________________________________________________________________________  Left Ventricle  Global and regional left ventricular function is normal with an EF of 60-65%.  Diastolic function not assessed due to atrial fibrillation.     Right Ventricle  The right ventricle is normal size. Global right ventricular function is  mildly reduced.     Atria  Moderate left atrial enlargement is present.     Mitral Valve  Mild mitral annular calcification is present. Trace mitral insufficiency is  present.     Aortic  Valve  Severe aortic valve calcification is present. Moderate aortic stenosis is  present. The peak aortic velocity is 3.61 m/sec. The mean gradient across the  aortic valve is 27.8 mmHg. The peak AoV pressure gradient is 52.2 mmHg. Aortic  stenosis has progressed since last study, Peak velocity was 2.61 M/S and mean  pressure gradient 9.0.     Tricuspid Valve  Mild to moderate tricuspid insufficiency is present. The right ventricular  systolic pressure is 30mmHg above the right atrial pressure.     Pulmonic Valve  Mild pulmonic insufficiency is present.     Vessels  Ascending aorta 3.25 cm. IVC diameter <2.1 cm collapsing >50% with sniff  suggests a normal RA pressure of 3 mmHg.     Pericardium  No pericardial effusion is present.     Compared to Previous Study  This study was compared with the study from 10/30/2019 .     ______________________________________________________________________________  MMode/2D Measurements & Calculations  IVSd: 1.2 cm  LVIDd: 3.8 cm  LVIDs: 2.5 cm  LVPWd: 0.99 cm     FS: 33.6 %  LV mass(C)d: 135.9 grams  LV mass(C)dI: 84.4 grams/m2  Ao root diam: 2.1 cm  asc Aorta Diam: 3.3 cm  LVOT diam: 2.0 cm  LVOT area: 3.1 cm2  Ao root diam index Ht(cm/m): 1.3  Ao root diam index BSA (cm/m2): 1.3  Asc Ao diam index BSA (cm/m2): 2.0  Asc Ao diam index Ht(cm/m): 2.0  LA Volume Indexed (AL/bp): 64.5 ml/m2     RWT: 0.52  TAPSE: 1.5 cm     Doppler Measurements & Calculations  Ao V2 max: 361.0 cm/sec  Ao max P.2 mmHg  Ao V2 mean: 244.0 cm/sec  Ao mean P.8 mmHg  Ao V2 VTI: 70.9 cm  BRAEDEN(I,D): 0.60 cm2  BRAEDEN(V,D): 0.55 cm2  LV V1 max P.7 mmHg  LV V1 max: 65.0 cm/sec  LV V1 VTI: 13.7 cm  SV(LVOT): 42.2 ml  SI(LVOT): 26.2 ml/m2  TR max mart: 272.0 cm/sec  TR max P.6 mmHg  AV Mart Ratio (DI): 0.18  BRAEDEN Index (cm2/m2): 0.37  RV S Mart: 5.4 cm/sec     ______________________________________________________________________________  Report approved by: Saul Hanson 2024 02:16 PM          CT Head w/o Contrast    Narrative    Exam: CT HEAD W/O CONTRAST      Exam reason: altered mental status with delirium and lethargy    Technique:   Axial images of the head obtained without contrast. Coronal and  sagittal reformations were generated. This CT was performed using one  or more of the following dose reduction techniques: automated exposure  control, adjustment of the mA and/or kV according to patient size,  and/or use of iterative reconstruction technique.    Comparison: 1/26/2024       Findings:      Parenchyma: No evidence of intraparenchymal hemorrhage, mass, acute  cortical infarct or prior infarct in a major vascular territory.      Moderate diffuse cerebral volume loss. There is periventricular deep  white matter hypoattenuation, nonspecific but likely due to chronic  microvascular ischemic change. No midline shift. The basilar cisterns  are patent.    Extra-axial spaces: No extra-axial fluid collection or hemorrhage.     Ventricles: Normal.  Paranasal sinuses: Clear.   Mastoid air cells: Clear.    Osseous: No acute findings.  Orbits: Normal.    Soft tissues: Unremarkable.       Impression    Impression:  No acute intracranial abnormality.      FRANK FUNEZ MD         SYSTEM ID:  [id number]   XR Chest Port 1 View    Narrative    PROCEDURE:  XR CHEST PORT 1 VIEW    HISTORY:  altered mental status; increased A-a gradient.     COMPARISON:  1/28/2024    FINDINGS:   The cardiac silhouette is normal in size. The pulmonary vasculature is  normal. There is a small area patchy increase in density in the upper  portion of the right lung suspicious for early pneumonia. This has  worsened as compared to 1/28/2024. There are multiple old left-sided  rib fractures. No pleural effusion or pneumothorax.      Impression    IMPRESSION:  Interval development of a right lung infiltrate as  compared to 1/28/2024      ELSA MCQUEEN MD         SYSTEM ID:  [id number]

## 2024-01-30 NOTE — PLAN OF CARE
"Goal Outcome Evaluation:    Most recent vitals: /62 (BP Location: Right arm, Patient Position: Sitting, Cuff Size: Adult Regular)   Pulse 99   Temp 98.3  F (36.8  C) (Tympanic)   Resp 22   Ht 1.6 m (5' 3\")   Wt 58.9 kg (129 lb 13.6 oz)   SpO2 97%   BMI 23.00 kg/m        Pt was very lethargic at start of shift, making little eye contact with touch and cares. Pt started to arouse after noon time, making more eye contact enough to for SP to eval pt. Diet was changed to pureed with honey thickened liquid and is to be sitting up right for meals. Pt was able to work with PT and was able to pivot to commode. BP was elevated and metoprolol was held due to NPO awaiting speech eval. Received order to stop IV fluids and start pt on a one time dose of IV lasix due to elevated BP and resp rate. Pt has been saturating briefs and some in commode when able to in a timely manner. Pt has remain in recliner chair rest of shift. HR has been tachy and in a-fib per ICU nurse. VS changed to every 2 hours. Sepsis alert flagged results 1.8., D-dimer elevated. CT scan completed, showed pleural effusion, no PE's. Pt did receive evening dose of metoprolol was giving in pudding, pt tolerated well. Good appetite. Rest of assessment is as charted. Son and drt visited with pt most of day. Call light within reach,  socks on,chair/bed wheels locked, alarms activated.     Face to face report given with opportunity to observe patient.    Report given to ANNIE Holcomb RN Heaven E. Wessman, RN   1/30/2024  7:19 PM    "

## 2024-01-30 NOTE — PROGRESS NOTES
Updated son, Jamel in regards to plan for [facility] to provide services for Bianca at discharge.

## 2024-01-30 NOTE — PHARMACY-MEDICATION REGIMEN REVIEW
Pharmacy Antimicrobial Stewardship Assessment     Current Antimicrobial Therapy:  Anti-infectives (From now, onward)      Start     Dose/Rate Route Frequency Ordered Stop    24 2330  meropenem (MERREM) 1 g vial to attach to  mL bag         1 g  over 30 Minutes Intravenous EVERY 12 HOURS 24 2301              Indication: aspiration pneumonia     Days of Therapy: 1 (Day 5 IV abx)        Pertinent Labs:  Recent Labs   Lab Test 24  0512 24  1536 24  0701   WBC 4.2 5.5 6.4     Recent Labs   Lab Test 24  0512 24  1536 24  1530 24  0701 24  0505   LACT  --   --  0.7  --  1.9   PCAL 0.05 0.05  --    < >  --     < > = values in this interval not displayed.        Temperature:  Temp (24hrs), Av.8  F (36.6  C), Min:96.9  F (36.1  C), Max:98.6  F (37  C)    Culture Results:   30-Day Micro Results    Collected Updated Procedure Result Status    2024 1053 2024 1109 Urine Culture [[id number]]    (Abnormal)   Urine, Catheter    Final result Component Value   Culture >100,000 CFU/mL Escherichia coli Abnormal     >100,000 CFU/mL Aerococcus sanguinicola Abnormal     Identification obtained by MALDI-TOF mass spectrometry research use only database. Test characteristics determined and verified by the Infectious Diseases Diagnostic [facility].    Aerococcus species is usually susceptible to penicillin, cephalosporins, tetracycline and vancomycin.       Susceptibility     Escherichia coli     RAIZA     Amoxicillin/Clavulanate 4 Susceptible *     Ampicillin 4 Susceptible     Ampicillin/ Sulbactam 4 Susceptible     Cefazolin <=4 Susceptible     Cefepime <=1 Susceptible     Ceftazidime <=1 Susceptible     Ceftriaxone <=1 Susceptible     Ciprofloxacin >=4 Resistant     Ertapenem <=0.5 Susceptible     Extended Spectrum Beta-Lactamase Negative ESBL Negative *     Gentamicin <=1 Susceptible     Imipenem <=0.25 Susceptible     Levofloxacin >=8 Resistant      Nitrofurantoin <=16 Susceptible     Piperacillin/Tazobactam <=4 Susceptible     Tobramycin <=1 Susceptible     Trimethoprim/Sulfamethoxazole <=1/19 Susceptible              * Suppressed Antibiotic               Recommendations/Interventions:  Overnight pharmacist dosed vancomycin 1x but per notes, no consult entered and unclear if intention was to continue beyond 1x dose. Have reached out to provider to see if vancomycin continuation is needed. Will continue if instructed to do so. No recommendations at this time. Will continue to monitor.       Ana Paula Raymundo RPH  January 30, 2024

## 2024-01-30 NOTE — PHARMACY-VANCOMYCIN DOSING SERVICE
Pharmacy Vancomycin Initial Note  Date of Service 2024  Patient's  1930  89 year old, female      No dosing consult put in, recommended therapy below if continued:    Indication: [facility]ated Pneumonia    Current estimated CrCl = Estimated Creatinine Clearance: 35.4 mL/min (based on SCr of 0.93 mg/dL).    Creatinine for last 3 days  2024:  4:52 AM Creatinine 0.88 mg/dL  2024:  5:05 AM Creatinine 1.00 mg/dL  2024:  7:01 AM Creatinine 1.00 mg/dL;  3:36 PM Creatinine 0.93 mg/dL    Recent Vancomycin Level(s) for last 3 days  No results found for requested labs within last 3 days.      Vancomycin IV Administrations (past 72 hours)        No vancomycin orders with administrations in past 72 hours.                    Nephrotoxins and other renal medications (From now, onward)      Start     Dose/Rate Route Frequency Ordered Stop    24 2330  vancomycin (VANCOCIN) 1,000 mg in 200 mL dextrose intermittent infusion         1,000 mg  200 mL/hr over 1 Hours Intravenous ONCE 24 2302              Contrast Orders - past 72 hours (72h ago, onward)      None            [employer] Prediction of Planned Initial Vancomycin Regimen  Loading dose: N/A  Regimen: 1000 mg IV every 24 hours.  Start time: 23:25 on 2024  Exposure target: AUC24 (range)400-600 mg/L.hr   AUC24,ss: 529 mg/L.hr  Probability of AUC24 > 400: 81 %  Ctrough,ss: 16.7 mg/L  Probability of Ctrough,ss > 20: 30 %  Probability of nephrotoxicity (Lodise EMELIA ): 12 %          Plan:  Approve one time order of 1000mg IV once as ordered if continued: Start vancomycin  1000 mg IV q24h.   Vancomycin monitoring method: AUC  Vancomycin therapeutic monitoring goal: 400-600 mg*h/L  Pharmacy will check vancomycin levels as appropriate in 1-3 Days.    Serum creatinine levels will be ordered daily for the first week of therapy and at least twice weekly for subsequent weeks.      Fredrick Higgins, PharmD

## 2024-01-30 NOTE — PLAN OF CARE
"/90 (BP Location: Right arm, Cuff Size: Adult Regular)   Pulse 71   Temp 96.9  F (36.1  C) (Temporal)   Resp 18   Ht 1.6 m (5' 3\")   Wt 58.9 kg (129 lb 13.6 oz)   SpO2 97%   BMI 23.00 kg/m          Pt very lethargic this shift, arouses to stimulation but unable to follow commands. > 90% RA. HR & RR elevated, grimacing/very tense w cares- MD notified. IV 1 mg morphine given w resp improvement. IV Vanco & Merrem started, NS @ 75 ml/hr. Incontinent of urine, mepilex intact on sacrum and repositioned q2h. Room across from nurses station, alarms activated and oral cares completed      Face to face report given with opportunity to observe patient.    Report given to GIANNA Mcneil RN   1/30/2024  6:53 AM            "

## 2024-01-30 NOTE — PROGRESS NOTES
01/30/24 0800   Appointment Info   Signing Clinician's Name / Credentials (SLP) Lili Aguilera MS CCC-SLP   Appointment Canceled Reason (SLP) Unarousable   Appointment Cancel Comments (SLP) Attempted to see pt this AM for clinical swallow evaluation. Pt was sleeping and her daughter was present at bedside. Pt's daughter reported that prior to pt being admitted to the hospital, they typically provided softer foods, however pt was able to chew and tolerate all textures. Attempted to wake pt for evaluation, however she only opened her eyes momentarily before closing them again. Plan to attempt evaluation again later today if pt is more arousable.

## 2024-01-30 NOTE — PROGRESS NOTES
01/30/24 1700   Appointment Info   Signing Clinician's Name / Credentials (SLP) Lili Aguilera MS CCC-SLP   General Information   Onset of Illness/Injury or Date of Surgery 01/26/24   Referring Physician Altaf Toribio MD   Patient/Family Therapy Goal Statement (SLP) None stated.   Pertinent History of Current Problem Pt is a 89 year old female who was admitted on 1/26/24. Orders received on 1/29/24 for clinical swallow evaluation due to concerns for aspiration. Per pt's daughter and son, pt has been tolerating a regular diet at home however they do tend to provide her with softer foods. Then denied any coughing during mealtimes.   Type of Evaluation   Type of Evaluation Swallow Evaluation   Oral Motor   Oral Musculature unable to assess due to poor participation/comprehension   Facial Symmetry (Oral Motor)   Facial Symmetry (Oral Motor) unable/difficult to assess   Lip Function (Oral Motor)   Lip Range of Motion (Oral Motor) unable/difficult to assess   Lip Sensitivity (Oral Motor) unable/difficult to assess   Lip Strength (Oral Motor) unable/difficult to assess   Tongue Function (Oral Motor)   Tongue Sensitivity (Oral Motor) unable/difficult to assess   Tongue Strength (Oral Motor) unable/difficult to assess   Tongue Coordination/Speed (Oral Motor) unable/difficult to assess   Tongue ROM (Oral Motor) unable/difficult to assess   Jaw Function (Oral Motor)   Jaw Function (Oral Motor) unable/difficult to assess   Cough/Swallow/Gag Reflex (Oral Motor)   Soft Palate/Velum (Oral Motor) unable/difficult to assess   Gag Reflex (Oral Motor) not tested   Volitional Throat Clear/Cough (Oral Motor) unable/difficult to assess   Volitional Swallow (Oral Motor) unable/difficult to assess   Vocal Quality/Secretion Management (Oral Motor)   Vocal Quality (Oral Motor) unable/difficult to assess  (Pt minimally verbal throughout evaluation and verbal production that was produced was breathy with reduced volume.)   Secretion  Management (Oral Motor) WNL   General Swallowing Observations   Past History of Dysphagia None reported.   Respiratory Support   (None)   Current Diet/Method of Nutritional Intake (General Swallowing Observations, NIS) regular diet;thin liquids (level 0)   Swallowing Evaluation Clinical swallow evaluation   Clinical Swallow Evaluation   Feeding Assistance dependent   Adaptive Eating Utensils N/A   Additional evaluation(s) completed today Yes;Recommended   Rationale for completing additional evaluation A clinical swallow evaluation cannot rule out silent aspiration of fully assess pharyngeal phase of the swallow. If provider suspects silent aspiration or if pt continues to demonstrate wet/gurgle vocal quality during advanced textures, a video swallow study may be warranted.   Clinical Swallow Evaluation Textures Trialed mildly thick liquids;moderately thick liquids/liquidized;pureed   Clinical Swallow Eval: Mildly Thick Liquids   Mode of Presentation spoon;cup;fed by clinician   Volume Presented 5x via spoon 3x via cup   Oral Phase WFL   Pharyngeal Phase intact;throat clearing  (wet breath quality after the swallow)   Diagnostic Statement Pt tolerating +5/5 mildly/nectar thickened liquids via spoon without overt signs/symptoms of aspiration. However when cup sips were trialed pt demonstrated throat clearing and a wet/gurgly vocal quality following +2/3 trials.   Clinical Swallow Eval: Moderately Thick Liquids   Mode of Presentation spoon;fed by clinician   Volume Presented 15x   Oral Phase WFL   Pharyngeal Phase intact   Diagnostic Statement Pt tolerating 15x trials of moderately/honey thickened liquids via spoon without overt signs/symptoms of aspiration. No residue after the swallow was observed in oral cavity.   Clinical Swallow Evaluation: Puree Solid Texture Trial   Mode of Presentation, Puree spoon;fed by clinician   Volume of Puree Presented 15x   Oral Phase, Puree WFL   Pharyngeal Phase, Puree intact    Diagnostic Statement Pt tolerating 15x trials of pureed textures via spoon without overt signs/symptoms of aspiration. No residue after the swallow was observed in oral cavity.   Swallowing Recommendations   Diet Consistency Recommendations moderately thick liquids/liquidized (level 3);pureed (level 4)   Supervision Level for Intake 1:1 supervision needed   Mode of Delivery Recommendations bolus size, small;liquids via spoon only;slow rate of intake   Postural Recommendations   (sitting upright at 90 degrees)   Monitoring/Assistance Required (Eating/Swallowing) check mouth frequently for oral residue/pocketing;stop eating activities when fatigue is present;monitor for cough or change in vocal quality with intake   Recommended Feeding/Eating Techniques (Swallow Eval) maintain upright sitting position for eating;maintain upright posture during/after eating for 30 minutes;provide oral hygiene prior to intake;provide assist with feeding   Medication Administration Recommendations, Swallowing (SLP) Pt may benefit from medications being crushed in pureed textures.   Instrumental Assessment Recommendations reassess via non-instrumental clinical swallow evaluation   Comment, Swallowing Recommendations Recommend IDDSI 4 (pureed) and IDDSI 3 (moderately/honey) thickened liquids. Pt will require a 1:1 feeder and all PO intake should be provided via spoon only. Recommend strict aspiration precautions be followed and PO intake should only be completed when pt is able to remain alert.   General Therapy Interventions   Planned Therapy Interventions Dysphagia Treatment   Dysphagia treatment Modified diet education;Instruction of safe swallow strategies;Compensatory strategies for swallowing   Clinical Impression   Criteria for Skilled Therapeutic Interventions Met (SLP Eval) Yes, treatment indicated   SLP Diagnosis Dysphagia   Problem List (SLP) Swallowing   Activity Limitations Related to Problem List (SLP) Pt is unable to  tolerate a regular diet and thin liquids which her family reports is her baseline diet.   Risks & Benefits of therapy have been explained care plan/treatment goals reviewed;evaluation/treatment results reviewed;risks/benefits reviewed;current/potential barriers reviewed;participants included;patient;son   Clinical Impression Comments Pt seen this afternoon for clinical swallow evaluation. Pt observed to be more alert when compared to previous evaluation attempts and was able to maintain alertness for PO intake. Pt tolerating IDDSI 4 (pureed) and IDDSI 3 (moderately/honey thickened liquids) via spoon only with no overt signs/symptoms of aspiration. During IDDSI 2 (mildly/nectar thickened liquid) trials, pt had throat clearing and a wet/gurgly vocal quality after the swallow. Due to pt being minimally verbal, vocal quality was unable to be assessed during PO intake however during IDDSI 4/3 trials, no coughing, throat clearing, or wet breath quality was observed. Recommend IDDSI 4 (pureed) and IDDSI 3 (moderately/honey) thickened liquids. Pt will require a 1:1 feeder and all PO intake should be provided via spoon only. Recommend strict aspiration precautions be followed and PO intake should only be completed when pt is able to remain alert. Provided pt's son with education regarding recommendations and POC, he expressed understanding and denied having any additional questions at this time.   SLP Total Evaluation Time   Eval: oral/pharyngeal swallow function, clinical swallow Minutes ([id number]) 25   SLP Goals   Therapy Frequency (SLP Eval) 5 times/week   SLP Predicted Duration/Target Date for Goal Attainment 02/09/24   SLP Goals Swallow   SLP: Safely tolerate diet without signs/symptoms of aspiration Minced & moist diet;Thin liquids;With assistance/supervision   SLP Discharge Planning   SLP Plan Continue to assess for safest/least restrictive diet recommendation.   SLP Discharge Recommendation home with home health   SLP  Rationale for DC Rec Pt's son reported that he is pt's full time caregiver and is planning on bringing pt home at time of discharge. Pt would benefit from continued assessment of swallow functions through home health SLP services at time of discharge.   SLP Brief overview of current status  Pt seen this afternoon for clinical swallow evaluation. Pt observed to be more alert when compared to previous evaluation attempts and was able to maintain alertness for PO intake. Pt tolerating IDDSI 4 (pureed) and IDDSI 3 (moderately/honey thickened liquids) via spoon only with no overt signs/symptoms of aspiration. During IDDSI 2 (mildly/nectar thickened liquid) trials, pt had throat clearing and a wet/gurgly vocal quality after the swallow. Due to pt being minimally verbal, vocal quality was unable to be assessed during PO intake however during IDDSI 4/3 trials, no coughing, throat clearing, or wet breath quality was observed. Recommend IDDSI 4 (pureed) and IDDSI 3 (moderately/honey) thickened liquids. Pt will require a 1:1 feeder and all PO intake should be provided via spoon only. Recommend strict aspiration precautions be followed and PO intake should only be completed when pt is able to remain alert. Provided pt's son with education regarding recommendations and POC, he expressed understanding and denied having any additional questions at this time.   Total Session Time   Total Session Time (sum of timed and untimed services) 25       Recommendations for Feeding:  - Patient requires 1:1 feeder with eating and drinking  - Patient must sit in the chair at 90 degrees (may require pillows behind back)  - Eliminate all distractions; turn off the TV  -Provide all PO intake (liquids and solids) via spoon only   - Patient should only eat/drink when they are fully alert  - Provide small bites and small sips  - Alternate between a bite of food and a sip of liquid  - Check for oral pocketing  - Swallow bite of food/liquid before  offering another bite/sip   - Patient must remain upright in chair at least 30-45 minutes after oral intake    NOTE: If patient becomes too tired, there are noted changes in their vocal quality/breathing (wet and gurgly), or they begin coughing frequently, stop feeding immediately and complete oral cares. SLP to re-assess swallowing

## 2024-01-30 NOTE — PROGRESS NOTES
01/30/24 1400   Appointment Info   Signing Clinician's Name / Credentials (OT) Kristin Ghosh OTR/L   Living Environment   People in Home child(monique), adult   Current Living Arrangements house   Home Accessibility no concerns   Transportation Anticipated health plan transportation   Living Environment Comments Information gathered through chart review. Pt lives with her son in a 1 level home, no steps to enter. Bathroom has a walk in shower with grab bars and chair.   Self-Care   Usual Activity Tolerance fair   Current Activity Tolerance poor   Equipment Currently Used at Home grab bar, tub/shower;wheelchair, manual;shower chair;walker, rolling   Fall history within last six months yes   Number of times patient has fallen within last six months   (at least 1 resulting in this hospitalization)   Activity/Exercise/Self-Care Comment Per chart review, pt was able to walk around the home with CGA and FWW up until about 2 months ago when her R hip hardware began to deteriorate causing significant pain. Family stated pt has seen ortho, but no surgery planned unless an acute fx occurs. Per family, ortho did request pt sleep in a recliner and recently pt has only been able to complete pivot transfers recliner<->commode with family assist. Family assists with all ADLs.   Instrumental Activities of Daily Living (IADL)   IADL Comments Family manages IADLs and they typically call for medical transport for any medical appointments.   General Information   Onset of Illness/Injury or Date of Surgery 01/26/24   Referring Physician Dr. Whitlock   Patient/Family Therapy Goal Statement (OT) Return home with home care services   Additional Occupational Profile Info/Pertinent History of Current Problem Pt seen in ED on 1/23/24 for UTI and now admitted d/t having a fall on 1/26/24 hitting her head (brain and head CT negative for any acute changes) as well as for complicated UTI with acute on chronic metabolic encephalopathy. Pt also has  existing dx of dementia.   General Observations and Info Son present and assisted with communicating to pt what plan for therapy was. Pt unable to hear this writer with the pocket talker   Cognitive Status Examination   Orientation Status other (see comments)  (unable to assess due to cognition/hearing)   Affect/Mental Status (Cognitive) confused   Follows Commands 25-49% accuracy   Safety Deficit unable/difficult to assess   Memory Deficit unable/difficult to assess   Posture   Posture kyphosis   Range of Motion Comprehensive   Comment, General Range of Motion functional   Strength Comprehensive (MMT)   Comment, General Manual Muscle Testing (MMT) Assessment generalized weakness noted   Bed Mobility   Bed Mobility supine-sit   Supine-Sit [place] (Bed Mobility) moderate assist (50% patient effort)   Assistive Device (Bed Mobility) draw sheet   Comment (Bed Mobility) pt does not sleep in a bed at home   Transfers   Transfers sit-stand transfer;toilet transfer   Sit-Stand Transfer   Sit-Stand [place] (Transfers) minimum assist (75% patient effort);moderate assist (50% patient effort);verbal cues;nonverbal cues (demo/gesture);1 person to manage equipment;2 person assist   Sit/Stand Transfer Comments attempted FWW but this was not working so removed and pt did better without. pt having a hard time hearing therapists so more gestures were used   Toilet Transfer   Type (Toilet Transfer) sit-stand;stand-sit   [place] Level (Toilet Transfer) minimum assist (75% patient effort);moderate assist (50% patient effort);verbal cues;nonverbal cues (demo/gesture);1 person to manage equipment;2 person assist   Toilet Transfer Comments pt having a hard time hearing therapists so more gestures were used   Activities of Daily Living   BADL Assessment/Intervention lower body dressing;toileting   Lower Body Dressing Assessment/Training   [place] Level (Lower Body Dressing) lower body dressing skills;dependent (less  than 25% patient effort)   Toileting   Position (Toileting) supported standing   [place] Level (Toileting) toileting skills;dependent (less than 25% patient effort)   Clinical Impression   Criteria for Skilled Therapeutic Interventions Met (OT) Evaluation only;No significant expected improvement in functional status  (in regards to ADLs (dressing, toileting, grooming, bathing, etc))   Influenced by the following impairments decreased activity tolerance, pain, hearing, cognition, strength   Assessment of Occupational Performance 1-3 Performance Deficits   Identified Performance Deficits ADLs, functional mobility   Clinical Decision Making Complexity (OT) problem focused assessment/low complexity   Clinical Impression Comments At baseline, pt has been limited due to R hip hardware deteriorating causing pain and no surgery currently planned unless an acute fx occurs. Therefore, pt has been only completing pivot transfers recliner<>commode and family assists with all ADLs. Pt did transfer today with min-modA and verbal/nonverbal cues, maybe mildly limited compared to her baseline (family did not observe this so difficult to compare). She appeared to hear/respond better to her son than therapists. No further skilled OT services are necessary in the acute care setting at this time. Pt can continue working with PT to progress her mobility but she already receives assistance with all ADLs, and family is planning on taking her home with home care. Will complete OT order.   OT Total Evaluation Time   OT Lee, Low Complexity Minutes (79662) 20   OT Discharge Planning   OT Plan Discharge skilled OT   OT Discharge Recommendation (DC Rec) home with assist   OT Rationale for DC Rec At baseline, pt has been limited due to R hip hardware deteriorating causing pain and no surgery currently planned unless an acute fx occurs. Therefore, pt has been only completing pivot transfers recliner<>commode and family assists with all ADLs.  Pt did transfer today with min-modA and verbal/nonverbal cues, maybe mildly limited compared to her baseline (family did not observe this so difficult to compare). She appeared to hear/respond better to her son than therapists. No further skilled OT services are necessary in the acute care setting at this time. Pt can continue working with PT to progress her mobility but she already receives assistance with all ADLs, and family is planning on taking her home with home care. Will complete OT order.   OT Brief overview of current status min-modA supine to sit, sit to stand, bed to commode, & commode to chair with verbal and nonverbal cues due to cognition and hearing. totalA for dressing and toileting   Total Session Time   Total Session Time (sum of timed and untimed services) 20        SUICIDE/SELF-INJURIOUS BEHAVIOR SUICIDE/SELF-INJURIOUS BEHAVIOR

## 2024-01-31 NOTE — PROGRESS NOTES
01/30/24 2100   Appointment Info   Signing Clinician's Name / Credentials (PT) Patricia Gonsalves DPT   Therapeutic Activity   Therapeutic Activities: dynamic activities to improve functional performance Minutes (28311) 15   Symptoms Noted During/After Treatment Fatigue   Treatment Detail/Skilled Intervention Pt resting in bed at start of session. Pt required repeated instructions for participation. Completed supine to sit transfer c mod A. Pt needed to use commode. Unable to follow instructions for transfer c FWW. Pt was able to complete squat pivot transfer to commode with min to mod A from PT in front of pt and cues for hand placement. Completed squat pivot transfer to recliner after using commode c min-mod A. Max A for scooting back in recliner. Pt resting in recliner comfortably at end of session   PT Discharge Planning   PT Plan Progress LE strength and functional mobility as able.   PT Discharge Recommendation (DC Rec) home with assist;home with home care physical therapy   PT Rationale for DC Rec Family plans to take pt home and assist as needed   PT Brief overview of current status min to mod A for pivot transfer   Total Session Time   Timed Code Treatment Minutes 15   Total Session Time (sum of timed and untimed services) 15

## 2024-01-31 NOTE — PROGRESS NOTES
[facility]    Medicine Progress Note - Hospitalist Service    Date of Admission:  1/26/2024    Assessment & Plan   # altered mental status, hospital acquired RLL pneumonia  -1/30: Patient was lethargic all day yesterday to the point where it was unsafe for patient to take any p.o.  Workup revealed hyperventilation with a increased AA gradient and chest x-ray showed right lower lobe infiltrate consistent with pneumonia.  Patient was started on meropenem and vancomycin last night.  -1/31: Patient woke up finally yesterday afternoon after diuresis.  CT angio of the chest showed no evidence of PE or pneumonia but rather bilateral pleural effusion; findings are rather consistent with the CHF and does not look like a pneumonia.  Will stop antibiotics at this point.    #  Acute CHF, Atrial fibrillation  Patient's heart rate is controlled.  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  -1/28: episode of CHF exacerbation last night presumably from fluid overload.  Improved with furosemide.  Will check echo tomorrow.  -1/29: Patient remains hemodynamically stable with stable respiratory status on room air.  Check echo today.  -1/30: Echocardiogram showed normal left ventricular function and EF of 60 to 65%.  Right ventricle was also normal size.  Patient did show moderate left atrial enlargement.  -1/31: Patient appears to be in acute exacerbation of her CHF from fluid overload with aortic stenosis contributing to the heart failure process.  Will continue with diuresis and follow-up patient's symptoms.    # s/p fall episode with possible syncope  Patient apparently fell at home secondary to possible syncope.  Patient's physical examination shows harsh systolic murmur at the apex and patient may have significant valvular disease leading to her fall.  Will order echocardiogram and follow.  CT head and CT orbital showed no acute  changes  -1/28: pt's daughter reports that when she fell, it was a mechanical fall and not a syncope.  Echo has been ordered for tomorrow.  -1/29: Patient has remained stable.  Check echocardiogram today.  PT/OT today.  -1/30: Echocardiogram showed moderate aortic stenosis which is a significant change for worse from previous echocardiogram.  This may be contributing to her fall episodes.  -1/31: Slowly advance activity as tolerated    #   Acute cystitis with E. coli  Patient has been treated with cephalexin and currently is on ceftriaxone.  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  -1/29: Patient is having mild delirium at night with the hallucinations but no agitation episodes    # Dysphagia  -1/31: Speech therapy evaluated patient swallowing yesterday and she does have oropharyngeal dysphagia.  Patient is currently on puréed diet    #  Code status:  Spoke with daughter regarding patient's previously known wishes.  She does not want aggressive measures and does not want to be in vegetative state on life support system, consistent with DNR and DNI status.          Diet: Pureed Diet (level 4) Liquidized/Moderately Thick (level 3)    DVT Prophylaxis: Enoxaparin (Lovenox) SQ  Reed Catheter: Not present  Lines: None     Cardiac Monitoring: ACTIVE order. Indication: Acute decompensated heart failure (48 hours)  Code Status: No CPR- Do NOT Intubate      Clinically Significant Risk Factors   Low white count     # Hypokalemia: Lowest K = 3.3 mmol/L in last 2 days, will replace as  needed       # Hypoalbuminemia: Lowest albumin = 3 g/dL at 1/31/2024  5:08 AM, will monitor as appropriate     # Hypertension: Noted on problem list     # Dementia: noted on problem list               Disposition Plan           Altaf Toribio MD  Hospitalist Service  [facility]  Securely message with [employer] (more info)  Text page via [facility] Paging/Directory   ______________________________________________________________________    Interval History   Yesterday, patient's CT angio chest showed no evidence of pulmonary embolism but it did show bilateral pleural effusion.  No evidence of pulmonary infiltrate.  Last night, patient was up until 3 AM then began sleeping.  No episode of agitation and her respiratory status remained stable overnight.    Physical Exam   Vital Signs: Temp: 97.3  F (36.3  C) Temp src: Tympanic BP: 141/77 Pulse: 76   Resp: 18 SpO2: 96 % O2 Device: None (Room air)    Weight: 131 lbs 13.36 oz    General Appearance: In NAD  Respiratory: CTA bilaterally  Cardiovascular: irregulary irregular, harsh systolic murmur loudest at the apex, no lower extremity edema  GI: soft NT ND   Skin: intact  Other: Neuro:  alert but not oriented.  Moving 4 ext     Medical Decision Making       55 MINUTES SPENT BY ME on the date of service doing chart review, history, exam, documentation & further activities per the note.      Data     I have personally reviewed the following data over the past 24 hrs:    3.7 (L)  \   10.0 (L)   / 280     138 106 12.5 /  94   4.3 21 (L) 0.94 \     ALT: N/A AST: N/A AP: N/A TBILI: N/A   ALB: 3.0 (L) TOT PROTEIN: N/A LIPASE: N/A     Procal: N/A CRP: N/A Lactic Acid: 1.8       INR:  N/A PTT:  N/A   D-dimer:  1.35 (H) Fibrinogen:  N/A       Urine Culture  Specimen Information: Urine, Catheter   2 Result Notes  Culture >100,000 CFU/mL Escherichia coli Abnormal    >100,000 CFU/mL Alpha hemolytic Streptococcus Abnormal         Susceptibility   RAIZA     Ampicillin 4 Susceptible      Ampicillin/ Sulbactam 4 Susceptible     Cefazolin <=4 Susceptible     Cefepime <=1 Susceptible     Ceftazidime <=1 Susceptible     Ceftriaxone <=1 Susceptible     Ciprofloxacin >=4 Resistant     Ertapenem <=0.5 Susceptible     Gentamicin <=1 Susceptible     Imipenem <=0.25 Susceptible     Levofloxacin >=8 Resistant     Nitrofurantoin <=16 Susceptible     Piperacillin/Tazobactam <=4 Susceptible     Tobramycin <=1 Susceptible     Trimethoprim/Sulfamethoxazole <=1/19 Susceptible               Imaging results reviewed over the past 24 hrs:   Recent Results (from the past 24 hour(s))   CTA Chest with Contrast    Narrative    PROCEDURE: CTA CHEST WITH CONTRAST 1/30/2024 2:59 PM    HISTORY: hypoxia; elevated D-dimer; evaluate for acute PE    COMPARISONS: None.    Meds/Dose Given: Isovue 370 52ml Given    TECHNIQUE: CT angiogram of the chest with sagittal and coronal MIPS  reconstructions.    FINDINGS: There are no pulmonary emboli. The thoracic aorta appears  normal. The heart is mildly enlarged.    There are bilateral pleural effusions noted. There is a large hiatal  hernia.    The hilar and mediastinal lymph nodes are normal in caliber.    Interstitial thickening is seen in both lungs.    The upper portion of the liver spleen and pancreas are normal.  Degenerative changes are present in the thoracic spine.         Impression    IMPRESSION: Mild cardiomegaly with bilateral pleural effusions.    Interstitial thickening in both lungs    No pulmonary emboli.    ELSA MCQUEEN MD         SYSTEM ID:  [id number]

## 2024-01-31 NOTE — PROGRESS NOTES
01/31/24 1100   Appointment Info   Signing Clinician's Name / Credentials (PT) Luzmaria Olsen PTA   PT Assistant Visit Number 1   Therapeutic Activity   Therapeutic Activities: dynamic activities to improve functional performance Minutes (06164) 20   Symptoms Noted During/After Treatment Fatigue   Treatment Detail/Skilled Intervention Pt was up in the recliner with her daughter by her side. Placed the pocket talker on so she could semi hear us. Pt stood two times 3 seconds and 4 seconds with minA2 with FWW but had to sit down due to weakness. Pt was pleasantly confused today but overall was pleasant.   PT Discharge Planning   PT Plan Progress LE strength and functional mobility as able.   PT Discharge Recommendation (DC Rec) home with assist;home with home care physical therapy   PT Rationale for DC Rec Family plans to take pt home and assist as needed   PT Brief overview of current status Pt was min A2 sit to stand. When placing the transfer belt on pt the belt pinched her nipple on the left side. PTA checked the left breast there was no evidence of skin break down. Talked with RN she was going to f/u and check her skin on the left breast.

## 2024-01-31 NOTE — PROGRESS NOTES
"   01/31/24 1000   Appointment Info   Signing Clinician's Name / Credentials (SLP) Lili Aguilera MS CCC-SLP   Interventions   Interventions Quick Adds Swallowing Dysfunction   Swallowing Dysfunction &/or Oral Function for Feeding   Treatment of Swallowing Dysfunction &/or Oral Function for Feeding Minutes (16542) 18   Symptoms Noted During/After Treatment None   Treatment Detail/Skilled Intervention Pt seen this AM for skilled PO trials. Pt's daughter present at bedside. Pt's daughter reported that pt was more alert this AM and closer to the \"mom that they know\". Pt tolerating +10/10 moderately/honey thickened liquids and +10/10 pureed texture trials via spoon without overt signs/symptoms of aspiration. Pt then tolerating +15/15 mildly/nectar thickened liquids via spoon without overt signs/symptoms of aspiration. During cup sip trials of mildly/nectar thickened liquids, pt demonstrating an overt cough following +1/3 trials. Due to pt's fatigue and decreased overall alertness, additional advanced texture trials were held at this time. Recommend continuation of IDDSI 4 (pureed) and upgrade to IDDSI 2 (mildly/nectar) thickened liquids via spoon only.  Recommend strict aspiration precautions be followed and PO intake should only be completed when pt is able to remain alert.   SLP Discharge Planning   SLP Plan Continue to assess for safest/least restrictive diet recommendation.   SLP Discharge Recommendation home with home health   SLP Rationale for DC Rec Pt's son reported that he is pt's full time caregiver and is planning on bringing pt home at time of discharge. Pt would benefit from continued assessment of swallow functions through home health SLP services at time of discharge.   SLP Brief overview of current status  Recommend continuation of IDDSI 4 (pureed) and upgrade to IDDSI 2 (mildly/nectar) thickened liquids via spoon only. Recommend strict aspiration precautions be followed and PO intake should only be " completed when pt is able to remain alert.   Total Session Time   Total Session Time (sum of timed and untimed services) 18       Recommendations for Feeding:  - Patient requires 1:1 feeder with eating and drinking  - Patient must sit in the chair at 90 degrees (may require pillows behind back)  - Eliminate all distractions; turn off the TV  -Provide all PO intake (liquids and solids) via spoon only   - Patient should only eat/drink when they are fully alert  - Provide small bites and small sips  - Alternate between a bite of food and a sip of liquid  - Check for oral pocketing  - Swallow bite of food/liquid before offering another bite/sip   - Patient must remain upright in chair at least 30-45 minutes after oral intake     NOTE: If patient becomes too tired, there are noted changes in their vocal quality/breathing (wet and gurgly), or they begin coughing frequently, stop feeding immediately and complete oral cares. SLP to re-assess swallowing

## 2024-01-31 NOTE — PROGRESS NOTES
01/31/24 1100   Appointment Info   Signing Clinician's Name / Credentials (PT) Luzmaria Olsen PTA   PT Assistant Visit Number 1   Rehab Comments (PT) Knocked on the door due to curtain being closed the RN was in with the pt. Stated I was from PT and the nurse stated to hold PT due to waiting for the surgeon to come check the pt out.   Therapeutic Activity   Therapeutic Activities: dynamic activities to improve functional performance Minutes (09222) 20   Symptoms Noted During/After Treatment Fatigue   Treatment Detail/Skilled Intervention Pt was up in the recliner with her daughter by her side. Placed the pocket talker on so she could semi hear us. Pt stood two times 3 seconds and 4 seconds with minA2 with FWW but had to sit down due to weakness. Pt was pleasantly confused today but overall was pleasant.   PT Discharge Planning   PT Plan Progress LE strength and functional mobility as able.   PT Discharge Recommendation (DC Rec) home with assist;home with home care physical therapy   PT Rationale for DC Rec Family plans to take pt home and assist as needed   PT Brief overview of current status Pt was min A2 sit to stand. When placing the transfer belt on pt the belt pinched her nipple on the left side. PTA checked the left breast there was no evidence of skin break down. Talked with RN she was going to f/u and check her skin on the left breast.   Total Session Time   Timed Code Treatment Minutes 20   Total Session Time (sum of timed and untimed services) 20

## 2024-01-31 NOTE — PLAN OF CARE
Goal Outcome Evaluation: VS as charted, afebrile. Remains on room air. Rate controlled afib on telemetry per ICU RN. IV abx infused per order. Incontinent of urine. Repositioned and incontinence checks completed q2hr. Patient alert, opens eyes spontaneously overnight. Oriented to self only, pleasantly confused. Communicating with pocket talker. Bed alarm in place for safety.        Face to face report given with opportunity to observe patient.    Report given to Ewa Antonio RN   1/31/2024  7:17 AM

## 2024-02-01 NOTE — PROGRESS NOTES
"   02/01/24 1100   Appointment Info   Signing Clinician's Name / Credentials (SLP) Cristi Howe MS CF-SLP   SLP Goals   Therapy Frequency (SLP Eval) 5 times/week   SLP Predicted Duration/Target Date for Goal Attainment 02/09/24   SLP Goals Swallow   SLP: Safely tolerate diet without signs/symptoms of aspiration Minced & moist diet;Thin liquids;With assistance/supervision   Interventions   Interventions Quick Adds Swallowing Dysfunction   Swallowing Dysfunction &/or Oral Function for Feeding   Treatment of Swallowing Dysfunction &/or Oral Function for Feeding Minutes (80472) 15   Symptoms Noted During/After Treatment None   Treatment Detail/Skilled Intervention Pt seen this AM for skilled PO trials. Pt's daughter and 1:1 aid present at bedside. Pt's daughter reported that pt continues to be more alert and has \"came back to the mom I know\". Pt tolerated +4/4 pureed texture trials via spoon without overt signs/symptoms of aspiration. Pt then tolerating +8/8 mildly/nectar thickened liquids via spoon without overt signs/symptoms of aspiration. Daughter reported at baseline the pt frequently coughed/choked on regualr liquids, such as water. Advanced trials of liquids not attempted due to family cocnerns. Advanced diet trials not attempted with solid consistencies due to increased fatigue and pt refusal. Recommend continuation of IDDSI 4 (pureed) and IDDSI 2 (mildly/nectar) thickened liquids via spoon only.  Recommend strict aspiration precautions be followed and PO intake should only be completed when pt is able to remain alert.   SLP Discharge Planning   SLP Plan Discharge from Skilled speech therapy services due to D/C from facility.   SLP Discharge Recommendation home with home health   SLP Rationale for DC Rec Pt's son reported that he is pt's full time caregiver and is planning on bringing pt home at time of discharge. Pt would benefit from continued assessment of swallow functions through home health SLP " services at time of discharge.   SLP Brief overview of current status  Recommend continuation of IDDSI 4 (pureed) and IDDSI 2 (mildly/nectar) thickened liquids via spoon only. Recommend strict aspiration precautions be followed and PO intake should only be completed when pt is able to remain alert.   Total Session Time   Total Session Time (sum of timed and untimed services) 15   Psychosocial Support   Trust Relationship/Rapport care explained;choices provided;emotional support provided;thoughts/feelings acknowledged   Recommendations for Feeding:  - Patient requires 1:1 feeding support with eating and drinking  - Patient must sit in the chair at 90 degrees (may require pillows behind back)  - Eliminate all distractions; turn off the TV  - Patient should only eat/drink when they are fully alert  - Bites and sips via spoon only  - Alternate between a bite of food and a sip of liquid  - Check for oral pocketing  - Swallow bite of food/liquid before offering another bite/sip   - Patient must remain upright in chair at least 30-45 minutes after oral intake    NOTE: If patient becomes too tired, there are noted changes in their vocal quality/breathing (wet and gurgly), or they begin coughing frequently, stop feeding immediately and complete oral cares. SLP to re-assess swallowing

## 2024-02-01 NOTE — PLAN OF CARE
Patient discharged at 12:00 PM via wheel chair accompanied by daughter and staff. Prescriptions sent to patients preferred pharmacy. All belongings sent with patient.     Discharge instructions reviewed with daughter. Listed belongings gathered and returned to patient. Yes    Patient discharged to home w son.   Report called to NA    Surgical Patient   Surgical Procedures during stay: No  Did patient receive discharge instruction on wound care and recognition of infection symptoms? Yes    MISC  Follow up appointment made:  Yes  Home medications returned to patient: N/A  Patient reports pain was well managed at discharge: Yes

## 2024-02-01 NOTE — DISCHARGE INSTRUCTIONS
[facility] [phone]    The Senior Linkage Line can be reached at [phone]. They are a free options counseling service of the [place] [employer]-a state agency. They can help with:  *any Medicare questions  *Prescription Drug help   *Future planning including long-term care financing options  *Paperwork help and applying for federal or state programs  *Caregiver planning and support including home and community based services  *And other services for older adults

## 2024-02-01 NOTE — PROGRESS NOTES
Speech Language Therapy Discharge Summary    Reason for therapy discharge:    Discharged to home with home therapy.    Progress towards therapy goal(s). See goals on Care Plan in [facility] electronic health record for goal details.  Goals partially met.  Barriers to achieving goals:   limited tolerance for therapy and discharge from facility.    Therapy recommendation(s):    Continued therapy is recommended.  Rationale/Recommendations:  Pt's current diet is IDDSI 4 (pureed) / 2 (mildly thickened liquids), which is below reported baseline. Skilled speech therapy services are recommended in order to advance diet as pt is able to tolerate.

## 2024-02-01 NOTE — DISCHARGE SUMMARY
[facility]    Discharge Summary  Hospitalist    Date of Admission:  1/26/2024  Date of Discharge:  2/1/2024  Discharging Provider: Chintan Mejia MD, MD  Date of Service (when I saw the patient): 02/01/24    Discharge Diagnoses   Principal Problem:    Urinary tract infection in elderly patient  Active Problems:    Delirium    Anemia, iron deficiency      History of Present Illness   Bianca Greene is an 89 year old female who presented with altered mental status.  Please see admission H+P for additional details.    Hospital Course   Bianca Greene was admitted on 1/26/2024.  89-year-old female admitted for altered mental status secondary to UTI.  Urine culture ultimately grew E. coli that was sensitive to ceftriaxone.  Patient did finish 7-day course of antibiotics.  Cognitive status improved, but not without periods of delirium and hallucinations.  The patient is significantly weak, and more likely to thrive in subacute rehabilitation versus long-term care, however family is committed to take the patient home.  The following problems were addressed during her hospitalization:    Altered mental status 2/2 CHF  -1/30: Patient was lethargic all day yesterday to the point where it was unsafe for patient to take any p.o.  Workup revealed hyperventilation with a increased AA gradient and chest x-ray showed right lower lobe infiltrate consistent with pneumonia.  Patient was started on meropenem and vancomycin last night.  -1/31: Patient woke up finally yesterday afternoon after diuresis.  CT angio of the chest showed no evidence of PE or pneumonia but rather bilateral pleural effusion; findings are rather consistent with the CHF and does not look like a pneumonia.  Will stop antibiotics at this point.  -2/1: No acute events overnight.  Stable on room air.  Off IVs now.  Ideally would be better at SNF, but family committed to taking patient home.     #  Acute CHF, Atrial fibrillation  Patient's heart  rate is controlled.  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  -1/28: episode of CHF exacerbation last night presumably from fluid overload.  Improved with furosemide.  Will check echo tomorrow.  -1/29: Patient remains hemodynamically stable with stable respiratory status on room air.  Check echo today.  -1/30: Echocardiogram showed normal left ventricular function and EF of 60 to 65%.  Right ventricle was also normal size.  Patient did show moderate left atrial enlargement.  -1/31: Patient appears to be in acute exacerbation of her CHF from fluid overload with aortic stenosis contributing to the heart failure process.  Will continue with diuresis and follow-up patient's symptoms.  -2/1: Patient is on RA.  Ideally would be better at SNF, but family is taking her home.     # s/p fall episode with possible syncope  Patient apparently fell at home secondary to possible syncope.  Patient's physical examination shows harsh systolic murmur at the apex and patient may have significant valvular disease leading to her fall.  Will order echocardiogram and follow.  CT head and CT orbital showed no acute changes  -1/28: pt's daughter reports that when she fell, it was a mechanical fall and not a syncope.  Echo has been ordered for tomorrow.  -1/29: Patient has remained stable.  Check echocardiogram today.  PT/OT today.  -1/30: Echocardiogram showed moderate aortic stenosis which is a significant change for worse from previous echocardiogram.  This may be contributing to her fall episodes.  -1/31: Slowly advance activity as tolerated  -2/1: Patient would likely be better at SNF, but family is taking her home     #   Acute cystitis with E. coli  Patient has been treated with cephalexin and currently is on ceftriaxone.  -1/28: continue with ceftriaxone  -1/29: Continue with the ceftriaxone IV patient appears to be clinically improving  -1/30:  Continuing with antibiotic therapy with the meropenem  -1/31: Patient has received a 7 day course of antibiotics; will stop at this point and follow clinically  -2/1: Completed course of ceftriaxone.     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  -1/29: Patient is having mild delirium at night with the hallucinations but no agitation episodes     # Dysphagia  -1/31: Speech therapy evaluated patient swallowing yesterday and she does have oropharyngeal dysphagia.  Patient is currently on puréed diet  -2/1: Ordered home care SLP    Chintan Mejia MD        Significant Results and Procedures   See below    Pending Results   These results will be followed up by Mell Burrell    Unresulted Labs Ordered in the Past 30 Days of this Admission       No orders found from 12/27/2023 to 1/27/2024.            Code Status   DNR / DNI       Primary Care Physician   Mell Burrell    Physical Exam   Temp: 97.7  F (36.5  C) Temp src: Tympanic BP: 176/70 (PO metoprolol given) Pulse: 70   Resp: 20 SpO2: 93 % O2 Device: None (Room air)    Vitals:    01/30/24 0605 01/31/24 0600 02/01/24 0353   Weight: 58.9 kg (129 lb 13.6 oz) 59.8 kg (131 lb 13.4 oz) 59.7 kg (131 lb 9.8 oz)     Vital Signs with Ranges  Temp:  [97.4  F (36.3  C)-98.4  F (36.9  C)] 97.7  F (36.5  C)  Pulse:  [] 70  Resp:  [20-22] 20  BP: (135-176)/(70-98) 176/70  SpO2:  [93 %-98 %] 93 %  I/O last 3 completed shifts:  In: 50 [P.O.:50]  Out: -     Constitutional: Frail elderly female  Eyes: PERRLA, no injection, no icterus  HEENT: Old bruising, healed abrasions with eschar left face  Respiratory: CTA b/l  Cardiovascular: S1 S2 RRR loud 4 out of 6 systolic murmur  GI: soft, NT, ND, + bowel sounds  Lymph/Hematologic: no palpable lymphadenopathy  Skin: no rashes, no lesions  Musculoskeletal: No edema, good  tone, no deformities  Neurologic: oriented x 1, no focal deficits  Psychiatric: flat affect      Discharge Disposition   Discharged to home  Condition at discharge: Fair    Consultations This Hospital Stay   PHYSICAL THERAPY ADULT IP CONSULT  OCCUPATIONAL THERAPY ADULT IP CONSULT  SPEECH LANGUAGE PATH ADULT IP CONSULT  SPEECH LANGUAGE PATH ADULT IP CONSULT    Time Spent on this Encounter   Chintan URBINA MD, personally saw the patient today and spent greater than 30 minutes discharging this patient.    Discharge Orders      Follow-Up with Cardiology [facility] Heart Failure Discharge      Home Care Referral      Reason for your hospital stay    UTI, CHF     Follow-up and recommended labs and tests     Follow up with primary care provider, Mell Burrell, within 7 days for hospital follow- up.  No follow up labs or test are needed.     Activity    Your activity upon discharge: activity as tolerated     Diet    Follow this diet upon discharge: Orders Placed This Encounter      Pureed Diet (level 4) Mildly Thick (level 2)     Discharge Medications   Current Discharge Medication List        START taking these medications    Details   metoprolol tartrate (LOPRESSOR) 25 MG tablet Take 1 tablet (25 mg) by mouth every 12 hours  Qty: 60 tablet, Refills: 1    Associated Diagnoses: Primary hypertension           CONTINUE these medications which have NOT CHANGED    Details   ammonium lactate (AMLACTIN) 12 % external cream Apply topically 2 times daily as needed for dry skin      aspirin 81 MG EC tablet Take 81 mg by mouth daily as needed for pain      oxyCODONE-acetaminophen (PERCOCET) 5-325 MG tablet Take 0.5 tablets by mouth 2 times daily as needed for pain           STOP taking these medications       cephALEXin (KEFLEX) 500 MG capsule Comments:   Reason for Stopping:             Allergies   No Known Allergies  Data   Most Recent 3 CBC's:  Recent Labs   Lab Test 02/01/24  0511 01/31/24  0508 01/30/24  0512   WBC 3.7* 3.7*  4.2   HGB 9.2* 10.0* 9.3*   MCV 73* 72* 72*    280 244      Most Recent 3 BMP's:  Recent Labs   Lab Test 02/01/24  0511 01/31/24  0508 01/30/24  1528    138 136   POTASSIUM 4.3 4.3 4.0   CHLORIDE 106 106 106   CO2 23 21* 19*   BUN 18.1 12.5 10.1   CR 0.88 0.94 0.90   ANIONGAP 8 11 11   LUKE 9.2 9.3 9.1   * 94 131*     Most Recent 2 LFT's:  Recent Labs   Lab Test 01/29/24  1536 01/29/24  0701   AST 20 25   ALT 9 8   ALKPHOS 108 103   BILITOTAL 0.3 0.3     Most Recent INR's and Anticoagulation Dosing History:  Anticoagulation Dose History          Latest Ref Rng & Units 4/27/2022   Recent Dosing and Labs   INR 0.85 - 1.15 1.03      Most Recent 3 Troponin's:  Recent Labs   Lab Test 10/31/19  0001 10/30/19  1803 10/30/19  1001   TROPI 0.047* 0.064* 0.049*     Most Recent Cholesterol Panel:No lab results found.  Most Recent 6 Bacteria Isolates From Any Culture (See EPIC Reports for Culture Details):  Recent Labs   Lab Test 10/30/19  1330 10/30/19  1020 10/30/19  1001 10/30/19  0943   CULT No MRSA isolated No growth after 6 days No growth after 6 days >100,000 colonies/mL  Escherichia coli  *     Most Recent TSH, T4 and A1c Labs:No lab results found.  Results for orders placed or performed during the hospital encounter of 01/26/24   CT Head w/o Contrast    Narrative    Exam: CT HEAD W/O CONTRAST    Clinical history:89 years Female AMS  ??13 hours prior to  presentation, the patient fell out of her chair and smashed her face  on the ground. ?There was no reported loss of consciousness.??She is  on aspirin and I do not see any anticoagulation.       Comparisons: 4/5/2023    Technique: Axial CT imaging of the head was performed Without  intervenous contrast.  This exam was performed using one or more of the following dose  reduction techniques:  Automated exposure control, adjustment of the PACO and/or KV according  to patient's size, and/or use of iterative reconstruction technique.    FINDINGS: There is  generalized cerebral and cerebellar volume loss.   Ventricles and sulci are symmetric. The gray-white matter  differentiation throughout the brain is well maintained. There is  advanced periventricular white matter change of chronic small vessel  ischemic disease. There is no evidence of intracranial mass or  hemorrhage. Visualized portions of the paranasal sinuses and mastoid  air cells are well aerated. There is no evidence of skull fracture.      Impression    IMPRESSION: No evidence of skull fracture or intracranial hemorrhage.    JOSE SWANSON MD         SYSTEM ID:  [id number]   CT Orbits wo Contrast    Narrative    CT ORBITAL W/O CONTRAST    HISTORY: 89 years Female Trauma    COMPARISON: None    TECHNIQUE: Axial CT imaging of the facial bones was performed. Coronal  sagittal reconstructions were obtained.    FINDINGS: The paranasal sinuses are clear. There is no evidence of  facial fracture. There is no evidence of orbital fracture.      Impression    IMPRESSION: Negative study.    JOSE SWANSON MD         SYSTEM ID:  [id number]   XR Chest Port 1 View    Narrative    XR CHEST PORT 1 VIEW    HISTORY: 89 yearsFemale Shortness of breath    TECHNIQUE: A single view of the chest was performed    COMPARISON: 4/27/2022    FINDINGS: Chronic interstitial changes are again seen. Pulmonary  vascularity is normal to mildly prominent. Lungs are otherwise clear.        Impression    IMPRESSION: Chronic interstitial opacities similar to the prior study.  Question mild early pulmonary edema.    JOSE SWANSON MD         SYSTEM ID:  [id number]   CT Head w/o Contrast    Narrative    Exam: CT HEAD W/O CONTRAST      Exam reason: altered mental status with delirium and lethargy    Technique:   Axial images of the head obtained without contrast. Coronal and  sagittal reformations were generated. This CT was performed using one  or more of the following dose reduction techniques: automated exposure  control, adjustment of  the mA and/or kV according to patient size,  and/or use of iterative reconstruction technique.    Comparison: 1/26/2024       Findings:      Parenchyma: No evidence of intraparenchymal hemorrhage, mass, acute  cortical infarct or prior infarct in a major vascular territory.      Moderate diffuse cerebral volume loss. There is periventricular deep  white matter hypoattenuation, nonspecific but likely due to chronic  microvascular ischemic change. No midline shift. The basilar cisterns  are patent.    Extra-axial spaces: No extra-axial fluid collection or hemorrhage.     Ventricles: Normal.  Paranasal sinuses: Clear.   Mastoid air cells: Clear.    Osseous: No acute findings.  Orbits: Normal.    Soft tissues: Unremarkable.       Impression    Impression:  No acute intracranial abnormality.      FRANK FUNEZ MD         SYSTEM ID:  [id number]   XR Chest Port 1 View    Narrative    PROCEDURE:  XR CHEST PORT 1 VIEW    HISTORY:  altered mental status; increased A-a gradient.     COMPARISON:  1/28/2024    FINDINGS:   The cardiac silhouette is normal in size. The pulmonary vasculature is  normal. There is a small area patchy increase in density in the upper  portion of the right lung suspicious for early pneumonia. This has  worsened as compared to 1/28/2024. There are multiple old left-sided  rib fractures. No pleural effusion or pneumothorax.      Impression    IMPRESSION:  Interval development of a right lung infiltrate as  compared to 1/28/2024      ELSA MCQUEEN MD         SYSTEM ID:  [id number]   CTA Chest with Contrast    Narrative    PROCEDURE: CTA CHEST WITH CONTRAST 1/30/2024 2:59 PM    HISTORY: hypoxia; elevated D-dimer; evaluate for acute PE    COMPARISONS: None.    Meds/Dose Given: Isovue 370 52ml Given    TECHNIQUE: CT angiogram of the chest with sagittal and coronal MIPS  reconstructions.    FINDINGS: There are no pulmonary emboli. The thoracic aorta appears  normal. The heart is mildly enlarged.    There are  bilateral pleural effusions noted. There is a large hiatal  hernia.    The hilar and mediastinal lymph nodes are normal in caliber.    Interstitial thickening is seen in both lungs.    The upper portion of the liver spleen and pancreas are normal.  Degenerative changes are present in the thoracic spine.         Impression    IMPRESSION: Mild cardiomegaly with bilateral pleural effusions.    Interstitial thickening in both lungs    No pulmonary emboli.    ELSA MCQUEEN MD         SYSTEM ID:  [id number]   Echocardiogram Complete     Value    LVEF  60-65%    [facility]    [id number]  [id number]  [id number]  [id number]^LEANNA^JULIO CÉSAR^GERMAN     [facility]  Echocardiography Laboratory  [address] [place] [id number]     Name: CARITO CURIEL  MRN: [id number]  : 1930  Study Date: 2024 10:42 AM  Age: 89 yrs  Gender: Female  Patient Location: [facility]  Reason For Study: Syncope and Collapse, Cardiac Murmur  History: Syncope and Collapse, Murmur  Ordering Physician: JULIO CÉSAR RAM  Performed By: Beulah Coyle RDCS     BSA: 1.6 m2  Height: 63 in  Weight: 130 lb  ______________________________________________________________________________  Procedure  Complete Portable Echo Adult. Technically difficult study. The patient's  rhythm is atrial fibrillation.  ______________________________________________________________________________  Interpretation Summary  Global and regional left ventricular function is normal with an EF of 60-65%.  The right ventricle is normal size.  Moderate left atrial enlargement is present.  Moderate aortic stenosis is present.  The peak aortic velocity is 3.61 m/sec.  The mean gradient across the aortic valve is 27.8 mmHg.  This study was compared with the study from 10/30/2019 .  Aortic stenosis has progressed since last study, Peak velocity was 2.61 M/S  and mean pressure gradient 9.0  Mild to moderate tricuspid insufficiency is present.  The right ventricular  systolic pressure is 30mmHg above the right atrial  pressure.  ______________________________________________________________________________  Left Ventricle  Global and regional left ventricular function is normal with an EF of 60-65%.  Diastolic function not assessed due to atrial fibrillation.     Right Ventricle  The right ventricle is normal size. Global right ventricular function is  mildly reduced.     Atria  Moderate left atrial enlargement is present.     Mitral Valve  Mild mitral annular calcification is present. Trace mitral insufficiency is  present.     Aortic Valve  Severe aortic valve calcification is present. Moderate aortic stenosis is  present. The peak aortic velocity is 3.61 m/sec. The mean gradient across the  aortic valve is 27.8 mmHg. The peak AoV pressure gradient is 52.2 mmHg. Aortic  stenosis has progressed since last study, Peak velocity was 2.61 M/S and mean  pressure gradient 9.0.     Tricuspid Valve  Mild to moderate tricuspid insufficiency is present. The right ventricular  systolic pressure is 30mmHg above the right atrial pressure.     Pulmonic Valve  Mild pulmonic insufficiency is present.     Vessels  Ascending aorta 3.25 cm. IVC diameter <2.1 cm collapsing >50% with sniff  suggests a normal RA pressure of 3 mmHg.     Pericardium  No pericardial effusion is present.     Compared to Previous Study  This study was compared with the study from 10/30/2019 .     ______________________________________________________________________________  MMode/2D Measurements & Calculations  IVSd: 1.2 cm  LVIDd: 3.8 cm  LVIDs: 2.5 cm  LVPWd: 0.99 cm     FS: 33.6 %  LV mass(C)d: 135.9 grams  LV mass(C)dI: 84.4 grams/m2  Ao root diam: 2.1 cm  asc Aorta Diam: 3.3 cm  LVOT diam: 2.0 cm  LVOT area: 3.1 cm2  Ao root diam index Ht(cm/m): 1.3  Ao root diam index BSA (cm/m2): 1.3  Asc Ao diam index BSA (cm/m2): 2.0  Asc Ao diam index Ht(cm/m): 2.0  LA Volume Indexed (AL/bp): 64.5 ml/m2     RWT: 0.52  TAPSE:  1.5 cm     Doppler Measurements & Calculations  Ao V2 max: 361.0 cm/sec  Ao max P.2 mmHg  Ao V2 mean: 244.0 cm/sec  Ao mean P.8 mmHg  Ao V2 VTI: 70.9 cm  BRAEDEN(I,D): 0.60 cm2  BRAEDEN(V,D): 0.55 cm2  LV V1 max P.7 mmHg  LV V1 max: 65.0 cm/sec  LV V1 VTI: 13.7 cm  SV(LVOT): 42.2 ml  SI(LVOT): 26.2 ml/m2  TR max mart: 272.0 cm/sec  TR max P.6 mmHg  AV Mart Ratio (DI): 0.18  BRAEDEN Index (cm2/m2): 0.37  RV S Mart: 5.4 cm/sec     ______________________________________________________________________________  Report approved by: Saul Hanson 2024 02:16 PM

## 2024-02-01 NOTE — PLAN OF CARE
Pt is alert to self, vs and assessments as charted. Denies pain. Somewhat restless for beginning half of shift. Did make several attempts to get out of bed after pt laid down for the night. BP was slightly elevated at 0200 check d/t pt tensing up during check as noted in chart. Sitter at bedside d/t increased level of confusion and restlessness. TELE: AFIB 70-80's per ICU nurses charted report. Pt incontinent of urine this shift. IV SL. Bed is locked and low, call light within reach, close obs, alarms are active and audible.       Face to face report given with opportunity to observe patient.    Report given to GIANNA Robison RN   2/1/2024  7:02 AM

## 2024-02-01 NOTE — PROGRESS NOTES
Healthline scheduled for 1200.  Family and care team updated.  [facility] home health scheduled for Monday per conversation with daughter, Selene.  Resources to look into PCA services included in discharge instructions.

## 2024-02-01 NOTE — PLAN OF CARE
"Goal Outcome Evaluation:      Most recent vitals: /98 (BP Location: Right arm, Patient Position: Sitting, Cuff Size: Adult Regular)   Pulse 93   Temp 97.8  F (36.6  C) (Tympanic)   Resp 20   Ht 1.6 m (5' 3\")   Wt 59.8 kg (131 lb 13.4 oz)   SpO2 97%   BMI 23.35 kg/m      Pt is alert to self. Denies pain all shift. HR tachy. Otherwise VS unremarkable. Sepsis flagged for pt. Lactic 1.9. Assessment as charted. Once pt son family left for the day pt became more restless and confused. Not remembering where she is and stating she is lost. Continued to try and get out of recliner or bed, then walked pt in wheelchair for remainder of shift. Good appetite. Alarms activated when needed and  socks in place.     MD said 2 hour vitals were not longer needed and ok to resume q4hr in care conference.    Face to face report given with opportunity to observe patient.    Report given to GIANNA Reid RN   1/31/2024  7:29 PM    "

## 2024-02-02 NOTE — PROGRESS NOTES
Clinic Care Coordination Contact  [facility]: Post-Discharge Note  SITUATION                                                      Admission:    Admission Date: 01/26/24   Reason for Admission: Urinary tract infection in elderly patient  Active Problems:    Delirium    Anemia, iron deficiency  Discharge:   Discharge Date: 02/01/24  Discharge Diagnosis: Principal Problem:    Urinary tract infection in elderly patient  Active Problems:    Delirium    Anemia, iron deficiency    BACKGROUND                                                      Per hospital discharge summary and inpatient provider notes:  Bianca Greene was admitted on 1/26/2024.  89-year-old female admitted for altered mental status secondary to UTI.  Urine culture ultimately grew E. coli that was sensitive to ceftriaxone.  Patient did finish 7-day course of antibiotics.  Cognitive status improved, but not without periods of delirium and hallucinations.  The patient is significantly weak, and more likely to thrive in subacute rehabilitation versus long-term care, however family is committed to take the patient home.  The following problems were addressed during her hospitalization:     Altered mental status 2/2 CHF  -1/30: Patient was lethargic all day yesterday to the point where it was unsafe for patient to take any p.o.  Workup revealed hyperventilation with a increased AA gradient and chest x-ray showed right lower lobe infiltrate consistent with pneumonia.  Patient was started on meropenem and vancomycin last night.  -1/31: Patient woke up finally yesterday afternoon after diuresis.  CT angio of the chest showed no evidence of PE or pneumonia but rather bilateral pleural effusion; findings are rather consistent with the CHF and does not look like a pneumonia.  Will stop antibiotics at this point.  -2/1: No acute events overnight.  Stable on room air.  Off IVs now.  Ideally would be better at SNF, but family committed to taking patient  home.     #  Acute CHF, Atrial fibrillation  Patient's heart rate is controlled.  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  -1/28: episode of CHF exacerbation last night presumably from fluid overload.  Improved with furosemide.  Will check echo tomorrow.  -1/29: Patient remains hemodynamically stable with stable respiratory status on room air.  Check echo today.  -1/30: Echocardiogram showed normal left ventricular function and EF of 60 to 65%.  Right ventricle was also normal size.  Patient did show moderate left atrial enlargement.  -1/31: Patient appears to be in acute exacerbation of her CHF from fluid overload with aortic stenosis contributing to the heart failure process.  Will continue with diuresis and follow-up patient's symptoms.  -2/1: Patient is on RA.  Ideally would be better at SNF, but family is taking her home.     # s/p fall episode with possible syncope  Patient apparently fell at home secondary to possible syncope.  Patient's physical examination shows harsh systolic murmur at the apex and patient may have significant valvular disease leading to her fall.  Will order echocardiogram and follow.  CT head and CT orbital showed no acute changes  -1/28: pt's daughter reports that when she fell, it was a mechanical fall and not a syncope.  Echo has been ordered for tomorrow.  -1/29: Patient has remained stable.  Check echocardiogram today.  PT/OT today.  -1/30: Echocardiogram showed moderate aortic stenosis which is a significant change for worse from previous echocardiogram.  This may be contributing to her fall episodes.  -1/31: Slowly advance activity as tolerated  -2/1: Patient would likely be better at SNF, but family is taking her home     #   Acute cystitis with E. coli  Patient has been treated with cephalexin and currently is on ceftriaxone.  -1/28: continue with ceftriaxone  -1/29: Continue with the ceftriaxone  IV patient appears to be clinically improving  -1/30: Continuing with antibiotic therapy with the meropenem  -1/31: Patient has received a 7 day course of antibiotics; will stop at this point and follow clinically  -2/1: Completed course of ceftriaxone.     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  -1/29: Patient is having mild delirium at night with the hallucinations but no agitation episodes     # Dysphagia  -1/31: Speech therapy evaluated patient swallowing yesterday and she does have oropharyngeal dysphagia.  Patient is currently on puréed diet  -2/1: Ordered home care SLP       ASSESSMENT           Discharge Assessment  How are you doing now that you are home?: Spoke with son Jamel, who reports patient is doing well since being home. She is tired, but otherwise appears at her baseline. Has all of her current medications and has no questions regarding discharge summary. Has been in contact with [facility] [place], who will be out for their initial intake assessment on Monday 2/5. Has a hospital follow up appointment scheduled with PCP for 2/7 and they do plan to attend this visit. Encouraged to call with any questions that may arise. No further outreach will be made by this RN CC at this time.  How are your symptoms? (Red Flag symptoms escalate to triage hotline per guidelines): Improved  Do you feel your condition is stable enough to be safe at home until your provider visit?: Yes  Does the patient have their discharge instructions? : Yes  Does the patient have questions regarding their discharge instructions? : Yes (see comment)  Were you started on any new medications or were there changes to any of your previous medications? : Yes  Does the patient have all of their medications?: Yes  Do you have questions regarding any of your medications? : No  Do  you have all of your needed medical supplies or equipment (DME)?  (i.e. oxygen tank, CPAP, cane, etc.): Yes  Discharge follow-up appointment scheduled within 14 calendar days? : Yes  Discharge Follow Up Appointment Date: 02/07/24  Discharge Follow Up Appointment Scheduled with?: Primary Care Provider                PLAN                                                      Outpatient Plan:  Spoke with son Jamel, who reports patient is doing well since being home. She is tired, but otherwise appears at her baseline. Has all of her current medications and has no questions regarding discharge summary. Has been in contact with [facility], who will be out for their initial intake assessment on Monday 2/5. Has a hospital follow up appointment scheduled with PCP for 2/7 and they do plan to attend this visit. Encouraged to call with any questions that may arise. No further outreach will be made by this RN CC at this time.     Future Appointments   Date Time Provider Department Center   2/7/2024  2:45 PM Mell Burrell APRN CNP [facility]   3/26/2024 11:30 AM Margie Plummer DPM [facility] [place]         For any urgent concerns, please contact our 24 hour nurse triage line: [phone] ([phone])         Kalani Martin RN

## 2024-02-05 NOTE — TELEPHONE ENCOUNTER
4:26 PM    Reason for Call: Phone Call    Description: Emilee from [facility] needing verbal order for skilled nurse visit 1x weekly for 8 weeks also home health aide 1x week for 8 weeks starting this week 2-5-24    Was an appointment offered for this call? No  If yes : Appointment type              Date    Preferred method for responding to this message: Telephone Call  What is your phone number ? [phone]    If we cannot reach you directly, may we leave a detailed response at the number you provided? Yes    Can this message wait until your PCP/provider returns, if available today? Mirta Valdes

## 2024-02-07 NOTE — COMMUNITY RESOURCES LIST (ENGLISH)
02/07/2024   [facility]  N/A  For questions about this resource list or additional care needs, please contact your primary care clinic or care manager.  Phone: [phone]   Email: N/A   Address: [address] [place] [id number]   Hours: N/A        Food and Nutrition       Food pantry  1  [facility] Distance: 13.87 miles      [facility]   [address] [place] [address]  Language: English  Hours: Mon 9:00 AM - 11:00 AM , Tue 1:00 PM - 3:00 PM , Wed - Thu 9:00 AM - 11:00 AM  Fees: Free, Self Pay   Phone: (297) 548-1838 Email: heavenly@[email or web address] SPARK.org Website: https://central[facility] HospitalZendrive.org/[facility]/[place]     2  [facility] Distance: 16.59 miles      In-Person   [address] [place] [address]  Language: English  Hours: Mon - Thu 11:00 AM - 3:30 PM  Fees: Free   Phone: (872) 583-2362 Email: info@[employer] Website: https://[employer]     SNAP application assistance  3  [facility] Services & Supports [facility] Distance: 14.54 miles      In-Person, Phone/Virtual   [address] [place] [address]  Language: English  Hours: Mon - Fri 8:00 AM - 4:30 PM  Fees: Free   Phone: (537) 952-7710 Website: https://www.[facility]/sddpqaohpjo-b-n/public-health-human-services/economic-services-supports     4  [facility] Distance: 18.21 miles      [address] [place] [address]  Language: English  Hours: Mon - Fri 8:00 AM - 4:30 PM  Fees: Free   Phone: (239) 454-8205 Website: https://www.[place]./232/Health-Human-Services     Soup kitchen or free meals  5  [facility] Distance: 13.87 miles      Pickup   [address] [address]  Language: English  Hours: Mon - Fri 4:00 PM - 4:45 PM  Fees: Free   Phone: (980) 939-7329 Email:  heavenly@[facility].org Website: https://[facility].org/northern/[place]          Transportation       Free or low-cost transportation  6  [employer] [facility] - Free or Low-cost Transportation Distance: 13.34 miles      In-Person   [address] [place] [address]  Language: English  Hours: Mon - Fri 8:00 AM - 4:30 PM  Fees: Free   Phone: (567) 350-9598 Email: contactus@[employer] Website: https://www.myhomemove/component/mymaplocations/cybgeqr-zgcwpfvdkdk-ekynog     7  [employer] [facility] - Rural [employer] - Free or Low-cost Transportation Distance: 18.22 miles      In-Person   [address] [place] [address]  Language: English  Hours: Mon - Fri 8:00 AM - 4:30 PM  Fees: Free   Phone: (538) 491-4706 Website: https://www.[employer]/partner/wknjhwvvl-wfihkznv-qufavggujmi-[place]-grand-[facility]          Important Numbers & Websites       Emergency Services   911  [facility] Services   311  Poison Control   (289) 387-2254  Suicide Prevention Lifeline   (463) 917-1475 (TALK)  Child Abuse Hotline   (800) 130-9475 (4-A-Child)  Sexual Assault Hotline   (726) 786-9017 (HOPE)  National Runaway Safeline   (460) 260-2250 (RUNAWAY)  All-Options Talkline   (464) 287-6232  Substance Abuse Referral   (171) 646-2987 (HELP)

## 2024-02-07 NOTE — PROGRESS NOTES
Assessment & Plan     (F03.911) Dementia with agitation, unspecified dementia severity, unspecified dementia type (H)  (primary encounter diagnosis)  Comment: Daughter Selene reports that her mom has her days and nights mixed up since coming home from the hospital. Bianca has been getting combative. Will start Seroquel at HS. Will have Ativan on hand to use as needed. She has a chair alarm that is on her at all times when warranted    Plan: LORazepam (ATIVAN) 0.5 MG tablet, DISCONTINUED:        QUEtiapine (SEROQUEL) 25 MG tablet            (Z09) Hospital discharge follow-up  Comment: has been doing ok since being home. Home care coming into the home. Urine has been clear   Plan: Continue to try to prevent falls. Medicate with Seroquel at night to help with sleeping and agitation       MED REC REQUIRED  Post Medication Reconciliation Status:  Discharge medications reconciled and changed, see notes/orders    See Patient Instructions    Return if symptoms worsen or fail to improve.    Subjective   Bianca is a 89 year old, presenting for the following health issues:  Hospital F/U        2/7/2024     2:36 PM   Additional Questions   Roomed by Arturo Jackson   Accompanied by Daughter         2/7/2024     2:36 PM   Patient Reported Additional Medications   Patient reports taking the following new medications None     [facility]         2/2/2024    10:58 AM   Post Discharge Outreach   Admission Date 1/26/2024   Reason for Admission Urinary tract infection in elderly patient  Active Problems:    Delirium    Anemia, iron deficiency   Discharge Date 2/1/2024   Discharge Diagnosis Principal Problem:    Urinary tract infection in elderly patient  Active Problems:    Delirium    Anemia, iron deficiency   How are you doing now that you are home? Spoke with son Jamel, who reports patient is doing well since being home. She is tired, but otherwise appears at her baseline. Has all of her current medications and has no questions  regarding discharge summary. Has been in contact with [facility], who will be out for their initial intake assessment on Monday 2/5. Has a hospital follow up appointment scheduled with PCP for 2/7 and they do plan to attend this visit. Encouraged to call with any questions that may arise. No further outreach will be made by this RN CC at this time.   How are your symptoms? (Red Flag symptoms escalate to triage hotline per guidelines) Improved   Do you feel your condition is stable enough to be safe at home until your provider visit? Yes   Does the patient have their discharge instructions?  Yes   Does the patient have questions regarding their discharge instructions?  Yes (see comment)   Were you started on any new medications or were there changes to any of your previous medications?  Yes   Does the patient have all of their medications? Yes   Do you have questions regarding any of your medications?  No   Do you have all of your needed medical supplies or equipment (DME)?  (i.e. oxygen tank, CPAP, cane, etc.) Yes   Discharge follow-up appointment scheduled within 14 calendar days?  Yes   Discharge Follow Up Appointment Date 2/7/2024   Discharge Follow Up Appointment Scheduled with? Primary Care Provider     Hospital Follow-up Visit:    Hospital/Nursing Home/IP Rehab Facility: [facility]  Date of Admission: 1/26/2024  Date of Discharge: 2/1/2024  Reason(s) for Admission: UTI, Delirium and anemia     Was your hospitalization related to COVID-19? No   Problems taking medications regularly:  None  Medication changes since discharge: None  Problems adhering to non-medication therapy:  None    Summary of hospitalization:  [facility] discharge summary reviewed  Diagnostic Tests/Treatments reviewed.  Follow up needed: none  Other Healthcare Providers Involved in Patient s Care:           Update since discharge: Worsened. Coughing lots with shortness of breathe.         Plan of care  communicated with patient and family       Review of Systems  Constitutional, HEENT, cardiovascular, pulmonary, GI, , musculoskeletal, neuro, skin, endocrine and psych systems are negative, except as otherwise noted.      Objective    /86 (BP Location: Right arm, Patient Position: Sitting, Cuff Size: Adult Regular)   Pulse 75   Temp 97.9  F (36.6  C) (Tympanic)   SpO2 97%   There is no height or weight on file to calculate BMI.  Physical Exam   GENERAL: alert and no distress  EYES: Eyes grossly normal to inspection, PERRL and conjunctivae and sclerae normal  NECK: no adenopathy, no asymmetry, masses, or scars  RESP: lungs clear to auscultation - no rales, rhonchi or wheezes  CV: regular rate and rhythm, normal S1 S2, no S3 or S4, no murmur, click or rub, no peripheral edema  MS: no gross musculoskeletal defects noted, no edema  SKIN: no suspicious lesions or rashes. +fading of bruise to left side of face near eye   PSYCH: Confused. Fatigue and frail appearing       Signed Electronically by: SANDRA Reza CNP

## 2024-02-09 NOTE — TELEPHONE ENCOUNTER
Symptom or reason needing to speak to RN: verbal orders  Dirk Ogden     Best number to return call: [phone]     Best time to return call: anytime

## 2024-02-21 NOTE — TELEPHONE ENCOUNTER
11:29 AM    Reason for Call: Phone Call    Description: aveana/concerns/no bowel movement in 2 wks/having inocencia colored urine    Was an appointment offered for this call? No  If yes : Appointment type              Date    Preferred method for responding to this message: Telephone Call  What is your phone number ?[place]/[phone]    If we cannot reach you directly, may we leave a detailed response at the number you provided? Yes    Can this message wait until your PCP/provider returns, if available today?n/a    Lawanda Dominguez

## 2024-02-21 NOTE — TELEPHONE ENCOUNTER
They can drop a UA off to check for UTI. They can try a fleets enema and/or a half bottle of Mag Citrate. If not results, she will need to be evaluated. Can you see how her appetite has been as well? Is she eating?

## 2024-02-22 NOTE — TELEPHONE ENCOUNTER
11:54 AM    Reason for Call: Phone Call    Description: Edel calling from [facility] for wound care orders/stage 1 pressure sore    Was an appointment offered for this call? No  If yes : Appointment type              Date    Preferred method for responding to this message: Telephone Call  What is your phone number ?[phone]/Edel    If we cannot reach you directly, may we leave a detailed response at the number you provided? Yes    Can this message wait until your PCP/provider returns, if available today? Not applicable    Lawanda Dominguez

## 2024-02-22 NOTE — TELEPHONE ENCOUNTER
I called and gave verbal orders for the information listed below and to call back with any questions.

## 2024-03-06 NOTE — TELEPHONE ENCOUNTER
11:46 AM    Reason for Call: Phone Call    Description: bong from [facility] called/has not had a bowel movent in 5 days/wondering of any suggestions/can they use suppository or get an rx for one    Was an appointment offered for this call? No  If yes : Appointment type              Date    Preferred method for responding to this message: Telephone Call  What is your phone number ?[phone]    If we cannot reach you directly, may we leave a detailed response at the number you provided? Yes    Can this message wait until your PCP/provider returns, if available today? Not applicable    Lawanda Dominguez

## 2024-03-07 NOTE — TELEPHONE ENCOUNTER
I called Gena back and gave her the orders listed below per Mell Burrell CNP. She verbalized understanding.

## 2024-03-19 PROBLEM — F03.90 DEMENTIA WITHOUT BEHAVIORAL DISTURBANCE (H): Chronic | Status: ACTIVE | Noted: 2022-05-02

## 2024-03-19 PROBLEM — I48.20 CHRONIC ATRIAL FIBRILLATION (H): Status: ACTIVE | Noted: 2024-01-01

## 2024-03-19 PROBLEM — R31.9 URINARY TRACT INFECTION WITH HEMATURIA, SITE UNSPECIFIED: Status: ACTIVE | Noted: 2024-01-01

## 2024-03-19 PROBLEM — G93.41 SEPTIC ENCEPHALOPATHY: Status: ACTIVE | Noted: 2024-01-01

## 2024-03-19 PROBLEM — R40.1 STUPOR: Status: ACTIVE | Noted: 2024-01-01

## 2024-03-19 PROBLEM — N39.0 URINARY TRACT INFECTION WITH HEMATURIA, SITE UNSPECIFIED: Status: ACTIVE | Noted: 2024-01-01

## 2024-03-19 NOTE — PROGRESS NOTES
She started to come around and was getting a bit agitated.  Still unclear if she is moving her left side is much as the right but any rate not redirectable at all.  Will try a little bit of IV Ativan which she uses at home.

## 2024-03-19 NOTE — PLAN OF CARE
Reason for admission:septic encephalopathy   Pt is confused and disoriented.  Currently strict bed rest. Video monitor for safety, wheels locked, ID band on. rFlacc 7 gave rectal tylenol,on reassessment rFlacc 8 MD ordered IV ativan d/t pt appeared more agitated and tried to crawl out of bed and pulled out IV while family at bedside. New IV placed.  Pt tolerating well after IV ativan, appears to be resting, no longer pulling at IV, no longer attempting to crawl out of bed. When pt got to floor was on 2L NC sat's at 100% was able to wean of to RA sats currently 96-96% on RA.  IV NS at 75ml/hr.  Incontinent of bladder, redness and minor excoriation to lisa area, thick barrier cream applied. Mepilex to back and sacrum coccyx for blanchable redness. Skin dry flaky, applied lotion all over whole body    /88 (BP Location: Other (Comment), Patient Position: Semi-Esteves's, Cuff Size: Adult Small)   Pulse 83   Temp 96.9  F (36.1  C) (Tympanic)   Resp 18   SpO2 98%       Face to face report given with opportunity to observe patient.    Report given to Amy Richardson RN on 3/19/2024 at 7:00 PM

## 2024-03-19 NOTE — ED PROVIDER NOTES
[facility]  ED Provider Note    Chief Complaint   Patient presents with    Altered Mental Status     History:  Bianca Greene is a 89 year old female with history of dementia, hypotension, hip fractures, previous episodes of metabolic encephalopathy related to urinary tract infections presents to the emergency department today sent in by EMS family via EMS with concern for altered mental status.  They state that she was for otherwise normal self until this morning around 930 when they noticed that she was having difficulty communicating verbally, was restless and altered.  They state she had previous near identical presentation with urinary tract infection.  No complaints this time per family patient unable to provide    Review of Systems   Performed; see HPI for pertinent positives and negatives.     Medical history, surgical history, and social history was reviewed.  Nursing documentation, triage note, and vitals were reviewed.    Vitals:  BP: (!) 187/95  Pulse: 90  Temp: 97.6  F (36.4  C)  Resp: 18  SpO2: 97 %    Physical Exam:  Constitutional: Ill-appearing  HENT: NCAT   Eyes: Normal pupils   Neck: supple   CV: No pallor  Pulmonary/Chest: Non-labored respirations clear to auscultation bilaterally  Abdominal: non-distended   MSK: CHEEMA.   Neuro: Responsive to painful stimuli  Skin: Warm and dry. No diaphoresis. No rashes on exposed skin     MDM:      ED Course as of 03/19/24 1252   Tue Mar 19, 2024   1246 89-year-old female here in the emergency department with altered mental status.  Broad initial differential most likely metabolic.  No focal neurological deficits noted.  Very clear urinary tract infection present.  Antibiotics given.  Head CT negative.  Chest x-ray unremarkable.  Remainder of the labs largely unremarkable.  She does look clinically dry despite her elevated BNP.  1 L of fluids given.  Vitals remained stable.  Case discussed with Dr. Alaniz who has accepted the admission        Procedures:  Procedures    Impression:  Final diagnoses:   Stupor   Urinary tract infection with hematuria, site unspecified   Septic encephalopathy           Jose Alberts MD  03/19/24 [id number]       Jose Alberts MD  03/19/24 [id number]

## 2024-03-19 NOTE — ED NOTES
Eyes are crusted shut. Warm wash cloth used and cleaned eyes.  Patient propped on pillow. Sponge in mouth to moisten.

## 2024-03-19 NOTE — MEDICATION SCRIBE - ADMISSION MEDICATION HISTORY
Medication Scribe Admission Medication History    Admission medication history is complete. The information provided in this note is only as accurate as the sources available at the time of the update.    Information Source(s): Family member and CareEverywhere/SureScripts via phone    Pertinent Information:   Patient's daughter (Selene) manages medications and is a good historian.    Changes made to PTA medication list:  Added: None  Deleted: None  Changed:   Lorazepam- takes 1 tablet every night at HS to combat sundowning  Bisacodyl- family reports has not started PTA, was waiting for a nurse to show them how to administer    Allergies reviewed with patient and updates made in EHR: yes    Medication History Completed By: Katalina Shah 3/19/2024 2:31 PM    PTA Med List   Medication Sig Last Dose    ammonium lactate (AMLACTIN) 12 % external cream Apply topically every morning -applies to both feet for dry skin. 3/18/2024 at AM    aspirin 81 MG EC tablet Take 81 mg by mouth daily as needed for pain Past Month at PRN    LORazepam (ATIVAN) 0.5 MG tablet Take 1 tablet (0.5 mg) by mouth every 6 hours as needed for anxiety (Patient taking differently: Take 0.5 mg by mouth at bedtime -sundowning.) 3/18/2024 at 1900    metoprolol tartrate (LOPRESSOR) 25 MG tablet Take 1 tablet (25 mg) by mouth every 12 hours 3/18/2024 at 1700    oxyCODONE-acetaminophen (PERCOCET) 5-325 MG tablet Take 0.5 tablets by mouth 2 times daily as needed for pain More than a month at 1/2 tablet    QUEtiapine (SEROQUEL) 25 MG tablet Take 1 tablet (25 mg) by mouth at bedtime 3/18/2024 at 1900

## 2024-03-19 NOTE — PROGRESS NOTES
Assessment completed by visit with sonJamel, in patient's room.     LOC: asleep, would moan out off an on during conversation     Dx: UTI  Chronic Disease Management: HTN, dementia     Lives with: Jamel redd   Living at:  Jamel's home  Transportation: YES Agency      Primary PCP: Mell Burrell  Insurance:  Medicare/Medicaid      Support System:  family  [facility]  /County Services: Not currently    Health Care Directive: yes- Vernell and Jamel  Guardian: no  POA: Vernell     Pharmacy: [facility]   Meds management: family manages      Adequate Resources for needs (housing, utilities, food/med): YES  Household chores: family manages   Work/community/social activity: NO limited by hip hardware      ADLs: receives assistance  Ambulation:pivots only   Falls: yes   Nutrition: no concerns   Sleep: no concerns      Equipment used: wheelchair, shower chair, grab bars                                     Mental health: dementia   Substance abuse: no  Exposure to violence/abuse: no  Stressors: no concerns      Able to Return to Prior Living Arrangements: YES     Barriers:None noted at this time.       MIRTHA: medium     Plan: return to Jamel's home via agency. Jamel reports that patient has been doing better since Alin has started services and they hope to continue services as currently receiving them.

## 2024-03-19 NOTE — PLAN OF CARE
Notified MD of admission BP. No new orders at this time.    Nichelle Richardson RN on 3/19/2024 at 3:32 PM

## 2024-03-19 NOTE — PHARMACY-MEDICATION REGIMEN REVIEW
Pharmacy Antimicrobial Stewardship Assessment     Current Antimicrobial Therapy:  Anti-infectives (From now, onward)      Start     Dose/Rate Route Frequency Ordered Stop    24 1200  cefTRIAXone in d5w (ROCEPHIN) intermittent infusion 1 g         1 g  over 30 Minutes Intravenous EVERY 24 HOURS 24 1357     Indication: UTI    Days of Therapy: 1     Pertinent Labs:  Recent Labs   Lab Test 24  1106   WBC 4.3     Recent Labs   Lab Test 24  1156   LACT 1.1   PCAL 0.04    < > = values in this interval not displayed.      Temperature:  Temp (24hrs), Av.6  F (36.4  C), Min:97.6  F (36.4  C), Max:97.6  F (36.4  C)    Culture Results:   30-Day Micro Results       Collected Updated Procedure Result Status      2024 1207 2024 1210 Blood Culture Peripheral Blood [[id number]]   Peripheral Blood    In process Component Value   No component results               2024 1156 2024 1210 Blood Culture Peripheral Blood [[id number]]   Peripheral Blood    In process Component Value   No component results               2024 1059 2024 1105 Urine Culture Aerobic Bacterial [[id number]]   Urine, Straight Catheter    In process Component Value   No component results               2024 1058 2024 1145 Asymptomatic Influenza A/B, RSV, & SARS-CoV2 PCR (COVID-19) Nasopharyngeal [[id number]]    Swab from Nasopharyngeal    Final result Component Value   Influenza A PCR Negative   Influenza B PCR Negative   RSV PCR Negative   SARS CoV2 PCR Negative   NEGATIVE: SARS-CoV-2 (COVID-19) RNA not detected, presumed negative.                Recent Antibiotics:   Antibiotics she received while admitted here recently in January:      Recommendations/Interventions:  None at this time    Jacquelin Shah, [facility]  2024

## 2024-03-19 NOTE — ED TRIAGE NOTES
Pt brought in by EMS for evaluation of altered mental status. Pt has appt with PT this am ans was noted to have altered mentation. Pt is normally able to communicate verbally without difficulty. Pt restless and unable to speak on arrival. PIV in place from EMS. Provider was in to evaluate neuro status on arrival. LKW was 2100 last night.

## 2024-03-19 NOTE — PLAN OF CARE
[facility] Inpatient Admission Note:    Patient admitted to 3112/3112-1 at approximately 1330 via bed accompanied by son and daughter from emergency room . Report received from Myla in SBAR format at 1326 via telephone. Patient transferred to bed via slide board.. Patient is alert and oriented X 3, NYDIA pain;   Patient oriented to room, unit, hourly rounding, and plan of care. Explained admission packet and patient handbook with patient bill of rights brochure. Will continue to monitor and document as needed.     Inpatient Nursing criteria listed below was met:    Health care directives status obtained and documented: Yes    Patient identifies a surrogate decision maker:  POA daughter at bedside with patient      Clergy visit ordered if patient requests: N/A    Skin issues/needs documented: Yes    Isolation Patient: no     Fall Prevention Yes: Care plan updated, education given and documented, sticker and magnet in place: Yes    Care Plan initiated: Yes    Education Documented (including assessment): Yes    Patient has discharge needs : No

## 2024-03-19 NOTE — H&P
[facility]    History and Physical - Hospitalist Service       Date of Admission:  3/19/2024    Assessment & Plan      Bianca Greene is a 89 year old female admitted on 3/19/2024.     1.  Decreased level of consciousness/altered mental status: Patient baseline does have fairly significant dementia.  Does have some speech.  However since this morning she has not been verbal to family.  Minimally responsive interactive.  CT scan of her head shows no obvious acute abnormalities.  Family states she was her normal self last evening.  She is hemodynamically stable here currently.  Oxygenation is good.  No real fevers.  She does have evidence of a acute cystitis.  Was treated with IV Rocephin.  She is markedly more withdrawn and on interactive per family.  This either is just some septic encephalopathy from her acute cystitis however she has no other signs of this.  No white count no fever procalcitonin normal.  The other big concern I have is perhaps a stroke.  Does have persistent A-fib is on no anticoagulation.  CT shows no obvious acute abnormalities currently.  She may have some left-sided weakness compared to right dose of very difficult exam given her current mental status.  She is DNR/DNI.  I do not think she would tolerate an MRI scan.  At this point what we will do is treat her cystitis give her some gentle IV fluids she definitely is on the dry side of things follow neuroexam and consider repeat CT scan..  If this is due to some mild dehydration and acute cystitis should get better with the next day or so.    2.  Acute cystitis: Cultures have been ordered blood cultures done and she is getting IV Rocephin.  In the past she has had Aerococcus and E. coli resistant to quinolones.    3.  Atrial fibrillation: She has had this apparently for the last year perhaps is on no r anticoagulation given her age.  Her rate is adequate.  She had been on Lopressor 25 mg twice daily.  Did not take her  medications today.  Certainly if her heart rate starts to increase we can use some IV metoprolol.  Last echocardiogram was done not too long ago did show the moderate of stenosis but however normal systolic function.    4.  Volume status: The patient does look a little volume depleted.  Did get fluid in the ER will continue with some IV fluid here as she will be n.p.o. given her decreased level of consciousness.  And altered mental status.    5.  GI: Patient will be n.p.o. given her altered mental state.  At home when they do feed her she gets puréed and thickened liquids still.    6.  Pulmonary status: Lungs are clear on examination chest x-ray was clear.  She was empirically put on oxygen when she arrived her sats are 100%.  Will try her off oxygen.    7.  Disposition: Family states that if she is able they would definitely take her back home if she gets better.  We did discuss that certainly given her age and current status things may not improve.  We will support with IV fluids treat her with antibiotics..  If things worsen or she has evidence of significant stroke then we will rediscuss our current treatment plan.      Diet:  N.p.o.  DVT Prophylaxis: Pneumatic Compression Devices  Reed Catheter: Not present  Lines: None     Cardiac Monitoring: None  Code Status:  DNR/DNI    Clinically Significant Risk Factors Present on Admission                # Drug Induced Platelet Defect: home medication list includes an antiplatelet medication   # Hypertension: Noted on problem list   # Dementia: noted on problem list               Disposition Plan      Expected Discharge Date: 03/21/2024                  Rafal Alaniz MD  Hospitalist Service  [facility]  Securely message with [employer] (more info)  Text page via "[employer] Paging/Directory     ______________________________________________________________________    Chief Complaint   Decreased level of consciousness    History is obtained from the electronic  health record, emergency department physician, and patient's family    History of Present Illness   Bianca Greene is a 89 year old female who has a significant history of dementia who lives with her family.  Apparently this morning she was definitely less responsive and interactive with them.  Her baseline is that she is able to eat and drink with help from the family.  She can pivot and stand.  But otherwise is more awake and able to interact.  Apparently was doing okay yesterday and just this morning was they found her.  No history of any trauma at all.  She has not had any bleeding at all.  She is show no evidence of any respiratory distress.  Did not take any medications morning she is unable to really eat and drink.  When they do feed her is usually more puréed diet and thickened liquids.  Upon arrival to the ER blood pressure was 180/95, heart rate was in the 80-90 range atrial fibrillation, she was 97% on 3 L of O2.  She does not wear O2 at home.  EMS placed this on her.  She is afebrile.  Workup in the ER showed a CT scan of her head showing nothing acute.  Chest x-ray showed no obvious acute abnormalities.  Urinalysis was consistent with acute cystitis.  Cultures were drawn.  She was given 1 g of IV Rocephin.  Blood sugar was 101.  Procalcitonin 0.04 troponin 29 BNP was 2294.  Electrolytes were within normal limits creatinine was 1.07.  Hemoglobin is 13.5 white count 4300.    Saw the patient in the ER.  She was awake.  No real purposeful interaction with either myself or her daughter.  Not following commands.  Does not appear to be any obvious distress.  She is moving her right upper extremity definitely more than her left.  She is right-handed..  Has had baseline she is able to stand and pivot does not walk at all.  Daughter and son state that this is the worst that they have ever seen her in terms of her ability to interact.    Past Medical History    Past Medical History:   Diagnosis Date     Abdominal pain, left lower quadrant 5/14/2002    Chest pain, unspecified 6/26/2007    Other screening mammogram 5/22/2002    Venous stasis ulcer (H)    Chronic atrial fibrillation on no anticoagulation  Dementia  Aortic stenosis moderate in nature peak velocity 3.61 m/s mean gradient of 28 mmHg.  Ejection fraction normal 60%.    Past Surgical History   Past Surgical History:   Procedure Laterality Date    EYE SURGERY  2010    Bilateral cataracts    HYSTERECTOMY      left hip replacement  2009    RIGHT hip replacement per pt    TONSILLECTOMY         Prior to Admission Medications   Prior to Admission Medications   Prescriptions Last Dose Informant Patient Reported? Taking?   LORazepam (ATIVAN) 0.5 MG tablet   No No   Sig: Take 1 tablet (0.5 mg) by mouth every 6 hours as needed for anxiety   QUEtiapine (SEROQUEL) 25 MG tablet   No No   Sig: Take 1 tablet (25 mg) by mouth at bedtime   ammonium lactate (AMLACTIN) 12 % external cream   Yes No   Sig: Apply topically 2 times daily as needed for dry skin   aspirin 81 MG EC tablet   Yes No   Sig: Take 81 mg by mouth daily as needed for pain   Patient not taking: Reported on 2/7/2024   bisacodyl (DULCOLAX) 10 MG suppository   No No   Sig: Place 1 suppository (10 mg) rectally daily as needed for constipation   metoprolol tartrate (LOPRESSOR) 25 MG tablet   No No   Sig: Take 1 tablet (25 mg) by mouth every 12 hours   oxyCODONE-acetaminophen (PERCOCET) 5-325 MG tablet   Yes No   Sig: Take 0.5 tablets by mouth 2 times daily as needed for pain   Patient not taking: Reported on 2/7/2024      Facility-Administered Medications: None        Review of Systems    The 10 point Review of Systems is negative other than noted in the HPI or here.      Social History   I have reviewed this patient's social history and updated it with pertinent information if needed.  Social History     Tobacco Use    Smoking status: Never     Passive exposure: Current    Smokeless tobacco: Never   Vaping Use     Vaping Use: Never used   Substance Use Topics    Alcohol use: No    Drug use: No         Family History   I have reviewed this patient's family history and updated it with pertinent information if needed.  Family History   Problem Relation Age of Onset    Heart Disease Father         heart disease; cause of death    Dementia Mother 89        cause of death    Osteoporosis Mother     Cancer - colorectal Sister     Cancer Son 54        brain    Cancer Sister 62        leukemia; cause of death    Cerebrovascular Disease Son     Suicide Brother          Allergies   No Known Allergies     Physical Exam   Vital Signs: Temp: 97.6  F (36.4  C) Temp src: Tympanic BP: 140/92 Pulse: 75   Resp: (!) 10 SpO2: 100 % O2 Device: Nasal cannula Oxygen Delivery: 3 LPM  Weight: 0 lbs 0 oz    Constitutional: Patient is awake no purposeful interaction does look around the room.  Does not appear to be any obvious distress  Eyes: Both eyes are little bit irritated with some conjunctivitis.  Sclera clear pupils are somewhat irregular minimally reactive  ENT: Mouth is extremely dry.  Face does appear to be symmetric  Respiratory: No increased work of breathing, good air exchange, clear to auscultation bilaterally, no crackles or wheezing  Cardiovascular: Irregularly irregular rhythm consistent with atrial fibrillation.  She has a grade 2 out of 6 to 3 out of 6 systolic murmur noted left sternal border.  Pulses are 2+ equal.  Neck veins appear to be flat  GI: No scars, normal bowel sounds, soft, non-distended, non-tender, no masses palpated, no hepatosplenomegally  Skin: Skin with some scattered bruising.  Musculoskeletal: There is no redness, warmth, or swelling of the joints.  Full range of motion noted.  Motor strength is 5 out of 5 all extremities bilaterally.  Tone is normal.  Neurologic: Face appears to be symmetric.  Extraocular movements are difficult to really assess.  There is no deviation noted.  Is moving her right arm.  Tone is  slightly increased.  Left arm tone seems to be greater than left.  There is some decreased movement of that left arm compared to right.  Although difficult given her mental status.  Lower extremities does have some withdrawal on the right leg compared to the left.  Increased tone bilaterally.    Medical Decision Making       90 MINUTES SPENT BY ME on the date of service doing chart review, history, exam, documentation & further activities per the note.      Data     I have personally reviewed the following data over the past 24 hrs:    4.3  \   13.5   / 230     136 103 17.4 /  101 (H)   4.2 24 1.07 (H) \     ALT: 11 AST: 21 AP: 129 TBILI: 0.4   ALB: 3.6 TOT PROTEIN: 7.3 LIPASE: N/A     Trop: 29 (H) BNP: 2,294 (H)     TSH: 1.71 T4: N/A A1C: N/A     Procal: 0.04 CRP: N/A Lactic Acid: 1.1         Imaging results reviewed over the past 24 hrs:   Recent Results (from the past 24 hour(s))   CT Head w/o Contrast    Narrative    PROCEDURE: CT HEAD W/O CONTRAST     HISTORY: ams.    COMPARISON: None.    TECHNIQUE:  Helical images of the head from the foramen magnum to the  vertex were obtained without contrast.    FINDINGS: The ventricular system and cortical sulci are normal for  age. There is white matter low density in both hemispheres consistent  with small vessel changes. There are no masses or ventricular shifts  or extracerebral collections. Brainstem and cerebellum are normal. The  cranial vault is intact. The paranasal sinuses are clear.      Impression    IMPRESSION: No acute brain abnormality      ELSA MCQUEEN MD         SYSTEM ID:  [id number]   XR Chest Port 1 View    Narrative    Procedure:XR CHEST PORT 1 VIEW    Clinical history:Female, 89 years, AMS    Technique: Single view was obtained.    Comparison: 1/29/2024    Findings: The cardiac silhouette is within normal limits.. The  pulmonary vasculature is within normal limits.    The lungs are clear. Bony structures again demonstrate a number of  healed  fractures involving the left ribs      Impression    Impression:   No acute abnormality. The lungs appear to be clear.    МАРИЯ ESCALANTE MD         SYSTEM ID:  [id number]

## 2024-03-20 NOTE — PROGRESS NOTES
CLINICAL NUTRITION SERVICES  -  ASSESSMENT NOTE    REASON FOR ASSESSMENT:  Admission Nutrition Risk Screen - 14-23lb weight loss, reduced appetite/intake    NUTRITION HISTORY  Bianca Greene is a 89 year old female admitted for septic encephalopathy. Medical hx includes chronic a fib, dementia. Pureed diet and thickened liquids at home. Had mildly thick liquids during admission last month. Weight is down 9lbs in the last 1.5 months with reduced intake. Currently NPO due to AMS, somnolence.    CURRENT NUTRITION ORDERS  Diet Order:   Orders Placed This Encounter      NPO for Medical/Clinical Reasons Except for: Ice Chips  Current Intake/Tolerance: NPO    ANTHROPOMETRICS  Height: Data Unavailable  Weight: 122 lbs 2.16 oz  Body mass index is 21.64 kg/m .  Weight Status:  Normal BMI  Weight History: 6.8% weight loss in 1.5 months  Wt Readings from Last 10 Encounters:   03/20/24 55.4 kg (122 lb 2.2 oz)   02/01/24 59.7 kg (131 lb 9.8 oz)   06/20/23 59.9 kg (132 lb)   07/20/21 66.7 kg (147 lb)   06/02/20 62.6 kg (138 lb)   11/08/19 65.6 kg (144 lb 9.6 oz)   11/01/19 65.8 kg (145 lb 1 oz)   04/19/16 68.9 kg (152 lb)   09/17/15 63.5 kg (140 lb)   08/11/15 65.8 kg (145 lb)        ASSESSED NUTRITION NEEDS: 55.4kg  Estimated Energy Needs: 4488-6669 kcals (25-30 Kcal/Kg)  Estimated Protein Needs: 55-85 grams protein (1-1.5 g pro/Kg)    MALNUTRITION:  % Weight Loss:  > 5% in 1 month (severe malnutrition)  % Intake:  </= 75% for >/= 1 month (moderate malnutrition)    Malnutrition Diagnosis: Moderate malnutrition  In Context of:  Acute illness or injury    NUTRITION INTERVENTIONS  Recommendations / Nutrition Prescription  Advance diet as appropriate  Suggest Ensure supplements once diet advances    MONITORING AND EVALUATION:  Diet order, Intake, weight, labs

## 2024-03-20 NOTE — PROGRESS NOTES
BIANCA CURIEL Visit with family.  Bianca is [employer] affiliated with [facility] in [place].  The  had not been contacted but will be following this visit.  Closed time in prayer with the family.

## 2024-03-20 NOTE — PLAN OF CARE
Reason for hospital stay:  Septic encephalopathy  Living situation PTA: Home with son  Most recent vitals: /85 (BP Location: Right arm, Patient Position: Semi-Esteves's, Cuff Size: Adult Regular)   Pulse 86   Temp 97.6  F (36.4  C) (Tympanic)   Resp 16   Wt 55.4 kg (122 lb 2.2 oz)   SpO2 98%   BMI 21.64 kg/m      Pain Management:  rFlacc and PainAD scales 0 overnight.   LOC:  Somnolent to obtunded overnight. Unable to assess orientation as patient is unable to respond to assessment questions.   Cardiac:  Apical pulse irregular  Respiratory:  Maintaining sats on room air. Lung sounds clear but diminished throughout.   GI:  Bowel sounds audible and normoactive   :  Incontinent  Skin Issues:  Scattered bruising and scabs. Blanchable redness to buttocks/coccyx. Excoriation to perineum, buttocks, coccyx - cream applied and foam dressing in place.     IVF:  NS at 75 mL/hr  ABX:  IV Rocephin     Nutrition: NPO  ADL's:  Dependent assist of 2  Ambulation: Bedrest   Safety:  Video monitoring, alarms active and audible, call light within reach, lighting adjusted, bed in lowest position, wheels locked.     Comments:  Unable to complete neuro checks q4 hr as patient is unable to follow commands for assessment. Patient has been nonverbal overnight.     Patient was found to be restless around 2330 last night, administered PRN ativan for anxiety. Upon reassessment, patient was resting comfortably.     Face to face report given with opportunity to observe patient.    Report given to GIANNA Moya RN   3/20/2024  7:11 AM

## 2024-03-20 NOTE — PLAN OF CARE
Pt is confused and disoriented. Aroused to voice.  Currently strict bed rest. Video monitor for safety, wheels locked, ID band on. rFlacc 0. RA sats currently 96% on RA.  IV NS at 75ml/hr. Iv rocephin given today. Incontinent of bladder, redness and minor excoriation to lisa area, thick barrier cream applied. Mepilex to back and sacrum coccyx for blanchable redness. Family at bedside most of day, family repo-ing patient throughout day.     /83 (BP Location: Right arm, Patient Position: Semi-Esteves's, Cuff Size: Adult Regular)   Pulse 83   Temp 98.7  F (37.1  C) (Tympanic)   Resp 16   Wt 55.4 kg (122 lb 2.2 oz)   SpO2 96%   BMI 21.64 kg/m        Face to face report given with opportunity to observe patient.    Report given to Jeanette Richardson RN on 3/20/2024 at 7:35 PM

## 2024-03-20 NOTE — PHARMACY-MEDICATION REGIMEN REVIEW
Pharmacy Antimicrobial Stewardship Assessment     Current Antimicrobial Therapy:  Anti-infectives (From now, onward)      Start     Dose/Rate Route Frequency Ordered Stop    24 1200  cefTRIAXone in d5w (ROCEPHIN) intermittent infusion 1 g         1 g  over 30 Minutes Intravenous EVERY 24 HOURS 24 1357     Indication: UTI    Days of Therapy: 2     Pertinent Labs:  Recent Labs   Lab Test 24  0512 24  1106   WBC 4.9 4.3     Recent Labs   Lab Test 24  1156   LACT 1.1   PCAL 0.04    < > = values in this interval not displayed.      Temperature:  Temp (24hrs), Av  F (36.1  C), Min:96.8  F (36  C), Max:97.6  F (36.4  C)    Culture Results:   30-Day Micro Results       Collected Updated Procedure Result Status      2024 1207 2024 0717 Blood Culture Peripheral Blood [[id number]]   Peripheral Blood    Preliminary result Component Value   Culture No growth after 12 hours  [P]                2024 1156 2024 0717 Blood Culture Peripheral Blood [[id number]]   Peripheral Blood    Preliminary result Component Value   Culture No growth after 12 hours  [P]                2024 1059 2024 0707 Urine Culture Aerobic Bacterial [[id number]]   Urine, Straight Catheter    Preliminary result Component Value   Culture No growth, less than 1 day  [P]                2024 1058 2024 1145 Asymptomatic Influenza A/B, RSV, & SARS-CoV2 PCR (COVID-19) Nasopharyngeal [[id number]]    Swab from Nasopharyngeal    Final result Component Value   Influenza A PCR Negative   Influenza B PCR Negative   RSV PCR Negative   SARS CoV2 PCR Negative   NEGATIVE: SARS-CoV-2 (COVID-19) RNA not detected, presumed negative.              Recent Antibiotics:  Antibiotics she received while admitted here recently in January:      Recommendations/Interventions:  None at this time    Jacquelin Shah, [facility]  2024

## 2024-03-20 NOTE — PROGRESS NOTES
[facility]    Hospitalist Progress Note    Date of Service (when I saw the patient): 03/20/2024    Assessment & Plan   Bianca Greene is a 89 year old female who was admitted on 3/19/2024.    Decreased level of consciousness/altered mental status: Patient does have fairly significant dementia at baseline, some speech with interaction with family.  Encephalopathy due to cystitis versus possible CVA.  Initial CT scan head negative.  No improvement overnight.  Did require sedation for agitation.  -3/20: Continue treating cystitis.  Will repeat head CT to see if CVA has become apparent.  If negative, consider MRI.    Acute cystitis without hematuria: Continuing empiric Rocephin    Chronic atrial fibrillation: Not on systemic anticoagulation due to age.    FEN: Patient does not awakened for p.o. intake.  Continue IV fluids.  -Replace electrolytes as indicated  -Monitoring renal function and urine output    Clinically Significant Risk Factors Present on Admission                # Drug Induced Platelet Defect: home medication list includes an antiplatelet medication   # Hypertension: Noted on problem list   # Dementia: noted on problem list                 DVT Prophylaxis: Pneumatic Compression Devices    Code Status: No CPR- Do NOT Intubate    Disposition: Prognosis is guarded overall.    Chintan Mejia MD, MD        Interval History   Patient seen in room.  Patient resting, not arousable to verbal or physical stimuli.  Did not attempt painful stimuli.  Did require some sedation for agitation late last evening, earlier this morning.  Patient has been asleep since.    -Data reviewed today: I reviewed all new labs and imaging results over the last 24 hours. I personally reviewed imaging reports.    Physical Exam   Temp: 97.6  F (36.4  C) Temp src: Tympanic BP: 165/85 Pulse: 86   Resp: 16 SpO2: 98 % O2 Device: None (Room air) Oxygen Delivery: 1 LPM  Vitals:    03/20/24 0544   Weight: 55.4 kg (122 lb 2.2 oz)  When Quita was here yesterday, she forgot to ask for a referral to an OB-Gyn to see Dr. Haddad in [place] [phone].  To have a pessary for her eystocele.  Per the note she gave me.       Vital Signs with Ranges  Temp:  [96.8  F (36  C)-97.6  F (36.4  C)] 97.6  F (36.4  C)  Pulse:  [75-98] 86  Resp:  [7-21] 16  BP: (133-187)/() 165/85  SpO2:  [97 %-100 %] 98 %    Intake/Output Summary (Last 24 hours) at 3/20/2024 0820  Last data filed at 3/20/2024 0426  Gross per 24 hour   Intake 901 ml   Output --   Net 901 ml       Peripheral IV 03/19/24 Anterior;Left Upper arm (Active)   Site Assessment WDL 03/20/24 0607   Line Status Infusing 03/20/24 0607   Dressing Transparent 03/20/24 0607   Dressing Status clean;dry;intact 03/20/24 0607   Dressing Intervention New dressing  03/19/24 1656   Line Intervention Flushed 03/19/24 1656   Phlebitis Scale 0-->no symptoms 03/20/24 0607   Infiltration? no 03/20/24 0607   Number of days: 1     No line/device    Constitutional -somnolent, not responsive to verbal stimuli  HEENT - atraumatic, normocephalic  Neck - supple, no masses, no JVD  CVS - S1 S2 irregular  Respiratory - CTA b/l  GI - soft, NT, ND, + bowel sounds, no organomegaly  Musculoskeletal - no LE edema, no lesions  Neuro -somnolence, unable to cooperate with neuro assessment      Medications    sodium chloride 75 mL/hr at 03/19/24 1424      cefTRIAXone  1 g Intravenous Q24H    [Held by provider] metoprolol tartrate  25 mg Oral Q12H    sodium chloride (PF)  3 mL Intracatheter Q8H       Data   Recent Labs   Lab 03/20/24  0512 03/19/24  1156 03/19/24  1106 03/19/24  1101   WBC 4.9  --  4.3  --    HGB 10.9*  --  13.5  --    MCV 76*  --  75*  --      --  230  --     136  --   --    POTASSIUM 4.2 4.2  --   --    CHLORIDE 106 103  --   --    CO2 21* 24  --   --    BUN 14.2 17.4  --   --    CR 0.90 1.07*  --   --    ANIONGAP 9 9  --   --    LUKE 9.5 10.1*  --   --    GLC 92 101*  --  101*   ALBUMIN  --  3.6  --   --    PROTTOTAL  --  7.3  --   --    BILITOTAL  --  0.4  --   --    ALKPHOS  --  129  --   --    ALT  --  11  --   --    AST  --  21  --   --      Lactic Acid   Date Value Ref Range  Status   03/19/2024 1.1 0.7 - 2.0 mmol/L Final   01/31/2024 1.9 0.7 - 2.0 mmol/L Final   01/30/2024 1.8 0.7 - 2.0 mmol/L Final   10/30/2019 1.7 0.7 - 2.0 mmol/L Final       Recent Results (from the past 24 hour(s))   CT Head w/o Contrast    Narrative    PROCEDURE: CT HEAD W/O CONTRAST     HISTORY: ams.    COMPARISON: None.    TECHNIQUE:  Helical images of the head from the foramen magnum to the  vertex were obtained without contrast.    FINDINGS: The ventricular system and cortical sulci are normal for  age. There is white matter low density in both hemispheres consistent  with small vessel changes. There are no masses or ventricular shifts  or extracerebral collections. Brainstem and cerebellum are normal. The  cranial vault is intact. The paranasal sinuses are clear.      Impression    IMPRESSION: No acute brain abnormality      ELSA MCQUEEN MD         SYSTEM ID:  [id number]   XR Chest Port 1 View    Narrative    Procedure:XR CHEST PORT 1 VIEW    Clinical history:Female, 89 years, AMS    Technique: Single view was obtained.    Comparison: 1/29/2024    Findings: The cardiac silhouette is within normal limits.. The  pulmonary vasculature is within normal limits.    The lungs are clear. Bony structures again demonstrate a number of  healed fractures involving the left ribs      Impression    Impression:   No acute abnormality. The lungs appear to be clear.    МАРИЯ ESCALANTE MD         SYSTEM ID:  [id number]       Chintan Mejia MD

## 2024-03-21 NOTE — PLAN OF CARE
Report given with opportunity to observe patient.    Report given to Ewa Merrill RN   3/21/2024  3:22 PM

## 2024-03-21 NOTE — PLAN OF CARE
Pt is nonverbal and unable to follow commands at this time PERRLA intact. Neuro assessments hard to gauge d/t inability to follow directions. T/R q2hrs. NS @ 75/hr. Frequent oral cares. Incontinent of bladder this shift. Continues on video monitor for safety. Pt did not seem restless this shift no IV ativan needed. Bed is locked and low, call light within reach, pt makes needs known.          Face to face report given with opportunity to observe patient.    Report given to GIANNA Sharif RN   3/21/2024  7:27 AM

## 2024-03-21 NOTE — PROGRESS NOTES
[facility]    Hospitalist Progress Note    Date of Service (when I saw the patient): 03/21/2024    Assessment & Plan   Bianca Greene is a 89 year old female who was admitted on 3/19/2024.    Decreased level of consciousness/altered mental status: Patient does have fairly significant dementia at baseline, some speech with interaction with family.  Encephalopathy due to cystitis versus possible CVA.  Initial CT scan head negative.  No improvement overnight.  Did require sedation for agitation.  -3/20: Continue treating cystitis.  Will repeat head CT to see if CVA has become apparent.  If negative, consider MRI.  -3/21: Repeat head CT negative.  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  Will speak to family about goals of care    Acute cystitis without hematuria: Continuing empiric Rocephin    Chronic atrial fibrillation: Not on systemic anticoagulation due to age.    FEN: Patient has not awakened for p.o. intake.  Continue IV fluids.  -Replace electrolytes as indicated  -Monitoring renal function and urine output    Clinically Significant Risk Factors                  # Hypertension: Noted on problem list     # Dementia: noted on problem list     # Moderate Malnutrition: based on nutrition assessment, PRESENT ON ADMISSION            DVT Prophylaxis: Pneumatic Compression Devices    Code Status: No CPR- Do NOT Intubate    Disposition: Prognosis is poor overall.    Chintan Mejia MD, MD        Interval History   Patient seen in room.  Patient is awake but does not respond to verbal stimuli, not interactive.  No distress.    -Data reviewed today: I reviewed all new labs and imaging results over the last 24 hours. I personally reviewed imaging reports.    Physical Exam   Temp: 98.4  F (36.9  C) Temp src: Tympanic BP: 144/71 Pulse: 90   Resp: 18 SpO2: 94 % O2 Device: None (Room air)    Vitals:    03/20/24 0544 03/21/24 0612   Weight: 55.4 kg (122 lb  2.2 oz) 54.5 kg (120 lb 2.4 oz)     Vital Signs with Ranges  Temp:  [98.4  F (36.9  C)-98.7  F (37.1  C)] 98.4  F (36.9  C)  Pulse:  [83-94] 90  Resp:  [16-18] 18  BP: (144-164)/(65-83) 144/71  SpO2:  [94 %-96 %] 94 %    No intake or output data in the 24 hours ending 03/21/24 0803    Peripheral IV 03/19/24 Anterior;Left Upper arm (Active)   Site Assessment WDL 03/20/24 1429   Line Status Infusing 03/20/24 1429   Dressing Transparent 03/20/24 1429   Dressing Status clean;dry;intact 03/20/24 1429   Dressing Intervention New dressing  03/19/24 1656   Line Intervention Flushed 03/20/24 0800   Phlebitis Scale 0-->no symptoms 03/20/24 1429   Infiltration? no 03/20/24 1429   Number of days: 2     No line/device    Constitutional -patient is awake, no distress, not responsive to verbal stimuli  HEENT - atraumatic, normocephalic  Neck - supple, no masses, no JVD  CVS - S1 S2 irregular, loud systolic murmur  Respiratory - CTA b/l  GI - soft, NT, ND, + bowel sounds, no organomegaly  Musculoskeletal - no LE edema, no lesions  Neuro - unable to cooperate with neuro assessment      Medications    sodium chloride 75 mL/hr at 03/21/24 0707      cefTRIAXone  1 g Intravenous Q24H    [Held by provider] metoprolol tartrate  25 mg Oral Q12H    sodium chloride (PF)  3 mL Intracatheter Q8H       Data   Recent Labs   Lab 03/21/24  0509 03/20/24  0512 03/19/24  1156 03/19/24  1106   WBC 4.8 4.9  --  4.3   HGB 9.6* 10.9*  --  13.5   MCV 76* 76*  --  75*    236  --  230    136 136  --    POTASSIUM 3.8 4.2 4.2  --    CHLORIDE 109* 106 103  --    CO2 19* 21* 24  --    BUN 12.1 14.2 17.4  --    CR 0.94 0.90 1.07*  --    ANIONGAP 9 9 9  --    LUKE 9.1 9.5 10.1*  --    GLC 71 92 101*  --    ALBUMIN  --   --  3.6  --    PROTTOTAL  --   --  7.3  --    BILITOTAL  --   --  0.4  --    ALKPHOS  --   --  129  --    ALT  --   --  11  --    AST  --   --  21  --      Lactic Acid   Date Value Ref Range Status   03/19/2024 1.1 0.7 - 2.0 mmol/L  Final   01/31/2024 1.9 0.7 - 2.0 mmol/L Final   01/30/2024 1.8 0.7 - 2.0 mmol/L Final   10/30/2019 1.7 0.7 - 2.0 mmol/L Final       Recent Results (from the past 24 hour(s))   CT Head w/o Contrast    Narrative    Exam: CT HEAD W/O CONTRAST    Clinical history:89 years Female suspect CVA    Comparisons: 3/19/2024    Technique: Axial CT imaging of the head was performed Without  intervenous contrast.  This exam was performed using one or more of the following dose  reduction techniques:  Automated exposure control, adjustment of the PACO and/or KV according  to patient's size, and/or use of iterative reconstruction technique.    FINDINGS:   Ventricles and sulci are symmetric. The gray-white matter  differentiation throughout the brain is well maintained. There is  advanced periventricular white matter change of chronic small vessel  ischemic disease. There is no evidence of intracranial mass or  hemorrhage. Visualized portions of the paranasal sinuses and mastoid  air cells are well aerated. There is no evidence of skull fracture.      Impression    IMPRESSION: No acute intracranial findings. No evidence of mass or  hemorrhage. Findings similar to the prior study.    JOSE SWANSON MD         SYSTEM ID:  [id number]   MR Brain w/o Contrast    Narrative    EXAM:  MR BRAIN W/O CONTRAST    HISTORY:  r/o CVA.    TECHNIQUE:  Multiplanar, multisequence MR imaging of the head without  intravenous contrast    COMPARISON:  CT head 3/20/2024, 3/19/2024, 1/29/2024, 1/26/2024     FINDINGS:    Evaluation severely limited secondary to motion.    On series 14/15, there is abnormal diffusion restriction in the  inferior left occipital lobe, consistent with left PCA territory.  There also appears to be abnormal diffusion restriction in the right  pericolonic white matter    There is no mass effect, midline shift, or evidence of acute  intracranial hemorrhage on limited exam. The ventricles are  proportionate to the cerebral sulci.  Multifocal patchy to confluent  foci of T2 hyperintense FLAIR signal in the periventricular and  subcortical white matter, which is nonspecific, likely related to  chronic small vessel ischemic disease given the patient's age.  Moderate generalized volume loss.     Paranasal sinuses and mastoid air cells are grossly unremarkable. The  orbits are grossly unremarkable.      Impression    IMPRESSION:  Evaluation significantly limited secondary to motion.    1.  Suspected acute to subacute left PCA territory infarct.   2.  Suspected acute to subacute small right perithalamic lacunar type  infarct.    MACHELLE CERVANTES MD         SYSTEM ID:  [id number]       Chintan Mejia MD

## 2024-03-21 NOTE — PLAN OF CARE
Pt. Grabbing at her sheets and her hair and appears more agitated in bed. Reached for nurses hand with a little smile. Still unable to follow commands. IV ativan given for restlessness per order. Safety measures in place. Will continue to assess.

## 2024-03-21 NOTE — PHARMACY
Pharmacy Antimicrobial Stewardship Assessment     Current Antimicrobial Therapy:  Anti-infectives (From now, onward)      Start     Dose/Rate Route Frequency Ordered Stop    24 1200  cefTRIAXone in d5w (ROCEPHIN) intermittent infusion 1 g         1 g  over 30 Minutes Intravenous EVERY 24 HOURS 24 1357              Indication: UTI    Days of Therapy: 3     Pertinent Labs:    Recent labs: (last 7 days)     24  1106 24  1156 24  0512 24  0509   WBC 4.3  --  4.9 4.8   LACT  --  1.1  --   --    PCAL  --  0.04  --   --        Temperature:  Temp (24hrs), Av.7  F (37.1  C), Min:98.4  F (36.9  C), Max:99.2  F (37.3  C)      Culture Results:   7-Day Micro Results       Collected Updated Procedure Result Status      2024 1207 2024 0846 Blood Culture Peripheral Blood [[id number]]   Peripheral Blood    Preliminary result Component Value   Culture No growth after 1 day  [P]                2024 1156 2024 0846 Blood Culture Peripheral Blood [[id number]]   Peripheral Blood    Preliminary result Component Value   Culture No growth after 1 day  [P]                2024 1059 2024 0654 Urine Culture Aerobic Bacterial [[id number]]   Urine, Straight Catheter    Final result Component Value   Culture No Growth               2024 1058 2024 1145 Asymptomatic Influenza A/B, RSV, & SARS-CoV2 PCR (COVID-19) Nasopharyngeal [[id number]]    Swab from Nasopharyngeal    Final result Component Value   Influenza A PCR Negative   Influenza B PCR Negative   RSV PCR Negative   SARS CoV2 PCR Negative   NEGATIVE: SARS-CoV-2 (COVID-19) RNA not detected, presumed negative.                  Recommendations/Interventions:  1. None at this time.    May Olivares, [facility]  2024

## 2024-03-22 NOTE — PROVIDER NOTIFICATION
Pt has been NPO for a couple days only getting NS. I noticed her glucose labs were on the lower side from yesterday morning so I did a POCT draw. BG is currently @ 50 with no PRNs. Provider notified, awaiting orders.       Provider ordered dextrose 50 % 50 mL injection and D5NS @ 50 mL/hr. BG recheck was 193.

## 2024-03-22 NOTE — PROGRESS NOTES
Writer met with Ivon from [facility] after Ivon met with patient and her son, Jamel. vIon reports that patient's family plans to bring patient home when discharged from the hospital and they would like patient to enroll with [facility]. Ivon reports that they would be able to enroll patient in their services on Monday, 03/25.

## 2024-03-22 NOTE — PHARMACY
Pharmacy Antimicrobial Stewardship Assessment     Current Antimicrobial Therapy:  Anti-infectives (From now, onward)      Start     Dose/Rate Route Frequency Ordered Stop    24 1200  cefTRIAXone in d5w (ROCEPHIN) intermittent infusion 1 g         1 g  over 30 Minutes Intravenous EVERY 24 HOURS 24 1357              Indication: UTI    Days of Therapy: 4     Pertinent Labs:    Recent labs: (last 7 days)     24  1106 24  1156 24  0512 24  0509 24  0507   WBC 4.3  --  4.9 4.8 4.1   LACT  --  1.1  --   --   --    PCAL  --  0.04  --   --   --        Temperature:  Temp (24hrs), Av.4  F (36.3  C), Min:96.9  F (36.1  C), Max:98.3  F (36.8  C)      Culture Results:   7-Day Micro Results       Collected Updated Procedure Result Status      2024 1207 2024 0713 Blood Culture Peripheral Blood [[id number]]   Peripheral Blood    Preliminary result Component Value   Culture No growth after 2 days  [P]                2024 1156 2024 0713 Blood Culture Peripheral Blood [[id number]]   Peripheral Blood    Preliminary result Component Value   Culture No growth after 2 days  [P]                2024 1059 2024 0654 Urine Culture Aerobic Bacterial [[id number]]   Urine, Straight Catheter    Final result Component Value   Culture No Growth               2024 1058 2024 1145 Asymptomatic Influenza A/B, RSV, & SARS-CoV2 PCR (COVID-19) Nasopharyngeal [[id number]]    Swab from Nasopharyngeal    Final result Component Value   Influenza A PCR Negative   Influenza B PCR Negative   RSV PCR Negative   SARS CoV2 PCR Negative   NEGATIVE: SARS-CoV-2 (COVID-19) RNA not detected, presumed negative.                  Recommendations/Interventions:  1. None at this time.    May Olivares, [facility]  2024

## 2024-03-22 NOTE — PLAN OF CARE
Goal Outcome Evaluation:    Pt was alert and looking at this writer while communicating. Neuros were unable to assess all the way as pt was not able to communicated verbally. Pt did attempt to follow finger from right to left but stopped when finger reached center of eye. Unable to tell if pt did due not being able to follow command all the way through or if pt was unable to go further. Pt was able to follow command to squeeze fingers weakly with rt hand but not with lt hand. Pt was attempting to communicate with writer when asking questions by moving her right cheek and making eye contact. Pt was able to meaningfully grab writers hand, julian bear on the bed, and move blankets off pt as pt was getting to warm being covered in bed. Pt was stopped grabbing at blankets once moved off of legs. Pt did show facial grimacing when lt arm moved and continued to move hand over right knee when asking about pain. PRN tylenol was given along with PRN ativan as pt was getting restless in bed removing blankets and legs in bed some time later in shift. Pt was able to fall asleep after admin of medication. Mepilex remain intact and in place. VSS. Assessment as charted.     Face to face report given with opportunity to observe patient.    Report given to GIANNA Robison RN   3/21/2024  7:12 PM

## 2024-03-22 NOTE — PLAN OF CARE
Goal Outcome Evaluation:      Plan of Care Reviewed With: family    Overall Patient Progress: no changeOverall Patient Progress: no change     Pt on bedrest throughout shift. Q 2 repositioning as pt tolerated due to previous conditions. Pt remains NPO on shift. IVF infusing per order. BG check was normal please see flowsheet for details. Family at pt's bedside throughout shift. PRN Tylenol suppository given per order for pain/agitation/discomfort. Please see MAR for details. Pillows in use for positioning and to protect bony surfaces. Pt had IV infiltrate to left forearm on shift. PIV 22 gauge replaced for IVF/ABX to left wrist. Pt grasping in air throughout shift with R hand. Non-verbal reported by family and observed from writer. Alarms are on for safety and video monitoring in place. Will continue to monitor.     Face to face report given with opportunity to observe patient.    Report given to GIANNA Mukherjee RN   3/22/2024  1930

## 2024-03-22 NOTE — PROGRESS NOTES
[facility]    Hospitalist Progress Note    Date of Service (when I saw the patient): 03/22/2024    Assessment & Plan   Bianca Greene is a 89 year old female who was admitted on 3/19/2024.    Left PCA/right perithalamic CVA: with resultant decreased level of consciousness and altered mental status. Confirmed by MRI on 3/20.  Patient does have fairly significant dementia at baseline, some speech with interaction with family.  Encephalopathy due to cystitis versus possible CVA.  Initial CT scan head negative.  No improvement overnight.  Did require sedation for agitation.  -3/20: Continue treating cystitis.  Will repeat head CT to see if CVA has become apparent.  If negative, consider MRI.  -3/21: Repeat head CT negative.  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  Will speak to family about goals of care  -3/22: Patient is non-responsive.  Spoke to family about possible comfort care/hospice enrollment.    Acute cystitis without hematuria: Continuing empiric Rocephin    Chronic atrial fibrillation: Not on systemic anticoagulation due to age.    FEN: Patient has not awakened for p.o. intake.  Continue IV fluids.  -Replace electrolytes as indicated  -Monitoring renal function and urine output    Clinically Significant Risk Factors                  # Hypertension: Noted on problem list     # Dementia: noted on problem list     # Moderate Malnutrition: based on nutrition assessment, PRESENT ON ADMISSION          DVT Prophylaxis: Pneumatic Compression Devices    Code Status: No CPR- Do NOT Intubate    Disposition: Prognosis is poor overall.    Chintan Mejia MD, MD        Interval History   Patient seen in room.  Patient awakens at times but does not respond to verbal stimuli, not interactive.  Some agitation at times, but currently no distress.    -Data reviewed today: I reviewed all new labs and imaging results over the last 24 hours. I personally  reviewed imaging reports.    Physical Exam   Temp: 97.4  F (36.3  C) Temp src: Tympanic BP: 149/72 Pulse: 92   Resp: 16 SpO2: 99 % O2 Device: None (Room air)    Vitals:    03/20/24 0544 03/21/24 0612 03/22/24 0410   Weight: 55.4 kg (122 lb 2.2 oz) 54.5 kg (120 lb 2.4 oz) 55.1 kg (121 lb 7.6 oz)     Vital Signs with Ranges  Temp:  [96.9  F (36.1  C)-99.2  F (37.3  C)] 97.4  F (36.3  C)  Pulse:  [] 92  Resp:  [16-28] 16  BP: (149-178)/(72-95) 149/72  SpO2:  [93 %-99 %] 99 %    No intake or output data in the 24 hours ending 03/21/24 0803    Peripheral IV 03/22/24 Anterior;Left Lower forearm (Active)   Site Assessment WDL 03/22/24 0455   Line Status Infusing 03/22/24 0455   Dressing Transparent 03/22/24 0455   Dressing Status clean;dry;intact 03/22/24 0455   Phlebitis Scale 0-->no symptoms 03/22/24 0455   Infiltration? no 03/22/24 0455   Number of days: 0     No line/device    Constitutional -patient is awake, no distress, not responsive to verbal stimuli  HEENT - atraumatic, normocephalic  Neck - supple, no masses, no JVD  CVS - S1 S2 irregular, loud systolic murmur  Respiratory - CTA b/l  GI - soft, NT, ND, + bowel sounds, no organomegaly  Musculoskeletal - no LE edema, no lesions  Neuro - unable to cooperate with neuro assessment      Medications    dextrose 5% and 0.9% NaCl 50 mL/hr at 03/22/24 0251      cefTRIAXone  1 g Intravenous Q24H    [Held by provider] metoprolol tartrate  25 mg Oral Q12H    sodium chloride (PF)  3 mL Intracatheter Q8H       Data   Recent Labs   Lab 03/22/24  0507 03/22/24  0324 03/22/24  0158 03/21/24  0509 03/20/24  0512 03/19/24  1156   WBC 4.1  --   --  4.8 4.9  --    HGB 10.5*  --   --  9.6* 10.9*  --    MCV 75*  --   --  76* 76*  --      --   --  234 236  --      --   --  137 136 136   POTASSIUM 3.5  --   --  3.8 4.2 4.2   CHLORIDE 105  --   --  109* 106 103   CO2 20*  --   --  19* 21* 24   BUN 12.9  --   --  12.1 14.2 17.4   CR 0.85  --   --  0.94 0.90 1.07*    ANIONGAP 10  --   --  9 9 9   LUKE 9.3  --   --  9.1 9.5 10.1*   * 193* 50* 71 92 101*   ALBUMIN  --   --   --   --   --  3.6   PROTTOTAL  --   --   --   --   --  7.3   BILITOTAL  --   --   --   --   --  0.4   ALKPHOS  --   --   --   --   --  129   ALT  --   --   --   --   --  11   AST  --   --   --   --   --  21     Lactic Acid   Date Value Ref Range Status   03/19/2024 1.1 0.7 - 2.0 mmol/L Final   01/31/2024 1.9 0.7 - 2.0 mmol/L Final   01/30/2024 1.8 0.7 - 2.0 mmol/L Final   10/30/2019 1.7 0.7 - 2.0 mmol/L Final       No results found for this or any previous visit (from the past 24 hour(s)).      Chintan Mejia MD

## 2024-03-23 NOTE — PROGRESS NOTES
[facility]    Hospitalist Progress Note    Date of Service (when I saw the patient): 03/23/2024    Assessment & Plan   Bianca Greene is a 89 year old female who was admitted on 3/19/2024.    Left PCA/right perithalamic CVA: with resultant decreased level of consciousness and altered mental status. Confirmed by MRI on 3/20.  Patient does have fairly significant dementia at baseline, some speech with interaction with family.  Encephalopathy due to cystitis versus possible CVA.  Initial CT scan head negative.  No improvement overnight.  Did require sedation for agitation.  -3/20: Continue treating cystitis.  Will repeat head CT to see if CVA has become apparent.  If negative, consider MRI.  -3/21: Repeat head CT negative.  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  Will speak to family about goals of care  -3/22: Patient is non-responsive.  Spoke to family about possible comfort care/hospice enrollment.  -3/23: Patient remains non-responsive.  Spoke to daughter at bedside, daughter decided on comfort care.  Hospice did speak with the family yesterday, they were all in agreement.    Acute cystitis without hematuria: Continuing empiric Rocephin  -3/23: Urine cultures negative.  Stop ceftriaxone    Chronic atrial fibrillation: Not on systemic anticoagulation due to age.    FEN: Patient has not awakened for p.o. intake.  Comfort cares instituted.    Clinically Significant Risk Factors        # Hypokalemia: Lowest K = 3.2 mmol/L in last 2 days, will replace as needed           # Hypertension: Noted on problem list     # Dementia: noted on problem list     # Moderate Malnutrition: based on nutrition assessment, PRESENT ON ADMISSION          DVT Prophylaxis: N/A, comfort cares    Code Status: No CPR- Do NOT Intubate    Disposition: Prognosis is poor overall.    Chintan Mejia MD, MD        Interval History   Patient seen in room.  Patient awakens at  times but does not respond to verbal stimuli, not interactive.  No acute events overnight.  Currently no distress.    -Data reviewed today: I reviewed all new labs and imaging results over the last 24 hours. I personally reviewed imaging reports.    Physical Exam   Temp: 98.5  F (36.9  C) Temp src: Tympanic BP: 139/88 Pulse: 83   Resp: 20 SpO2: 98 % O2 Device: None (Room air)    Vitals:    03/21/24 0612 03/22/24 0410 03/23/24 0403   Weight: 54.5 kg (120 lb 2.4 oz) 55.1 kg (121 lb 7.6 oz) 55.1 kg (121 lb 7.6 oz)     Vital Signs with Ranges  Temp:  [97.3  F (36.3  C)-98.5  F (36.9  C)] 98.5  F (36.9  C)  Pulse:  [] 83  Resp:  [16-20] 20  BP: (122-158)/(82-99) 139/88  SpO2:  [96 %-98 %] 98 %    No intake or output data in the 24 hours ending 03/21/24 0803    Peripheral IV 03/22/24 Left;Posterior Hand (Active)   Site Assessment WDL 03/23/24 0405   Line Status Infusing 03/23/24 0405   Dressing Transparent 03/23/24 0405   Dressing Status clean;dry;intact 03/23/24 0405   Dressing Intervention New dressing  03/22/24 1500   Phlebitis Scale 0-->no symptoms 03/23/24 0405   Infiltration? no 03/23/24 0405   Number of days: 1     No line/device    Constitutional -patient is awake, no distress, not responsive to verbal stimuli  HEENT - atraumatic, normocephalic  Neck - supple, no masses, no JVD  CVS - S1 S2 irregular, loud systolic murmur  Respiratory - CTA b/l  GI - soft, NT, ND, + bowel sounds, no organomegaly  Musculoskeletal - no LE edema, no lesions  Neuro - unable to cooperate with neuro assessment      Medications        sodium chloride (PF)  3 mL Intracatheter Q8H       Data   Recent Labs   Lab 03/23/24  0504 03/22/24  1740 03/22/24  0507 03/22/24  0158 03/21/24  0509 03/20/24  0512 03/19/24  1156   WBC 3.8*  --  4.1  --  4.8   < >  --    HGB 10.1*  --  10.5*  --  9.6*   < >  --    MCV 74*  --  75*  --  76*   < >  --      --  258  --  234   < >  --      --  135  --  137   < > 136   POTASSIUM 3.2*  --   3.5  --  3.8   < > 4.2   CHLORIDE 106  --  105  --  109*   < > 103   CO2 22  --  20*  --  19*   < > 24   BUN 9.0  --  12.9  --  12.1   < > 17.4   CR 0.84  --  0.85  --  0.94   < > 1.07*   ANIONGAP 8  --  10  --  9   < > 9   LUKE 9.4  --  9.3  --  9.1   < > 10.1*   * 101* 157*   < > 71   < > 101*   ALBUMIN  --   --   --   --   --   --  3.6   PROTTOTAL  --   --   --   --   --   --  7.3   BILITOTAL  --   --   --   --   --   --  0.4   ALKPHOS  --   --   --   --   --   --  129   ALT  --   --   --   --   --   --  11   AST  --   --   --   --   --   --  21    < > = values in this interval not displayed.     Lactic Acid   Date Value Ref Range Status   03/19/2024 1.1 0.7 - 2.0 mmol/L Final   01/31/2024 1.9 0.7 - 2.0 mmol/L Final   01/30/2024 1.8 0.7 - 2.0 mmol/L Final   10/30/2019 1.7 0.7 - 2.0 mmol/L Final       No results found for this or any previous visit (from the past 24 hour(s)).      Chintan Mejia MD

## 2024-03-23 NOTE — PLAN OF CARE
Goal Outcome Evaluation:      Plan of Care Reviewed With: patient        Pt has been afebrile through the night, VS as charted.  Her O2 sats are in the upper 90's on RA, lung sounds are clear.  She has scored 0 on the RFLACC scale.  She does have IV fluids running at 50 mL/hr, NPO- no void.  She has been very lethargic through the night - does open eyes with gentle touch - no tracking.  Her neuro checks have been unchanged - unable to perform much of them. She has been turned/repositioned every 2 hours - no change is skin condition.  She has remained free of falls, call light within reach - alarms on and frequent rounding done to assess needs.        Face to face report given with opportunity to observe patient.    Report given to Isabel Martell RN   3/23/2024  7:13 AM

## 2024-03-23 NOTE — PLAN OF CARE
Goal Outcome Evaluation:      Plan of Care Reviewed With: family    Overall Patient Progress: decliningOverall Patient Progress: declining    Outcome Evaluation: Patient is now on comfort cares and will transition to home hospice on Monday.    Family at pt bedside throughout shift. Video monitoring for safety when family not present. IVF dc'd in AM. X2 incontinent urine episodes. Pure wick put in place for comfort in evening. No urine output measured with pure wick. PRN's given per order. Please see MAR for details. PIV remains in place, saline locked, and flushes well. Repositioning as requested or if pt is restless. Will continue to monitor.     Face to face report given with opportunity to observe patient.    Report given to GIANNA Mukherjee RN   3/23/2024  7:20 PM

## 2024-03-24 NOTE — PLAN OF CARE
Goal Outcome Evaluation:      Plan of Care Reviewed With: family    Overall Patient Progress: decliningOverall Patient Progress: declining    Outcome Evaluation: Patient is now on comfort cares and will transition to home hospice on Monday.         Family at pt bedside throughout shift. Video monitoring for safety when family not present. X1 incontinent of urine. PRN's given per order. Please see MAR for details. PIV remains in place, saline locked, and flushes well. Repositioning as requested or if pt is restless. Will continue to monitor.      Face to face report given with opportunity to observe patient.     Report given to GIANNA Robison RN   3/24/2024  7:20 PM

## 2024-03-24 NOTE — PROGRESS NOTES
[facility]    Hospitalist Progress Note    Date of Service (when I saw the patient): 03/24/2024    Assessment & Plan   Bianca Greene is a 89 year old female who was admitted on 3/19/2024.    Left PCA/right perithalamic CVA: with resultant decreased level of consciousness and altered mental status. Confirmed by MRI on 3/20.  Patient does have fairly significant dementia at baseline, some speech with interaction with family.  Encephalopathy due to cystitis versus possible CVA.  Initial CT scan head negative.  No improvement overnight.  Did require sedation for agitation.  -3/20: Continue treating cystitis.  Will repeat head CT to see if CVA has become apparent.  If negative, consider MRI.  -3/21: Repeat head CT negative.  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  Will speak to family about goals of care  -3/22: Patient is non-responsive.  Spoke to family about possible comfort care/hospice enrollment.  -3/23: Patient remains non-responsive.  Spoke to daughter at bedside, daughter decided on comfort care.  Hospice did speak with the family yesterday, they were all in agreement.  -3/24: Patient on comfort care    Acute cystitis without hematuria: Continuing empiric Rocephin  -3/23: Urine cultures negative.  Stop ceftriaxone    Chronic atrial fibrillation: Not on systemic anticoagulation due to age.    FEN: Patient has not awakened for p.o. intake.  Comfort cares instituted.    Clinically Significant Risk Factors        # Hypokalemia: Lowest K = 3.2 mmol/L in last 2 days, will replace as needed           # Hypertension: Noted on problem list     # Dementia: noted on problem list     # Moderate Malnutrition: based on nutrition assessment           DVT Prophylaxis: N/A, comfort cares    Code Status: No CPR- Do NOT Intubate    Disposition: Prognosis is poor overall.    Chintan Mejia MD, MD        Interval History   Patient seen in room.   Unresponsive.  No acute events overnight.  Currently no distress.    -Data reviewed today: I reviewed all new labs and imaging results over the last 24 hours. I personally reviewed imaging reports.    Physical Exam             Resp: 16        Vitals:    03/21/24 0612 03/22/24 0410 03/23/24 0403   Weight: 54.5 kg (120 lb 2.4 oz) 55.1 kg (121 lb 7.6 oz) 55.1 kg (121 lb 7.6 oz)     Vital Signs with Ranges  Resp:  [16] 16    No intake or output data in the 24 hours ending 03/21/24 0803    Peripheral IV 03/22/24 Left;Posterior Hand (Active)   Site Assessment WDL 03/24/24 0200   Line Status Saline locked 03/24/24 0200   Dressing Transparent 03/24/24 0200   Dressing Status clean;dry;intact 03/24/24 0200   Dressing Intervention New dressing  03/22/24 1500   Line Intervention Flushed 03/24/24 0200   Phlebitis Scale 0-->no symptoms 03/24/24 0200   Infiltration? no 03/24/24 0200   Number of days: 2     No line/device    Constitutional - no distress, not responsive to verbal stimuli        Medications        sodium chloride (PF)  3 mL Intracatheter Q8H       Data   Recent Labs   Lab 03/23/24  0504 03/22/24  1740 03/22/24  0507 03/22/24  0158 03/21/24  0509 03/20/24  0512 03/19/24  1156   WBC 3.8*  --  4.1  --  4.8   < >  --    HGB 10.1*  --  10.5*  --  9.6*   < >  --    MCV 74*  --  75*  --  76*   < >  --      --  258  --  234   < >  --      --  135  --  137   < > 136   POTASSIUM 3.2*  --  3.5  --  3.8   < > 4.2   CHLORIDE 106  --  105  --  109*   < > 103   CO2 22  --  20*  --  19*   < > 24   BUN 9.0  --  12.9  --  12.1   < > 17.4   CR 0.84  --  0.85  --  0.94   < > 1.07*   ANIONGAP 8  --  10  --  9   < > 9   LUKE 9.4  --  9.3  --  9.1   < > 10.1*   * 101* 157*   < > 71   < > 101*   ALBUMIN  --   --   --   --   --   --  3.6   PROTTOTAL  --   --   --   --   --   --  7.3   BILITOTAL  --   --   --   --   --   --  0.4   ALKPHOS  --   --   --   --   --   --  129   ALT  --   --   --   --   --   --  11   AST  --    --   --   --   --   --  21    < > = values in this interval not displayed.     Lactic Acid   Date Value Ref Range Status   03/19/2024 1.1 0.7 - 2.0 mmol/L Final   01/31/2024 1.9 0.7 - 2.0 mmol/L Final   01/30/2024 1.8 0.7 - 2.0 mmol/L Final   10/30/2019 1.7 0.7 - 2.0 mmol/L Final       No results found for this or any previous visit (from the past 24 hour(s)).      Chintan Mejia MD

## 2024-03-24 NOTE — PLAN OF CARE
Goal Outcome Evaluation:             Pt placed on comfort cares, RR 16 and shallow, starting to have increased work of breathing this morning - medicated with roxanol.  She has scored a 0 on the RFLACC scale.  She does appear pretty lethargic - opens her eyes with repositioning - then falls back to sleep. She has been turned and repositioned every 2 hours through the night.  She is saline locked - NPO - no void - periwick system removed at apporx. 0200 - no output.  She has remained free of falls, call light within reach - video camera on patient, frequent rounding done to assess needs.          Face to face report given with opportunity to observe patient.    Report given to Isabel Martell RN   3/24/2024  7:13 AM

## 2024-03-25 NOTE — PLAN OF CARE
Goal Outcome Evaluation:upon general assessment, patient is somnolent, has not woken up even with staff attempting to reposition, family has  now requested we do not turn or reposition patient with the exception of incontinence,  patient has been medicated for her secretions and her pain, patient has been having some periods of apnea, family is continually present, patient does appear comfortable, has not cried out, mottling is not apparent at present.

## 2024-03-25 NOTE — PLAN OF CARE
Comfort cares cont. Roxanol 10 mg given w effectiveness for nonverbal indicators of pain. RR shallow, short periods of apnea. Lethargic, opens eyes to touch otherwise unresponsive. Repositioned w oral cares q2h, incont of urine x1 this shift. Video monitoring, room near nurses station and frequent rounding.      Face to face report given with opportunity to observe patient.    Report given to GIANNA Collado RN   3/25/2024  7:00 AM

## 2024-03-25 NOTE — PROGRESS NOTES
Chart reviewed and pt discussed in interdisciplinary rounds. No anticipated discharge needs at this time. CTS team will continue to monitor daily in interdisciplinary rounds and will intervene as needed/appropriate.    Writer updated [facility] on patient's current status.

## 2024-03-25 NOTE — PLAN OF CARE
Goal Outcome Evaluation:Face to face report given with opportunity to observe patient.    Report given to Aicha Rivera, GIANNA   3/25/2024  3:24 PM

## 2024-03-25 NOTE — PLAN OF CARE
Pt non responsive this shift. Pt remains on comfort cares. Breathing is labored, and apneic at times.  Pt receives Roxanol x 2 for comfort/end of life. Pt appears to be resting comfortable with cares. Pt incontinent x 1 this shift, and repositioned at this time. Foam dressing applied to coccyx for protection.     Face to face report given with opportunity to observe patient.    Report given to GIANNA Abdi.     Aicha Telles RN   3/25/2024  7:22 PM

## 2024-03-26 NOTE — PROGRESS NOTES
[facility]    Hospitalist Progress Note    Date of Service (when I saw the patient): 03/26/2024    Assessment & Plan   Bianca Greene is a 89 year old female who was admitted on 3/19/2024.    Left PCA/right perithalamic CVA: with resultant decreased level of consciousness and altered mental status. Confirmed by MRI on 3/20.  Patient does have fairly significant dementia at baseline, some speech with interaction with family.  Encephalopathy due to cystitis versus possible CVA.  Initial CT scan head negative.  No improvement overnight.  Did require sedation for agitation.  -3/20: Continue treating cystitis.  Will repeat head CT to see if CVA has become apparent.  If negative, consider MRI.  -3/21: Repeat head CT negative.  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  Will speak to family about goals of care  -3/22: Patient is non-responsive.  Spoke to family about possible comfort care/hospice enrollment.  -3/23: Patient remains non-responsive.  Spoke to daughter at bedside, daughter decided on comfort care.  Hospice did speak with the family yesterday, they were all in agreement.  -3/24: Patient on comfort care  -3/25: Continuing comfort care  -3/26: Continuing comfort care    Acute cystitis without hematuria: Continuing empiric Rocephin  -3/23: Urine cultures negative.  Stop ceftriaxone    Chronic atrial fibrillation: Not on systemic anticoagulation due to age.    FEN: Patient has not awakened for p.o. intake.  Comfort cares instituted.    Clinically Significant Risk Factors                  # Hypertension: Noted on problem list     # Dementia: noted on problem list     # Moderate Malnutrition: based on nutrition assessment           DVT Prophylaxis: N/A, comfort cares    Code Status: No CPR- Do NOT Intubate    Disposition: Comfort care.    Chintan Mejia MD, MD        Interval History   Patient seen in room.  Unresponsive.  No acute events  overnight.  Breathing somewhat labored.  Informed RN staff to increase Roxanol, ensure PO route for lorazepam.    -Data reviewed today: I reviewed all new labs and imaging results over the last 24 hours. I personally reviewed imaging reports.    Physical Exam   Temp: (!) 101  F (38.3  C) Temp src: Tympanic       Resp: 20 SpO2: (!) 87 % O2 Device: None (Room air)    Vitals:    03/21/24 0612 03/22/24 0410 03/23/24 0403   Weight: 54.5 kg (120 lb 2.4 oz) 55.1 kg (121 lb 7.6 oz) 55.1 kg (121 lb 7.6 oz)     Vital Signs with Ranges  Temp:  [101  F (38.3  C)] 101  F (38.3  C)  Resp:  [14-24] 20  SpO2:  [87 %] 87 %    No intake or output data in the 24 hours ending 03/21/24 0803         No line/device    Constitutional - no distress, not responsive to verbal stimuli        Medications        sodium chloride (PF)  3 mL Intracatheter Q8H       Data   Recent Labs   Lab 03/23/24  0504 03/22/24  1740 03/22/24  0507 03/22/24  0158 03/21/24  0509 03/20/24  0512 03/19/24  1156   WBC 3.8*  --  4.1  --  4.8   < >  --    HGB 10.1*  --  10.5*  --  9.6*   < >  --    MCV 74*  --  75*  --  76*   < >  --      --  258  --  234   < >  --      --  135  --  137   < > 136   POTASSIUM 3.2*  --  3.5  --  3.8   < > 4.2   CHLORIDE 106  --  105  --  109*   < > 103   CO2 22  --  20*  --  19*   < > 24   BUN 9.0  --  12.9  --  12.1   < > 17.4   CR 0.84  --  0.85  --  0.94   < > 1.07*   ANIONGAP 8  --  10  --  9   < > 9   LUKE 9.4  --  9.3  --  9.1   < > 10.1*   * 101* 157*   < > 71   < > 101*   ALBUMIN  --   --   --   --   --   --  3.6   PROTTOTAL  --   --   --   --   --   --  7.3   BILITOTAL  --   --   --   --   --   --  0.4   ALKPHOS  --   --   --   --   --   --  129   ALT  --   --   --   --   --   --  11   AST  --   --   --   --   --   --  21    < > = values in this interval not displayed.     Lactic Acid   Date Value Ref Range Status   03/19/2024 1.1 0.7 - 2.0 mmol/L Final   01/31/2024 1.9 0.7 - 2.0 mmol/L Final   01/30/2024 1.8  0.7 - 2.0 mmol/L Final   10/30/2019 1.7 0.7 - 2.0 mmol/L Final       No results found for this or any previous visit (from the past 24 hour(s)).      Chintan Mejia MD

## 2024-03-26 NOTE — TELEPHONE ENCOUNTER
Lopressor      Last Written Prescription Date:  02/01/24  Last Fill Quantity: 60,   # refills: 1  Last Office Visit: 02/07/24  Future Office visit:

## 2024-03-26 NOTE — DISCHARGE SUMMARY
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    Left PCA/right perithalamic CVA: with resultant decreased level of consciousness and altered mental status. Confirmed by MRI on 3/20.  Patient does have fairly significant dementia at baseline, some speech with interaction with family.  Encephalopathy due to cystitis versus possible CVA.  Initial CT scan head negative.  No improvement overnight.  Did require sedation for agitation.  -3/20: Continue treating cystitis.  Will repeat head CT to see if CVA has become apparent.  If negative, consider MRI.  -3/21: Repeat head CT negative.  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  Will speak to family about goals of care  -3/22: Patient is non-responsive.  Spoke to family about possible comfort care/hospice enrollment.  -3/23: Patient remains non-responsive.  Spoke to daughter at bedside, daughter decided on comfort care.  Hospice did speak with the family yesterday, they were all in agreement.  -3/24: Patient on comfort care  -3/25: Continuing comfort care  -3/26: Continuing comfort care    Acute cystitis without hematuria: Continuing empiric Rocephin  -3/23: Urine cultures negative.  Stopped ceftriaxone    Chronic atrial fibrillation: Not on systemic anticoagulation due to age.      Chintan Mejia MD, MD  [facility]  ______________________________________________________________________      Significant Results and Procedures   Results for orders placed or performed during the  hospital encounter of 03/19/24   CT Head w/o Contrast    Narrative    PROCEDURE: CT HEAD W/O CONTRAST     HISTORY: ams.    COMPARISON: None.    TECHNIQUE:  Helical images of the head from the foramen magnum to the  vertex were obtained without contrast.    FINDINGS: The ventricular system and cortical sulci are normal for  age. There is white matter low density in both hemispheres consistent  with small vessel changes. There are no masses or ventricular shifts  or extracerebral collections. Brainstem and cerebellum are normal. The  cranial vault is intact. The paranasal sinuses are clear.      Impression    IMPRESSION: No acute brain abnormality      ELSA MCQUEEN MD         SYSTEM ID:  [id number]   XR Chest Port 1 View    Narrative    Procedure:XR CHEST PORT 1 VIEW    Clinical history:Female, 89 years, AMS    Technique: Single view was obtained.    Comparison: 1/29/2024    Findings: The cardiac silhouette is within normal limits.. The  pulmonary vasculature is within normal limits.    The lungs are clear. Bony structures again demonstrate a number of  healed fractures involving the left ribs      Impression    Impression:   No acute abnormality. The lungs appear to be clear.    МАРИЯ ESCALANTE MD         SYSTEM ID:  [id number]   CT Head w/o Contrast    Narrative    Exam: CT HEAD W/O CONTRAST    Clinical history:89 years Female suspect CVA    Comparisons: 3/19/2024    Technique: Axial CT imaging of the head was performed Without  intervenous contrast.  This exam was performed using one or more of the following dose  reduction techniques:  Automated exposure control, adjustment of the PACO and/or KV according  to patient's size, and/or use of iterative reconstruction technique.    FINDINGS:   Ventricles and sulci are symmetric. The gray-white matter  differentiation throughout the brain is well maintained. There is  advanced periventricular white matter change of chronic small vessel  ischemic disease. There is no  evidence of intracranial mass or  hemorrhage. Visualized portions of the paranasal sinuses and mastoid  air cells are well aerated. There is no evidence of skull fracture.      Impression    IMPRESSION: No acute intracranial findings. No evidence of mass or  hemorrhage. Findings similar to the prior study.    JOSE SWANSON MD         SYSTEM ID:  [id number]   MR Brain w/o Contrast    Narrative    EXAM:  MR BRAIN W/O CONTRAST    HISTORY:  r/o CVA.    TECHNIQUE:  Multiplanar, multisequence MR imaging of the head without  intravenous contrast    COMPARISON:  CT head 3/20/2024, 3/19/2024, 1/29/2024, 1/26/2024     FINDINGS:    Evaluation severely limited secondary to motion.    On series 14/15, there is abnormal diffusion restriction in the  inferior left occipital lobe, consistent with left PCA territory.  There also appears to be abnormal diffusion restriction in the right  pericolonic white matter    There is no mass effect, midline shift, or evidence of acute  intracranial hemorrhage on limited exam. The ventricles are  proportionate to the cerebral sulci. Multifocal patchy to confluent  foci of T2 hyperintense FLAIR signal in the periventricular and  subcortical white matter, which is nonspecific, likely related to  chronic small vessel ischemic disease given the patient's age.  Moderate generalized volume loss.     Paranasal sinuses and mastoid air cells are grossly unremarkable. The  orbits are grossly unremarkable.      Impression    IMPRESSION:  Evaluation significantly limited secondary to motion.    1.  Suspected acute to subacute left PCA territory infarct.   2.  Suspected acute to subacute small right perithalamic lacunar type  infarct.    MACHELLE CERVANTES MD         SYSTEM ID:  [id number]       Consultations This Hospital Stay   PALLIATIVE CARE ADULT IP CONSULT    Primary Care Physician   Mell Burrell    Time Spent on this Encounter   IChintan MD, personally saw the patient today and spent less  than or equal to 30 minutes discharging this patient.

## 2024-03-26 NOTE — PLAN OF CARE
Goal Outcome Evaluation:         Pt is on comfort cares.  RR ranging 16-24 - shallow - with periods of apnea.  She has received Roxanol at least every 3 hours - more frequent as the night progressed.  She is saline lock - NPO - no void'; oral cares/eye drops given when medicated.  She has been turned and repositioned for comfort.  No mottling present - hot to touch.              Face to face report given with opportunity to observe patient.    Report given to Lauren KATZ and Joyce Martell RN   3/26/2024  7:41 AM

## 2024-03-26 NOTE — PLAN OF CARE
Pt  at 0900. Pt had received roxinol earlier for labored breathing. Had gotten ativan switched to oral but pt had passed prior to administration. MD, charge nurse, house supervisor notified. Pt family Selene and Jamel notified. Family would like [facility]  Home in [facility] for cremation. No autopsy per Selene.     House supervisor to get [place] at 1050 [place] [facility] [place]